# Patient Record
Sex: FEMALE | Race: BLACK OR AFRICAN AMERICAN | NOT HISPANIC OR LATINO | URBAN - METROPOLITAN AREA
[De-identification: names, ages, dates, MRNs, and addresses within clinical notes are randomized per-mention and may not be internally consistent; named-entity substitution may affect disease eponyms.]

---

## 2017-11-08 ENCOUNTER — OUTPATIENT (OUTPATIENT)
Dept: OUTPATIENT SERVICES | Facility: HOSPITAL | Age: 47
LOS: 1 days | Discharge: HOME | End: 2017-11-08

## 2017-11-08 DIAGNOSIS — Z98.89 OTHER SPECIFIED POSTPROCEDURAL STATES: Chronic | ICD-10-CM

## 2017-11-08 DIAGNOSIS — D3A.8 OTHER BENIGN NEUROENDOCRINE TUMORS: ICD-10-CM

## 2017-11-10 ENCOUNTER — APPOINTMENT (OUTPATIENT)
Dept: HEMATOLOGY ONCOLOGY | Facility: CLINIC | Age: 47
End: 2017-11-10
Payer: COMMERCIAL

## 2017-11-10 VITALS
HEART RATE: 82 BPM | TEMPERATURE: 98 F | RESPIRATION RATE: 16 BRPM | BODY MASS INDEX: 24.19 KG/M2 | WEIGHT: 132.28 LBS | SYSTOLIC BLOOD PRESSURE: 120 MMHG | DIASTOLIC BLOOD PRESSURE: 70 MMHG | OXYGEN SATURATION: 100 %

## 2017-11-10 PROCEDURE — 99214 OFFICE O/P EST MOD 30 MIN: CPT

## 2017-11-13 ENCOUNTER — INPATIENT (INPATIENT)
Facility: HOSPITAL | Age: 47
LOS: 5 days | Discharge: HOME | End: 2017-11-19
Attending: SPECIALIST | Admitting: SPECIALIST
Payer: COMMERCIAL

## 2017-11-13 ENCOUNTER — TRANSCRIPTION ENCOUNTER (OUTPATIENT)
Age: 47
End: 2017-11-13

## 2017-11-13 VITALS
HEART RATE: 85 BPM | SYSTOLIC BLOOD PRESSURE: 127 MMHG | OXYGEN SATURATION: 100 % | DIASTOLIC BLOOD PRESSURE: 79 MMHG | HEIGHT: 62 IN | WEIGHT: 130.07 LBS | RESPIRATION RATE: 18 BRPM | TEMPERATURE: 98 F

## 2017-11-13 DIAGNOSIS — Z98.89 OTHER SPECIFIED POSTPROCEDURAL STATES: Chronic | ICD-10-CM

## 2017-11-13 DIAGNOSIS — K56.690 OTHER PARTIAL INTESTINAL OBSTRUCTION: ICD-10-CM

## 2017-11-13 LAB
ALBUMIN SERPL ELPH-MCNC: 4.7 G/DL — SIGNIFICANT CHANGE UP (ref 3.3–5)
ALP SERPL-CCNC: 81 U/L — SIGNIFICANT CHANGE UP (ref 40–120)
ALT FLD-CCNC: 20 U/L — SIGNIFICANT CHANGE UP (ref 4–33)
APTT BLD: 32 SEC — SIGNIFICANT CHANGE UP (ref 27.5–37.4)
AST SERPL-CCNC: 26 U/L — SIGNIFICANT CHANGE UP (ref 4–32)
BASE EXCESS BLDV CALC-SCNC: -1.5 MMOL/L — SIGNIFICANT CHANGE UP
BASE EXCESS BLDV CALC-SCNC: 5.2 MMOL/L — SIGNIFICANT CHANGE UP
BASOPHILS # BLD AUTO: 0.03 K/UL — SIGNIFICANT CHANGE UP (ref 0–0.2)
BASOPHILS NFR BLD AUTO: 0.2 % — SIGNIFICANT CHANGE UP (ref 0–2)
BILIRUB SERPL-MCNC: 0.6 MG/DL — SIGNIFICANT CHANGE UP (ref 0.2–1.2)
BLD GP AB SCN SERPL QL: NEGATIVE — SIGNIFICANT CHANGE UP
BLD GP AB SCN SERPL QL: NEGATIVE — SIGNIFICANT CHANGE UP
BLOOD GAS VENOUS - CREATININE: 0.76 MG/DL — SIGNIFICANT CHANGE UP (ref 0.5–1.3)
BLOOD GAS VENOUS - CREATININE: SIGNIFICANT CHANGE UP MG/DL (ref 0.5–1.3)
BUN SERPL-MCNC: 12 MG/DL — SIGNIFICANT CHANGE UP (ref 7–23)
BUN SERPL-MCNC: 7 MG/DL — SIGNIFICANT CHANGE UP (ref 7–23)
CALCIUM SERPL-MCNC: 7.4 MG/DL — LOW (ref 8.4–10.5)
CALCIUM SERPL-MCNC: 9.5 MG/DL — SIGNIFICANT CHANGE UP (ref 8.4–10.5)
CHLORIDE BLDV-SCNC: 100 MMOL/L — SIGNIFICANT CHANGE UP (ref 96–108)
CHLORIDE BLDV-SCNC: 110 MMOL/L — HIGH (ref 96–108)
CHLORIDE SERPL-SCNC: 106 MMOL/L — SIGNIFICANT CHANGE UP (ref 98–107)
CHLORIDE SERPL-SCNC: 95 MMOL/L — LOW (ref 98–107)
CO2 SERPL-SCNC: 21 MMOL/L — LOW (ref 22–31)
CO2 SERPL-SCNC: 25 MMOL/L — SIGNIFICANT CHANGE UP (ref 22–31)
CREAT SERPL-MCNC: 0.65 MG/DL — SIGNIFICANT CHANGE UP (ref 0.5–1.3)
CREAT SERPL-MCNC: 0.92 MG/DL — SIGNIFICANT CHANGE UP (ref 0.5–1.3)
EOSINOPHIL # BLD AUTO: 0.02 K/UL — SIGNIFICANT CHANGE UP (ref 0–0.5)
EOSINOPHIL NFR BLD AUTO: 0.1 % — SIGNIFICANT CHANGE UP (ref 0–6)
GAS PNL BLDV: 133 MMOL/L — LOW (ref 136–146)
GAS PNL BLDV: 136 MMOL/L — SIGNIFICANT CHANGE UP (ref 136–146)
GLUCOSE BLDV-MCNC: 115 — HIGH (ref 70–99)
GLUCOSE BLDV-MCNC: 99 — SIGNIFICANT CHANGE UP (ref 70–99)
GLUCOSE SERPL-MCNC: 115 MG/DL — HIGH (ref 70–99)
GLUCOSE SERPL-MCNC: 85 MG/DL — SIGNIFICANT CHANGE UP (ref 70–99)
HCG UR-SCNC: NEGATIVE — SIGNIFICANT CHANGE UP
HCO3 BLDV-SCNC: 23 MMOL/L — SIGNIFICANT CHANGE UP (ref 20–27)
HCO3 BLDV-SCNC: 28 MMOL/L — HIGH (ref 20–27)
HCT VFR BLD CALC: 39.3 % — SIGNIFICANT CHANGE UP (ref 34.5–45)
HCT VFR BLD CALC: 42 % — SIGNIFICANT CHANGE UP (ref 34.5–45)
HCT VFR BLDV CALC: 42 % — SIGNIFICANT CHANGE UP (ref 34.5–45)
HCT VFR BLDV CALC: 45.2 % — HIGH (ref 34.5–45)
HGB BLD-MCNC: 13.3 G/DL — SIGNIFICANT CHANGE UP (ref 11.5–15.5)
HGB BLD-MCNC: 14.8 G/DL — SIGNIFICANT CHANGE UP (ref 11.5–15.5)
HGB BLDV-MCNC: 13.7 G/DL — SIGNIFICANT CHANGE UP (ref 11.5–15.5)
HGB BLDV-MCNC: 14.7 G/DL — SIGNIFICANT CHANGE UP (ref 11.5–15.5)
IMM GRANULOCYTES # BLD AUTO: 0.08 # — SIGNIFICANT CHANGE UP
IMM GRANULOCYTES NFR BLD AUTO: 0.5 % — SIGNIFICANT CHANGE UP (ref 0–1.5)
INR BLD: 1.03 — SIGNIFICANT CHANGE UP (ref 0.88–1.17)
LACTATE BLDV-MCNC: 2 MMOL/L — SIGNIFICANT CHANGE UP (ref 0.5–2)
LACTATE BLDV-MCNC: 3.6 MMOL/L — HIGH (ref 0.5–2)
LIDOCAIN IGE QN: 25.1 U/L — SIGNIFICANT CHANGE UP (ref 7–60)
LYMPHOCYTES # BLD AUTO: 1.77 K/UL — SIGNIFICANT CHANGE UP (ref 1–3.3)
LYMPHOCYTES # BLD AUTO: 10.2 % — LOW (ref 13–44)
MAGNESIUM SERPL-MCNC: 1.7 MG/DL — SIGNIFICANT CHANGE UP (ref 1.6–2.6)
MAGNESIUM SERPL-MCNC: 2.1 MG/DL — SIGNIFICANT CHANGE UP (ref 1.6–2.6)
MCHC RBC-ENTMCNC: 24.3 PG — LOW (ref 27–34)
MCHC RBC-ENTMCNC: 24.3 PG — LOW (ref 27–34)
MCHC RBC-ENTMCNC: 33.8 % — SIGNIFICANT CHANGE UP (ref 32–36)
MCHC RBC-ENTMCNC: 35.2 % — SIGNIFICANT CHANGE UP (ref 32–36)
MCV RBC AUTO: 69.1 FL — LOW (ref 80–100)
MCV RBC AUTO: 71.7 FL — LOW (ref 80–100)
MONOCYTES # BLD AUTO: 0.81 K/UL — SIGNIFICANT CHANGE UP (ref 0–0.9)
MONOCYTES NFR BLD AUTO: 4.7 % — SIGNIFICANT CHANGE UP (ref 2–14)
NEUTROPHILS # BLD AUTO: 14.63 K/UL — HIGH (ref 1.8–7.4)
NEUTROPHILS NFR BLD AUTO: 84.3 % — HIGH (ref 43–77)
NRBC # FLD: 0 — SIGNIFICANT CHANGE UP
NRBC # FLD: 0 — SIGNIFICANT CHANGE UP
PCO2 BLDV: 36 MMHG — LOW (ref 41–51)
PCO2 BLDV: 42 MMHG — SIGNIFICANT CHANGE UP (ref 41–51)
PH BLDV: 7.41 PH — SIGNIFICANT CHANGE UP (ref 7.32–7.43)
PH BLDV: 7.46 PH — HIGH (ref 7.32–7.43)
PHOSPHATE SERPL-MCNC: 3.1 MG/DL — SIGNIFICANT CHANGE UP (ref 2.5–4.5)
PLATELET # BLD AUTO: 339 K/UL — SIGNIFICANT CHANGE UP (ref 150–400)
PLATELET # BLD AUTO: 451 K/UL — HIGH (ref 150–400)
PMV BLD: 10.4 FL — SIGNIFICANT CHANGE UP (ref 7–13)
PMV BLD: 10.9 FL — SIGNIFICANT CHANGE UP (ref 7–13)
PO2 BLDV: 28 MMHG — LOW (ref 35–40)
PO2 BLDV: 30 MMHG — LOW (ref 35–40)
POTASSIUM BLDV-SCNC: 3.7 MMOL/L — SIGNIFICANT CHANGE UP (ref 3.4–4.5)
POTASSIUM BLDV-SCNC: 4 MMOL/L — SIGNIFICANT CHANGE UP (ref 3.4–4.5)
POTASSIUM SERPL-MCNC: 4.8 MMOL/L — SIGNIFICANT CHANGE UP (ref 3.5–5.3)
POTASSIUM SERPL-MCNC: 4.8 MMOL/L — SIGNIFICANT CHANGE UP (ref 3.5–5.3)
POTASSIUM SERPL-SCNC: 4.8 MMOL/L — SIGNIFICANT CHANGE UP (ref 3.5–5.3)
POTASSIUM SERPL-SCNC: 4.8 MMOL/L — SIGNIFICANT CHANGE UP (ref 3.5–5.3)
PROT SERPL-MCNC: 8.6 G/DL — HIGH (ref 6–8.3)
PROTHROM AB SERPL-ACNC: 11.5 SEC — SIGNIFICANT CHANGE UP (ref 9.8–13.1)
RBC # BLD: 5.48 M/UL — HIGH (ref 3.8–5.2)
RBC # BLD: 6.08 M/UL — HIGH (ref 3.8–5.2)
RBC # FLD: 19.3 % — HIGH (ref 10.3–14.5)
RBC # FLD: 19.8 % — HIGH (ref 10.3–14.5)
RH IG SCN BLD-IMP: POSITIVE — SIGNIFICANT CHANGE UP
RH IG SCN BLD-IMP: POSITIVE — SIGNIFICANT CHANGE UP
SAO2 % BLDV: 49.3 % — LOW (ref 60–85)
SAO2 % BLDV: 57.6 % — LOW (ref 60–85)
SODIUM SERPL-SCNC: 136 MMOL/L — SIGNIFICANT CHANGE UP (ref 135–145)
SODIUM SERPL-SCNC: 139 MMOL/L — SIGNIFICANT CHANGE UP (ref 135–145)
SP GR UR: 1.03 — SIGNIFICANT CHANGE UP (ref 1–1.03)
WBC # BLD: 16.35 K/UL — HIGH (ref 3.8–10.5)
WBC # BLD: 17.34 K/UL — HIGH (ref 3.8–10.5)
WBC # FLD AUTO: 16.35 K/UL — HIGH (ref 3.8–10.5)
WBC # FLD AUTO: 17.34 K/UL — HIGH (ref 3.8–10.5)

## 2017-11-13 PROCEDURE — 74177 CT ABD & PELVIS W/CONTRAST: CPT | Mod: 26

## 2017-11-13 PROCEDURE — 71010: CPT | Mod: 26

## 2017-11-13 RX ORDER — SODIUM CHLORIDE 9 MG/ML
1000 INJECTION INTRAMUSCULAR; INTRAVENOUS; SUBCUTANEOUS ONCE
Qty: 0 | Refills: 0 | Status: COMPLETED | OUTPATIENT
Start: 2017-11-13 | End: 2017-11-13

## 2017-11-13 RX ORDER — HYDROMORPHONE HYDROCHLORIDE 2 MG/ML
1 INJECTION INTRAMUSCULAR; INTRAVENOUS; SUBCUTANEOUS EVERY 6 HOURS
Qty: 0 | Refills: 0 | Status: DISCONTINUED | OUTPATIENT
Start: 2017-11-13 | End: 2017-11-13

## 2017-11-13 RX ORDER — INFLUENZA VIRUS VACCINE 15; 15; 15; 15 UG/.5ML; UG/.5ML; UG/.5ML; UG/.5ML
0.5 SUSPENSION INTRAMUSCULAR ONCE
Qty: 0 | Refills: 0 | Status: COMPLETED | OUTPATIENT
Start: 2017-11-13 | End: 2017-11-13

## 2017-11-13 RX ORDER — SODIUM CHLORIDE 9 MG/ML
1500 INJECTION INTRAMUSCULAR; INTRAVENOUS; SUBCUTANEOUS ONCE
Qty: 0 | Refills: 0 | Status: COMPLETED | OUTPATIENT
Start: 2017-11-13 | End: 2017-11-13

## 2017-11-13 RX ORDER — BENZOCAINE AND MENTHOL 5; 1 G/100ML; G/100ML
2 LIQUID ORAL
Qty: 0 | Refills: 0 | Status: DISCONTINUED | OUTPATIENT
Start: 2017-11-13 | End: 2017-11-19

## 2017-11-13 RX ORDER — HYDROMORPHONE HYDROCHLORIDE 2 MG/ML
2 INJECTION INTRAMUSCULAR; INTRAVENOUS; SUBCUTANEOUS EVERY 6 HOURS
Qty: 0 | Refills: 0 | Status: DISCONTINUED | OUTPATIENT
Start: 2017-11-13 | End: 2017-11-13

## 2017-11-13 RX ORDER — ACETAMINOPHEN 500 MG
1000 TABLET ORAL ONCE
Qty: 0 | Refills: 0 | Status: COMPLETED | OUTPATIENT
Start: 2017-11-13 | End: 2017-11-13

## 2017-11-13 RX ORDER — MORPHINE SULFATE 50 MG/1
4 CAPSULE, EXTENDED RELEASE ORAL ONCE
Qty: 0 | Refills: 0 | Status: DISCONTINUED | OUTPATIENT
Start: 2017-11-13 | End: 2017-11-13

## 2017-11-13 RX ORDER — ENOXAPARIN SODIUM 100 MG/ML
40 INJECTION SUBCUTANEOUS EVERY 24 HOURS
Qty: 0 | Refills: 0 | Status: DISCONTINUED | OUTPATIENT
Start: 2017-11-13 | End: 2017-11-15

## 2017-11-13 RX ORDER — SODIUM CHLORIDE 9 MG/ML
1000 INJECTION, SOLUTION INTRAVENOUS
Qty: 0 | Refills: 0 | Status: DISCONTINUED | OUTPATIENT
Start: 2017-11-13 | End: 2017-11-15

## 2017-11-13 RX ORDER — ONDANSETRON 8 MG/1
4 TABLET, FILM COATED ORAL ONCE
Qty: 0 | Refills: 0 | Status: COMPLETED | OUTPATIENT
Start: 2017-11-13 | End: 2017-11-13

## 2017-11-13 RX ORDER — MAGNESIUM SULFATE 500 MG/ML
2 VIAL (ML) INJECTION ONCE
Qty: 0 | Refills: 0 | Status: COMPLETED | OUTPATIENT
Start: 2017-11-13 | End: 2017-11-13

## 2017-11-13 RX ADMIN — SODIUM CHLORIDE 1500 MILLILITER(S): 9 INJECTION INTRAMUSCULAR; INTRAVENOUS; SUBCUTANEOUS at 05:28

## 2017-11-13 RX ADMIN — MORPHINE SULFATE 4 MILLIGRAM(S): 50 CAPSULE, EXTENDED RELEASE ORAL at 03:37

## 2017-11-13 RX ADMIN — BENZOCAINE AND MENTHOL 2 LOZENGE: 5; 1 LIQUID ORAL at 16:25

## 2017-11-13 RX ADMIN — MORPHINE SULFATE 4 MILLIGRAM(S): 50 CAPSULE, EXTENDED RELEASE ORAL at 04:05

## 2017-11-13 RX ADMIN — Medication 1000 MILLIGRAM(S): at 04:05

## 2017-11-13 RX ADMIN — HYDROMORPHONE HYDROCHLORIDE 1 MILLIGRAM(S): 2 INJECTION INTRAMUSCULAR; INTRAVENOUS; SUBCUTANEOUS at 16:39

## 2017-11-13 RX ADMIN — SODIUM CHLORIDE 100 MILLILITER(S): 9 INJECTION, SOLUTION INTRAVENOUS at 07:30

## 2017-11-13 RX ADMIN — SODIUM CHLORIDE 1000 MILLILITER(S): 9 INJECTION INTRAMUSCULAR; INTRAVENOUS; SUBCUTANEOUS at 03:37

## 2017-11-13 RX ADMIN — Medication 400 MILLIGRAM(S): at 03:37

## 2017-11-13 RX ADMIN — ONDANSETRON 4 MILLIGRAM(S): 8 TABLET, FILM COATED ORAL at 03:37

## 2017-11-13 RX ADMIN — HYDROMORPHONE HYDROCHLORIDE 1 MILLIGRAM(S): 2 INJECTION INTRAMUSCULAR; INTRAVENOUS; SUBCUTANEOUS at 11:32

## 2017-11-13 RX ADMIN — HYDROMORPHONE HYDROCHLORIDE 1 MILLIGRAM(S): 2 INJECTION INTRAMUSCULAR; INTRAVENOUS; SUBCUTANEOUS at 12:00

## 2017-11-13 RX ADMIN — ENOXAPARIN SODIUM 40 MILLIGRAM(S): 100 INJECTION SUBCUTANEOUS at 07:31

## 2017-11-13 RX ADMIN — Medication 50 GRAM(S): at 16:25

## 2017-11-13 NOTE — ED ADULT NURSE REASSESSMENT NOTE - NS ED NURSE REASSESS COMMENT FT1
Admission MD at bedside, pt unable to tolerate NGT placement. Pt currently a&ox4, respirations equal and unlabored, denies pain.

## 2017-11-13 NOTE — ED PROVIDER NOTE - OBJECTIVE STATEMENT
46 y/o F with PMH neoplasm of digestive system s/p resection , anemia p/w abdominal pain and vomiting. Pt. states she ate dinner then had abdominal pain epigastric and RUQ then vomiting, along with belching. She reports vomiting 2 x NBNB. She states she has not passed gas since the beginning of her symtpoms or had a BM. She states pain is RUQ 5/10 worse with palpation. denies fever, chills, cehst pain, SOB, fever, chills.

## 2017-11-13 NOTE — ED PROVIDER NOTE - MEDICAL DECISION MAKING DETAILS
46 y/o F with PMH neoplasm of digestive system s/p resection , anemia p/w abdominal pain and vomiting. r/o obstruction,  ct abdomen pelvis, pt states she had reaction to contrast in past but has since had ct scans with contrast without issue. Cbc, cmp, vbg, lipase, ptt/inr

## 2017-11-13 NOTE — H&P ADULT - ASSESSMENT
46 yo F with prior history of carcinoid presenting with small bowel resection    - NG tube placement  - IV fluids for resuscitation while NPO  - plan for CT scan with PO contrast to better characterize bowel obstruction  - will need further assessment of liver lesions    Discussed with Dr. Luu and Dr. Eaton, seen and examined with Dr. Pizarro (PGY-3)  --[facility], [id number]

## 2017-11-13 NOTE — ED PROVIDER NOTE - ATTENDING CONTRIBUTION TO CARE
48 yo with history of abd surgery with pain vomiting and no BM or flatus.  Abd is TTP but not peritoneal.  Will need to obtain CT to eval for SBO.  Will dispo based on the results of the CT scan

## 2017-11-13 NOTE — H&P ADULT - NSHPLABSRESULTS_GEN_ALL_CORE
CBC (11-13 @ 03:30)                              14.8                           17.34<H>  )----------------(  451<H>     84.3<H>% Neutrophils, 10.2<L>% Lymphocytes, ANC: 14.63<H>                              42.0      BMP (11-13 @ 04:00)             136     |  95<L>   |  12    		Ca++ --      Ca 9.5                ---------------------------------( 115<H>		Mg 2.1                4.8     |  25      |  0.92  			Ph --        LFTs (11-13 @ 04:00)      TPro 8.6<H> / Alb 4.7 / TBili 0.6 / DBili -- / AST 26 / ALT 20 / AlkPhos 81    Coags (11-13 @ 03:30)  aPTT 32.0 / INR 1.03 / PT 11.5    VBG (11-13 @ 03:30)     7.46<H> / 42 / 28<L> / 28<H> / 5.2 / 49.3<L>%     Lactate: 3.6<H>      ---------------------------------------------------------------------------------------------    < from: CT Abdomen and Pelvis w/ IV Cont (11.13.17 @ 04:14) >    IMPRESSION:     Small bowel obstruction with transition immediately distal to the right   hemiabdomen small bowel-small bowel anastomosis.    Recurrence of tumor in the right upper quadrant. A right lower quadrant   solid enhancing nodule is unchanged in size from prior imaging and likely   a pedunculated fibroid.    Hepatic metastases new since prior study from November 2016.    < end of copied text >

## 2017-11-13 NOTE — H&P ADULT - HISTORY OF PRESENT ILLNESS
HPI: Patient comes in with complaints of decreased PO intake, abdominal pain, and nausea/emesis since friday night.  Patient's last BM was Saturday, and has only passed small amounts of flatus since then.  Pain is intermittent, she has chills.  Denies night sweats, weight loss, SOB, chest pain, rashes.      PSH:   2014: resection of mesenteric mass (path = carcinoid).    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      Medications: Iron supplements HPI: Patient comes in with complaints of decreased PO intake, abdominal pain, and nausea/emesis since friday night.  Patient's last BM was Saturday, and has only passed small amounts of flatus since then.  Pain is intermittent, she has chills.  Denies night sweats, weight loss, SOB, chest pain, rashes.      PSH:   2014: resection of mesenteric mass (path = carcinoid).    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      Medications: Iron supplements  Allergies: contrast, shellfish    Physical exam significant for softly distended abdomen with tenderness in LUQ  Imaging demonstrated SBO with transition just distal to anastomosis, and concern for recurrence

## 2017-11-13 NOTE — H&P ADULT - NSHPPHYSICALEXAM_GEN_ALL_CORE
General: well developed, well nourished, NAD  Neuro: alert and oriented, no focal deficits, moves all extremities spontaneously  HEENT: NCAT, EOMI, anicteric, mucosa moist  Respiratory: airway patent, respirations unlabored  CVS: regular rate and rhythm  Abdomen: softly distended, with tenderness in L epigastric and LUQ, without rebound, or guarding, no appreciable masses  Extremities: no edema, sensation and movement grossly intact  Skin: warm, dry, appropriate color

## 2017-11-13 NOTE — ED ADULT TRIAGE NOTE - CHIEF COMPLAINT QUOTE
Abd pain    left sided abd pain, nausea, vomited x2 for 2 days. pain worse after eating. hx of neuroendocrine tumor where pt is c/o pain... states had surgery in 2014 but no chemo

## 2017-11-14 DIAGNOSIS — C17.9 MALIGNANT NEOPLASM OF SMALL INTESTINE, UNSPECIFIED: ICD-10-CM

## 2017-11-14 LAB
BUN SERPL-MCNC: 7 MG/DL — SIGNIFICANT CHANGE UP (ref 7–23)
CALCIUM SERPL-MCNC: 7.8 MG/DL — LOW (ref 8.4–10.5)
CHLORIDE SERPL-SCNC: 100 MMOL/L — SIGNIFICANT CHANGE UP (ref 98–107)
CO2 SERPL-SCNC: 24 MMOL/L — SIGNIFICANT CHANGE UP (ref 22–31)
CREAT SERPL-MCNC: 0.55 MG/DL — SIGNIFICANT CHANGE UP (ref 0.5–1.3)
GLUCOSE SERPL-MCNC: 134 MG/DL — HIGH (ref 70–99)
HCT VFR BLD CALC: 34.7 % — SIGNIFICANT CHANGE UP (ref 34.5–45)
HGB BLD-MCNC: 12.2 G/DL — SIGNIFICANT CHANGE UP (ref 11.5–15.5)
MAGNESIUM SERPL-MCNC: 1.8 MG/DL — SIGNIFICANT CHANGE UP (ref 1.6–2.6)
MCHC RBC-ENTMCNC: 24.7 PG — LOW (ref 27–34)
MCHC RBC-ENTMCNC: 35.2 % — SIGNIFICANT CHANGE UP (ref 32–36)
MCV RBC AUTO: 70.2 FL — LOW (ref 80–100)
NRBC # FLD: 0 — SIGNIFICANT CHANGE UP
PHOSPHATE SERPL-MCNC: 3.1 MG/DL — SIGNIFICANT CHANGE UP (ref 2.5–4.5)
PLATELET # BLD AUTO: 386 K/UL — SIGNIFICANT CHANGE UP (ref 150–400)
PMV BLD: 10.5 FL — SIGNIFICANT CHANGE UP (ref 7–13)
POTASSIUM SERPL-MCNC: 3.7 MMOL/L — SIGNIFICANT CHANGE UP (ref 3.5–5.3)
POTASSIUM SERPL-SCNC: 3.7 MMOL/L — SIGNIFICANT CHANGE UP (ref 3.5–5.3)
RBC # BLD: 4.94 M/UL — SIGNIFICANT CHANGE UP (ref 3.8–5.2)
RBC # FLD: 18.8 % — HIGH (ref 10.3–14.5)
SODIUM SERPL-SCNC: 137 MMOL/L — SIGNIFICANT CHANGE UP (ref 135–145)
WBC # BLD: 15.24 K/UL — HIGH (ref 3.8–10.5)
WBC # FLD AUTO: 15.24 K/UL — HIGH (ref 3.8–10.5)

## 2017-11-14 PROCEDURE — 99223 1ST HOSP IP/OBS HIGH 75: CPT | Mod: GC

## 2017-11-14 RX ORDER — HYDROMORPHONE HYDROCHLORIDE 2 MG/ML
1 INJECTION INTRAMUSCULAR; INTRAVENOUS; SUBCUTANEOUS
Qty: 0 | Refills: 0 | Status: DISCONTINUED | OUTPATIENT
Start: 2017-11-14 | End: 2017-11-14

## 2017-11-14 RX ORDER — NALOXONE HYDROCHLORIDE 4 MG/.1ML
0.1 SPRAY NASAL
Qty: 0 | Refills: 0 | Status: DISCONTINUED | OUTPATIENT
Start: 2017-11-14 | End: 2017-11-19

## 2017-11-14 RX ORDER — ONDANSETRON 8 MG/1
4 TABLET, FILM COATED ORAL EVERY 6 HOURS
Qty: 0 | Refills: 0 | Status: DISCONTINUED | OUTPATIENT
Start: 2017-11-14 | End: 2017-11-19

## 2017-11-14 RX ORDER — DEXAMETHASONE 0.5 MG/5ML
4 ELIXIR ORAL EVERY 6 HOURS
Qty: 0 | Refills: 0 | Status: DISCONTINUED | OUTPATIENT
Start: 2017-11-14 | End: 2017-11-14

## 2017-11-14 RX ORDER — HYDROMORPHONE HYDROCHLORIDE 2 MG/ML
0.5 INJECTION INTRAMUSCULAR; INTRAVENOUS; SUBCUTANEOUS
Qty: 0 | Refills: 0 | Status: DISCONTINUED | OUTPATIENT
Start: 2017-11-14 | End: 2017-11-14

## 2017-11-14 RX ORDER — POTASSIUM CHLORIDE 20 MEQ
10 PACKET (EA) ORAL
Qty: 0 | Refills: 0 | Status: COMPLETED | OUTPATIENT
Start: 2017-11-14 | End: 2017-11-14

## 2017-11-14 RX ORDER — HYDROMORPHONE HYDROCHLORIDE 2 MG/ML
30 INJECTION INTRAMUSCULAR; INTRAVENOUS; SUBCUTANEOUS
Qty: 0 | Refills: 0 | Status: DISCONTINUED | OUTPATIENT
Start: 2017-11-14 | End: 2017-11-16

## 2017-11-14 RX ORDER — HYDROMORPHONE HYDROCHLORIDE 2 MG/ML
0.5 INJECTION INTRAMUSCULAR; INTRAVENOUS; SUBCUTANEOUS
Qty: 0 | Refills: 0 | Status: DISCONTINUED | OUTPATIENT
Start: 2017-11-14 | End: 2017-11-16

## 2017-11-14 RX ORDER — ONDANSETRON 8 MG/1
4 TABLET, FILM COATED ORAL ONCE
Qty: 0 | Refills: 0 | Status: DISCONTINUED | OUTPATIENT
Start: 2017-11-14 | End: 2017-11-14

## 2017-11-14 RX ORDER — DIPHENHYDRAMINE HCL 50 MG
12.5 CAPSULE ORAL EVERY 4 HOURS
Qty: 0 | Refills: 0 | Status: DISCONTINUED | OUTPATIENT
Start: 2017-11-14 | End: 2017-11-16

## 2017-11-14 RX ORDER — MEPERIDINE HYDROCHLORIDE 50 MG/ML
12.5 INJECTION INTRAMUSCULAR; INTRAVENOUS; SUBCUTANEOUS
Qty: 0 | Refills: 0 | Status: DISCONTINUED | OUTPATIENT
Start: 2017-11-14 | End: 2017-11-14

## 2017-11-14 RX ADMIN — HYDROMORPHONE HYDROCHLORIDE 0.5 MILLIGRAM(S): 2 INJECTION INTRAMUSCULAR; INTRAVENOUS; SUBCUTANEOUS at 02:42

## 2017-11-14 RX ADMIN — HYDROMORPHONE HYDROCHLORIDE 0.5 MILLIGRAM(S): 2 INJECTION INTRAMUSCULAR; INTRAVENOUS; SUBCUTANEOUS at 02:50

## 2017-11-14 RX ADMIN — HYDROMORPHONE HYDROCHLORIDE 0.5 MILLIGRAM(S): 2 INJECTION INTRAMUSCULAR; INTRAVENOUS; SUBCUTANEOUS at 02:30

## 2017-11-14 RX ADMIN — HYDROMORPHONE HYDROCHLORIDE 0.5 MILLIGRAM(S): 2 INJECTION INTRAMUSCULAR; INTRAVENOUS; SUBCUTANEOUS at 02:15

## 2017-11-14 RX ADMIN — HYDROMORPHONE HYDROCHLORIDE 0.5 MILLIGRAM(S): 2 INJECTION INTRAMUSCULAR; INTRAVENOUS; SUBCUTANEOUS at 03:09

## 2017-11-14 RX ADMIN — Medication 100 MILLIEQUIVALENT(S): at 14:33

## 2017-11-14 RX ADMIN — HYDROMORPHONE HYDROCHLORIDE 0.5 MILLIGRAM(S): 2 INJECTION INTRAMUSCULAR; INTRAVENOUS; SUBCUTANEOUS at 02:41

## 2017-11-14 RX ADMIN — Medication 100 MILLIEQUIVALENT(S): at 17:19

## 2017-11-14 RX ADMIN — Medication 100 MILLIEQUIVALENT(S): at 15:46

## 2017-11-14 RX ADMIN — HYDROMORPHONE HYDROCHLORIDE 0.5 MILLIGRAM(S): 2 INJECTION INTRAMUSCULAR; INTRAVENOUS; SUBCUTANEOUS at 02:05

## 2017-11-14 RX ADMIN — SODIUM CHLORIDE 100 MILLILITER(S): 9 INJECTION, SOLUTION INTRAVENOUS at 15:47

## 2017-11-14 RX ADMIN — SODIUM CHLORIDE 100 MILLILITER(S): 9 INJECTION, SOLUTION INTRAVENOUS at 02:00

## 2017-11-14 RX ADMIN — HYDROMORPHONE HYDROCHLORIDE 30 MILLILITER(S): 2 INJECTION INTRAMUSCULAR; INTRAVENOUS; SUBCUTANEOUS at 04:15

## 2017-11-14 RX ADMIN — HYDROMORPHONE HYDROCHLORIDE 30 MILLILITER(S): 2 INJECTION INTRAMUSCULAR; INTRAVENOUS; SUBCUTANEOUS at 20:16

## 2017-11-14 RX ADMIN — ENOXAPARIN SODIUM 40 MILLIGRAM(S): 100 INJECTION SUBCUTANEOUS at 10:50

## 2017-11-14 RX ADMIN — HYDROMORPHONE HYDROCHLORIDE 0.5 MILLIGRAM(S): 2 INJECTION INTRAMUSCULAR; INTRAVENOUS; SUBCUTANEOUS at 03:00

## 2017-11-14 NOTE — CONSULT NOTE ADULT - SUBJECTIVE AND OBJECTIVE BOX
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  Patient's last BM was Saturday, and has only passed small amounts of flatus since then.  Pain is intermittent, she has chills.  Denies night sweats, weight loss, SOB, chest pain, rashes.  CT A/P showing SBO and reoccurrence of tumor as well as new liver metastases.     PSH:   2014: resection of mesenteric mass (path = carcinoid).    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      Medications: Iron supplements  Allergies: contrast, shellfish        Allergies    IV Contrast (Unknown)    Intolerances        MEDICATIONS  (STANDING):  enoxaparin Injectable 40 milliGRAM(s) SubCutaneous every 24 hours  HYDROmorphone PCA (1 mG/mL) 30 milliLiter(s) PCA Continuous PCA Continuous  influenza   Vaccine 0.5 milliLiter(s) IntraMuscular once  lactated ringers. 1000 milliLiter(s) (100 mL/Hr) IV Continuous <Continuous>  potassium chloride  10 mEq/100 mL IVPB 10 milliEquivalent(s) IV Intermittent every 1 hour    MEDICATIONS  (PRN):  benzocaine  15 mG/menthol 3.6 mG Oral Lozenge - Peds 2 Lozenge Oral every 3 hours PRN THRoat irritation  dexamethasone  Injectable 4 milliGRAM(s) IV Push every 6 hours PRN Nausea, IF ondansetron is ineffective after 30 - 60 minute  diphenhydrAMINE   Injectable 12.5 milliGRAM(s) IV Push every 4 hours PRN Pruritus  HYDROmorphone  Injectable 1 milliGRAM(s) IV Push every 10 minutes PRN Severe Pain (7 - 10)  HYDROmorphone PCA (1 mG/mL) Rescue Clinician Bolus 0.5 milliGRAM(s) IV Push every 15 minutes PRN for Pain Scale GREATER THAN 6  naloxone Injectable 0.1 milliGRAM(s) IV Push every 3 minutes PRN For ANY of the following changes in patient status:  A. RR LESS THAN 10 breaths per minute, B. Oxygen saturation LESS THAN 90%, C. Sedation score of 6  ondansetron Injectable 4 milliGRAM(s) IV Push every 6 hours PRN Nausea      PAST MEDICAL & SURGICAL HISTORY:  Anemia  Malignant neoplasm of small intestine  Neoplasm of digestive system  Vertigo  S/P laparoscopy: S/P Excsion left retroperitoneal mass 02/3/2014      FAMILY HISTORY:  No pertinent family history in first degree relatives      SOCIAL HISTORY: No EtOH, no tobacco    REVIEW OF SYSTEMS:    CONSTITUTIONAL: No weakness, fevers or chills  EYES/ENT: No visual changes;  No vertigo or throat pain   NECK: No pain or stiffness  RESPIRATORY: No cough, wheezing, hemoptysis; No shortness of breath  CARDIOVASCULAR: No chest pain or palpitations  GASTROINTESTINAL: No abdominal or epigastric pain. No nausea, vomiting, or hematemesis; No diarrhea or constipation. No melena or hematochezia.  GENITOURINARY: No dysuria, frequency or hematuria  NEUROLOGICAL: No numbness or weakness  SKIN: No itching, burning, rashes, or lesions   All other review of systems is negative unless indicated above.        T(F): 98.2 (11-14-17 @ 12:00), Max: 99 (11-13-17 @ 23:16)  HR: 85 (11-14-17 @ 12:00)  BP: 117/76 (11-14-17 @ 12:00)  RR: 16 (11-14-17 @ 12:00)  SpO2: 95% (11-14-17 @ 12:00)  Wt(kg): --    GENERAL: NAD, well-developed  HEAD:  Atraumatic, Normocephalic  EYES: EOMI, PERRLA, conjunctiva and sclera clear  NECK: Supple, No JVD  CHEST/LUNG: Clear to auscultation bilaterally; No wheeze  HEART: Regular rate and rhythm; No murmurs, rubs, or gallops  ABDOMEN: Soft, Nontender, Nondistended; Bowel sounds present  EXTREMITIES:  2+ Peripheral Pulses, No clubbing, cyanosis, or edema  NEUROLOGY: non-focal  SKIN: No rashes or lesions                          12.2   15.24 )-----------( 386      ( 14 Nov 2017 06:00 )             34.7       11-14    137  |  100  |  7   ----------------------------<  134<H>  3.7   |  24  |  0.55    Ca    7.8<L>      14 Nov 2017 06:00  Phos  3.1     11-14  Mg     1.8     11-14    TPro  8.6<H>  /  Alb  4.7  /  TBili  0.6  /  DBili  x   /  AST  26  /  ALT  20  /  AlkPhos  81  11-13      Magnesium, Serum: 1.8 mg/dL (11-14 @ 06:00)  Phosphorus Level, Serum: 3.1 mg/dL (11-14 @ 06:00)

## 2017-11-14 NOTE — CONSULT NOTE ADULT - PROBLEM SELECTOR RECOMMENDATION 9
CT A/P concerning for disease reoccurrence with metastatic disease to liver. Pt is now s/p laparotomy for small bowel resection.   - please arrange with IR for percutaneous biopsy of liver lesion to confirm with tissue diagnosis  - send serum chromogranin and urine 5-HIAA and serotonin

## 2017-11-14 NOTE — CONSULT NOTE ADULT - ASSESSMENT
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

## 2017-11-14 NOTE — CONSULT NOTE ADULT - ATTENDING COMMENTS
Low grade neuroendocrine tumor p/w recurrence causing SBO and new liver mets. S/p surgery for SBO. Paain controlled. Rec liver bx to prove mets when pt stable.

## 2017-11-14 NOTE — BRIEF OPERATIVE NOTE - PROCEDURE
<<-----Click on this checkbox to enter Procedure Exploratory laparotomy  11/14/2017  Ex-lap, NEELAM, repair of serosal tear  Active  CBEHR

## 2017-11-15 LAB
BUN SERPL-MCNC: 7 MG/DL — SIGNIFICANT CHANGE UP (ref 7–23)
CALCIUM SERPL-MCNC: 7.8 MG/DL — LOW (ref 8.4–10.5)
CHLORIDE SERPL-SCNC: 100 MMOL/L — SIGNIFICANT CHANGE UP (ref 98–107)
CO2 SERPL-SCNC: 25 MMOL/L — SIGNIFICANT CHANGE UP (ref 22–31)
CREAT SERPL-MCNC: 0.58 MG/DL — SIGNIFICANT CHANGE UP (ref 0.5–1.3)
GLUCOSE BLDC GLUCOMTR-MCNC: 101 MG/DL — HIGH (ref 70–99)
GLUCOSE BLDC GLUCOMTR-MCNC: 104 MG/DL — HIGH (ref 70–99)
GLUCOSE BLDC GLUCOMTR-MCNC: 140 MG/DL — HIGH (ref 70–99)
GLUCOSE SERPL-MCNC: 68 MG/DL — LOW (ref 70–99)
HCT VFR BLD CALC: 30.5 % — LOW (ref 34.5–45)
HGB BLD-MCNC: 10.6 G/DL — LOW (ref 11.5–15.5)
MAGNESIUM SERPL-MCNC: 1.8 MG/DL — SIGNIFICANT CHANGE UP (ref 1.6–2.6)
MCHC RBC-ENTMCNC: 24.5 PG — LOW (ref 27–34)
MCHC RBC-ENTMCNC: 34.8 % — SIGNIFICANT CHANGE UP (ref 32–36)
MCV RBC AUTO: 70.6 FL — LOW (ref 80–100)
NRBC # FLD: 0 — SIGNIFICANT CHANGE UP
PHOSPHATE SERPL-MCNC: 1.9 MG/DL — LOW (ref 2.5–4.5)
PLATELET # BLD AUTO: 340 K/UL — SIGNIFICANT CHANGE UP (ref 150–400)
PMV BLD: 10.7 FL — SIGNIFICANT CHANGE UP (ref 7–13)
POTASSIUM SERPL-MCNC: 3.4 MMOL/L — LOW (ref 3.5–5.3)
POTASSIUM SERPL-SCNC: 3.4 MMOL/L — LOW (ref 3.5–5.3)
RBC # BLD: 4.32 M/UL — SIGNIFICANT CHANGE UP (ref 3.8–5.2)
RBC # FLD: 18.2 % — HIGH (ref 10.3–14.5)
SODIUM SERPL-SCNC: 141 MMOL/L — SIGNIFICANT CHANGE UP (ref 135–145)
WBC # BLD: 11.25 K/UL — HIGH (ref 3.8–10.5)
WBC # FLD AUTO: 11.25 K/UL — HIGH (ref 3.8–10.5)

## 2017-11-15 RX ORDER — POTASSIUM PHOSPHATE, MONOBASIC POTASSIUM PHOSPHATE, DIBASIC 236; 224 MG/ML; MG/ML
15 INJECTION, SOLUTION INTRAVENOUS ONCE
Qty: 0 | Refills: 0 | Status: COMPLETED | OUTPATIENT
Start: 2017-11-15 | End: 2017-11-15

## 2017-11-15 RX ORDER — DEXTROSE MONOHYDRATE, SODIUM CHLORIDE, AND POTASSIUM CHLORIDE 50; .745; 4.5 G/1000ML; G/1000ML; G/1000ML
1000 INJECTION, SOLUTION INTRAVENOUS
Qty: 0 | Refills: 0 | Status: DISCONTINUED | OUTPATIENT
Start: 2017-11-15 | End: 2017-11-18

## 2017-11-15 RX ORDER — ACETAMINOPHEN 500 MG
1000 TABLET ORAL ONCE
Qty: 0 | Refills: 0 | Status: COMPLETED | OUTPATIENT
Start: 2017-11-15 | End: 2017-11-15

## 2017-11-15 RX ORDER — ENOXAPARIN SODIUM 100 MG/ML
40 INJECTION SUBCUTANEOUS EVERY 24 HOURS
Qty: 0 | Refills: 0 | Status: DISCONTINUED | OUTPATIENT
Start: 2017-11-17 | End: 2017-11-19

## 2017-11-15 RX ORDER — MAGNESIUM SULFATE 500 MG/ML
2 VIAL (ML) INJECTION ONCE
Qty: 0 | Refills: 0 | Status: COMPLETED | OUTPATIENT
Start: 2017-11-15 | End: 2017-11-15

## 2017-11-15 RX ORDER — POTASSIUM CHLORIDE 20 MEQ
10 PACKET (EA) ORAL
Qty: 0 | Refills: 0 | Status: COMPLETED | OUTPATIENT
Start: 2017-11-15 | End: 2017-11-15

## 2017-11-15 RX ADMIN — Medication 50 GRAM(S): at 10:52

## 2017-11-15 RX ADMIN — Medication 100 MILLIEQUIVALENT(S): at 11:43

## 2017-11-15 RX ADMIN — HYDROMORPHONE HYDROCHLORIDE 30 MILLILITER(S): 2 INJECTION INTRAMUSCULAR; INTRAVENOUS; SUBCUTANEOUS at 20:04

## 2017-11-15 RX ADMIN — HYDROMORPHONE HYDROCHLORIDE 30 MILLILITER(S): 2 INJECTION INTRAMUSCULAR; INTRAVENOUS; SUBCUTANEOUS at 15:12

## 2017-11-15 RX ADMIN — ENOXAPARIN SODIUM 40 MILLIGRAM(S): 100 INJECTION SUBCUTANEOUS at 09:57

## 2017-11-15 RX ADMIN — DEXTROSE MONOHYDRATE, SODIUM CHLORIDE, AND POTASSIUM CHLORIDE 100 MILLILITER(S): 50; .745; 4.5 INJECTION, SOLUTION INTRAVENOUS at 09:53

## 2017-11-15 RX ADMIN — Medication 100 MILLIEQUIVALENT(S): at 13:19

## 2017-11-15 RX ADMIN — HYDROMORPHONE HYDROCHLORIDE 30 MILLILITER(S): 2 INJECTION INTRAMUSCULAR; INTRAVENOUS; SUBCUTANEOUS at 08:31

## 2017-11-15 RX ADMIN — Medication 400 MILLIGRAM(S): at 18:31

## 2017-11-15 RX ADMIN — POTASSIUM PHOSPHATE, MONOBASIC POTASSIUM PHOSPHATE, DIBASIC 62.5 MILLIMOLE(S): 236; 224 INJECTION, SOLUTION INTRAVENOUS at 16:17

## 2017-11-15 RX ADMIN — Medication 100 MILLIEQUIVALENT(S): at 09:53

## 2017-11-16 ENCOUNTER — RESULT REVIEW (OUTPATIENT)
Age: 47
End: 2017-11-16

## 2017-11-16 LAB
APTT BLD: 26.4 SEC — LOW (ref 27.5–37.4)
BUN SERPL-MCNC: 4 MG/DL — LOW (ref 7–23)
BUN SERPL-MCNC: 7 MG/DL — SIGNIFICANT CHANGE UP (ref 7–23)
CALCIUM SERPL-MCNC: 7.8 MG/DL — LOW (ref 8.4–10.5)
CALCIUM SERPL-MCNC: 7.8 MG/DL — LOW (ref 8.4–10.5)
CHLORIDE SERPL-SCNC: 102 MMOL/L — SIGNIFICANT CHANGE UP (ref 98–107)
CHLORIDE SERPL-SCNC: 103 MMOL/L — SIGNIFICANT CHANGE UP (ref 98–107)
CO2 SERPL-SCNC: 23 MMOL/L — SIGNIFICANT CHANGE UP (ref 22–31)
CO2 SERPL-SCNC: 24 MMOL/L — SIGNIFICANT CHANGE UP (ref 22–31)
CREAT SERPL-MCNC: 0.53 MG/DL — SIGNIFICANT CHANGE UP (ref 0.5–1.3)
CREAT SERPL-MCNC: 0.56 MG/DL — SIGNIFICANT CHANGE UP (ref 0.5–1.3)
GLUCOSE BLDC GLUCOMTR-MCNC: 106 MG/DL — HIGH (ref 70–99)
GLUCOSE BLDC GLUCOMTR-MCNC: 118 MG/DL — HIGH (ref 70–99)
GLUCOSE SERPL-MCNC: 115 MG/DL — HIGH (ref 70–99)
GLUCOSE SERPL-MCNC: 132 MG/DL — HIGH (ref 70–99)
HCG SERPL-ACNC: < 5 MIU/ML — SIGNIFICANT CHANGE UP
HCG UR-SCNC: NEGATIVE — SIGNIFICANT CHANGE UP
HCT VFR BLD CALC: 31.4 % — LOW (ref 34.5–45)
HGB BLD-MCNC: 10.9 G/DL — LOW (ref 11.5–15.5)
INR BLD: 1.03 — SIGNIFICANT CHANGE UP (ref 0.88–1.17)
MAGNESIUM SERPL-MCNC: 1.9 MG/DL — SIGNIFICANT CHANGE UP (ref 1.6–2.6)
MAGNESIUM SERPL-MCNC: 2.1 MG/DL — SIGNIFICANT CHANGE UP (ref 1.6–2.6)
MCHC RBC-ENTMCNC: 24.3 PG — LOW (ref 27–34)
MCHC RBC-ENTMCNC: 34.7 % — SIGNIFICANT CHANGE UP (ref 32–36)
MCV RBC AUTO: 70.1 FL — LOW (ref 80–100)
NRBC # FLD: 0 — SIGNIFICANT CHANGE UP
PHOSPHATE SERPL-MCNC: 1.3 MG/DL — LOW (ref 2.5–4.5)
PHOSPHATE SERPL-MCNC: 3 MG/DL — SIGNIFICANT CHANGE UP (ref 2.5–4.5)
PLATELET # BLD AUTO: 166 K/UL — SIGNIFICANT CHANGE UP (ref 150–400)
PMV BLD: 10.6 FL — SIGNIFICANT CHANGE UP (ref 7–13)
POTASSIUM SERPL-MCNC: 3.5 MMOL/L — SIGNIFICANT CHANGE UP (ref 3.5–5.3)
POTASSIUM SERPL-MCNC: 3.6 MMOL/L — SIGNIFICANT CHANGE UP (ref 3.5–5.3)
POTASSIUM SERPL-SCNC: 3.5 MMOL/L — SIGNIFICANT CHANGE UP (ref 3.5–5.3)
POTASSIUM SERPL-SCNC: 3.6 MMOL/L — SIGNIFICANT CHANGE UP (ref 3.5–5.3)
PROTHROM AB SERPL-ACNC: 11.6 SEC — SIGNIFICANT CHANGE UP (ref 9.8–13.1)
RBC # BLD: 4.48 M/UL — SIGNIFICANT CHANGE UP (ref 3.8–5.2)
RBC # FLD: 18.3 % — HIGH (ref 10.3–14.5)
SODIUM SERPL-SCNC: 139 MMOL/L — SIGNIFICANT CHANGE UP (ref 135–145)
SODIUM SERPL-SCNC: 139 MMOL/L — SIGNIFICANT CHANGE UP (ref 135–145)
SP GR UR: 1.01 — SIGNIFICANT CHANGE UP (ref 1–1.03)
WBC # BLD: 12.81 K/UL — HIGH (ref 3.8–10.5)
WBC # FLD AUTO: 12.81 K/UL — HIGH (ref 3.8–10.5)

## 2017-11-16 PROCEDURE — 88360 TUMOR IMMUNOHISTOCHEM/MANUAL: CPT | Mod: 26

## 2017-11-16 PROCEDURE — 88342 IMHCHEM/IMCYTCHM 1ST ANTB: CPT | Mod: 26,59

## 2017-11-16 PROCEDURE — 88341 IMHCHEM/IMCYTCHM EA ADD ANTB: CPT | Mod: 26,59

## 2017-11-16 PROCEDURE — 47000 NEEDLE BIOPSY OF LIVER PERQ: CPT

## 2017-11-16 PROCEDURE — 76942 ECHO GUIDE FOR BIOPSY: CPT | Mod: 26

## 2017-11-16 PROCEDURE — 88307 TISSUE EXAM BY PATHOLOGIST: CPT | Mod: 26

## 2017-11-16 RX ORDER — POTASSIUM PHOSPHATE, MONOBASIC POTASSIUM PHOSPHATE, DIBASIC 236; 224 MG/ML; MG/ML
30 INJECTION, SOLUTION INTRAVENOUS ONCE
Qty: 0 | Refills: 0 | Status: DISCONTINUED | OUTPATIENT
Start: 2017-11-16 | End: 2017-11-16

## 2017-11-16 RX ORDER — HYDROMORPHONE HYDROCHLORIDE 2 MG/ML
0.5 INJECTION INTRAMUSCULAR; INTRAVENOUS; SUBCUTANEOUS
Qty: 0 | Refills: 0 | Status: DISCONTINUED | OUTPATIENT
Start: 2017-11-16 | End: 2017-11-18

## 2017-11-16 RX ORDER — POTASSIUM PHOSPHATE, MONOBASIC POTASSIUM PHOSPHATE, DIBASIC 236; 224 MG/ML; MG/ML
15 INJECTION, SOLUTION INTRAVENOUS ONCE
Qty: 0 | Refills: 0 | Status: COMPLETED | OUTPATIENT
Start: 2017-11-16 | End: 2017-11-16

## 2017-11-16 RX ADMIN — HYDROMORPHONE HYDROCHLORIDE 30 MILLILITER(S): 2 INJECTION INTRAMUSCULAR; INTRAVENOUS; SUBCUTANEOUS at 08:19

## 2017-11-16 RX ADMIN — POTASSIUM PHOSPHATE, MONOBASIC POTASSIUM PHOSPHATE, DIBASIC 62.5 MILLIMOLE(S): 236; 224 INJECTION, SOLUTION INTRAVENOUS at 10:35

## 2017-11-16 RX ADMIN — Medication 63.75 MILLIMOLE(S): at 14:05

## 2017-11-16 RX ADMIN — DEXTROSE MONOHYDRATE, SODIUM CHLORIDE, AND POTASSIUM CHLORIDE 100 MILLILITER(S): 50; .745; 4.5 INJECTION, SOLUTION INTRAVENOUS at 14:05

## 2017-11-16 RX ADMIN — DEXTROSE MONOHYDRATE, SODIUM CHLORIDE, AND POTASSIUM CHLORIDE 100 MILLILITER(S): 50; .745; 4.5 INJECTION, SOLUTION INTRAVENOUS at 10:35

## 2017-11-17 LAB
BUN SERPL-MCNC: 6 MG/DL — LOW (ref 7–23)
CALCIUM SERPL-MCNC: 8.2 MG/DL — LOW (ref 8.4–10.5)
CGA FLD-MCNC: 252 NG/ML — HIGH
CHLORIDE SERPL-SCNC: 103 MMOL/L — SIGNIFICANT CHANGE UP (ref 98–107)
CO2 SERPL-SCNC: 24 MMOL/L — SIGNIFICANT CHANGE UP (ref 22–31)
CREAT SERPL-MCNC: 0.63 MG/DL — SIGNIFICANT CHANGE UP (ref 0.5–1.3)
GLUCOSE SERPL-MCNC: 113 MG/DL — HIGH (ref 70–99)
HCT VFR BLD CALC: 32.9 % — LOW (ref 34.5–45)
HGB BLD-MCNC: 11.6 G/DL — SIGNIFICANT CHANGE UP (ref 11.5–15.5)
MAGNESIUM SERPL-MCNC: 1.9 MG/DL — SIGNIFICANT CHANGE UP (ref 1.6–2.6)
MCHC RBC-ENTMCNC: 25.1 PG — LOW (ref 27–34)
MCHC RBC-ENTMCNC: 35.3 % — SIGNIFICANT CHANGE UP (ref 32–36)
MCV RBC AUTO: 71.1 FL — LOW (ref 80–100)
NRBC # FLD: 0 — SIGNIFICANT CHANGE UP
PHOSPHATE SERPL-MCNC: 2.6 MG/DL — SIGNIFICANT CHANGE UP (ref 2.5–4.5)
PLATELET # BLD AUTO: 333 K/UL — SIGNIFICANT CHANGE UP (ref 150–400)
PMV BLD: 9.9 FL — SIGNIFICANT CHANGE UP (ref 7–13)
POTASSIUM SERPL-MCNC: 3.7 MMOL/L — SIGNIFICANT CHANGE UP (ref 3.5–5.3)
POTASSIUM SERPL-SCNC: 3.7 MMOL/L — SIGNIFICANT CHANGE UP (ref 3.5–5.3)
RBC # BLD: 4.63 M/UL — SIGNIFICANT CHANGE UP (ref 3.8–5.2)
RBC # FLD: 18.3 % — HIGH (ref 10.3–14.5)
SODIUM SERPL-SCNC: 138 MMOL/L — SIGNIFICANT CHANGE UP (ref 135–145)
WBC # BLD: 12.42 K/UL — HIGH (ref 3.8–10.5)
WBC # FLD AUTO: 12.42 K/UL — HIGH (ref 3.8–10.5)

## 2017-11-17 RX ORDER — BENZOCAINE AND MENTHOL 5; 1 G/100ML; G/100ML
1 LIQUID ORAL DAILY
Qty: 0 | Refills: 0 | Status: DISCONTINUED | OUTPATIENT
Start: 2017-11-17 | End: 2017-11-19

## 2017-11-17 RX ORDER — POTASSIUM PHOSPHATE, MONOBASIC POTASSIUM PHOSPHATE, DIBASIC 236; 224 MG/ML; MG/ML
15 INJECTION, SOLUTION INTRAVENOUS ONCE
Qty: 0 | Refills: 0 | Status: COMPLETED | OUTPATIENT
Start: 2017-11-17 | End: 2017-11-17

## 2017-11-17 RX ADMIN — POTASSIUM PHOSPHATE, MONOBASIC POTASSIUM PHOSPHATE, DIBASIC 62.5 MILLIMOLE(S): 236; 224 INJECTION, SOLUTION INTRAVENOUS at 16:02

## 2017-11-17 RX ADMIN — ENOXAPARIN SODIUM 40 MILLIGRAM(S): 100 INJECTION SUBCUTANEOUS at 16:01

## 2017-11-17 RX ADMIN — DEXTROSE MONOHYDRATE, SODIUM CHLORIDE, AND POTASSIUM CHLORIDE 50 MILLILITER(S): 50; .745; 4.5 INJECTION, SOLUTION INTRAVENOUS at 16:02

## 2017-11-18 ENCOUNTER — TRANSCRIPTION ENCOUNTER (OUTPATIENT)
Age: 47
End: 2017-11-18

## 2017-11-18 LAB
5OH-INDOLEACETATE UR-MCNC: 13 ZZ — HIGH
BUN SERPL-MCNC: 4 MG/DL — LOW (ref 7–23)
CALCIUM SERPL-MCNC: 8.6 MG/DL — SIGNIFICANT CHANGE UP (ref 8.4–10.5)
CHLORIDE SERPL-SCNC: 101 MMOL/L — SIGNIFICANT CHANGE UP (ref 98–107)
CO2 SERPL-SCNC: 25 MMOL/L — SIGNIFICANT CHANGE UP (ref 22–31)
CREAT SERPL-MCNC: 0.64 MG/DL — SIGNIFICANT CHANGE UP (ref 0.5–1.3)
GLUCOSE SERPL-MCNC: 90 MG/DL — SIGNIFICANT CHANGE UP (ref 70–99)
HCT VFR BLD CALC: 31.5 % — LOW (ref 34.5–45)
HGB BLD-MCNC: 11 G/DL — LOW (ref 11.5–15.5)
MAGNESIUM SERPL-MCNC: 1.7 MG/DL — SIGNIFICANT CHANGE UP (ref 1.6–2.6)
MCHC RBC-ENTMCNC: 25 PG — LOW (ref 27–34)
MCHC RBC-ENTMCNC: 34.9 % — SIGNIFICANT CHANGE UP (ref 32–36)
MCV RBC AUTO: 71.6 FL — LOW (ref 80–100)
NRBC # FLD: 0 — SIGNIFICANT CHANGE UP
PHOSPHATE SERPL-MCNC: 2.9 MG/DL — SIGNIFICANT CHANGE UP (ref 2.5–4.5)
PLATELET # BLD AUTO: 378 K/UL — SIGNIFICANT CHANGE UP (ref 150–400)
PMV BLD: 10.4 FL — SIGNIFICANT CHANGE UP (ref 7–13)
POTASSIUM SERPL-MCNC: 3.4 MMOL/L — LOW (ref 3.5–5.3)
POTASSIUM SERPL-SCNC: 3.4 MMOL/L — LOW (ref 3.5–5.3)
RBC # BLD: 4.4 M/UL — SIGNIFICANT CHANGE UP (ref 3.8–5.2)
RBC # FLD: 18.1 % — HIGH (ref 10.3–14.5)
SODIUM SERPL-SCNC: 139 MMOL/L — SIGNIFICANT CHANGE UP (ref 135–145)
WBC # BLD: 11.55 K/UL — HIGH (ref 3.8–10.5)
WBC # FLD AUTO: 11.55 K/UL — HIGH (ref 3.8–10.5)

## 2017-11-18 RX ORDER — ACETAMINOPHEN 500 MG
650 TABLET ORAL EVERY 6 HOURS
Qty: 0 | Refills: 0 | Status: DISCONTINUED | OUTPATIENT
Start: 2017-11-18 | End: 2017-11-19

## 2017-11-18 RX ORDER — POTASSIUM CHLORIDE 20 MEQ
40 PACKET (EA) ORAL ONCE
Qty: 0 | Refills: 0 | Status: COMPLETED | OUTPATIENT
Start: 2017-11-18 | End: 2017-11-18

## 2017-11-18 RX ORDER — SODIUM CHLORIDE 9 MG/ML
3 INJECTION INTRAMUSCULAR; INTRAVENOUS; SUBCUTANEOUS EVERY 8 HOURS
Qty: 0 | Refills: 0 | Status: DISCONTINUED | OUTPATIENT
Start: 2017-11-18 | End: 2017-11-19

## 2017-11-18 RX ADMIN — SODIUM CHLORIDE 3 MILLILITER(S): 9 INJECTION INTRAMUSCULAR; INTRAVENOUS; SUBCUTANEOUS at 13:30

## 2017-11-18 RX ADMIN — Medication 40 MILLIEQUIVALENT(S): at 17:39

## 2017-11-18 RX ADMIN — SODIUM CHLORIDE 3 MILLILITER(S): 9 INJECTION INTRAMUSCULAR; INTRAVENOUS; SUBCUTANEOUS at 22:09

## 2017-11-18 RX ADMIN — ENOXAPARIN SODIUM 40 MILLIGRAM(S): 100 INJECTION SUBCUTANEOUS at 17:39

## 2017-11-18 NOTE — DISCHARGE NOTE ADULT - PLAN OF CARE
Return to normal function. No activity restrictions. No dietary restrictions.     Follow up with Dr. Eaton in the office in 10-14 days. Call the office to make an appt.

## 2017-11-18 NOTE — DISCHARGE NOTE ADULT - CARE PROVIDER_API CALL
Johnson Eaton), Surgery  [address] [facility]  [address] [place] [id number]  Phone: [phone]  Fax: [phone]

## 2017-11-18 NOTE — DISCHARGE NOTE ADULT - HOSPITAL COURSE
47F admitted on 11/13/2017 with prior history of carcinoid presenting with small bowel resection now s/p laparotomy with lysis of adhesions. Patient underwent procedure on 11/14/2017. Patient was seen and evaluated by heme/onc post-operatively who recommended IR for percutaneous biopsy of liver lesion to confirm with tissue diagnosis. Patient subsequently underwent IR biopsy. Awaiting results. Patient had NGT placed post-op, was kept NPO with IVF, PCA for pain control and rincon in place for monitoring of output. NGT was removed on 11/16. Patient's diet has been advanced, currently tolerating regular diet. Incisions have remained clean, dry, intact. Stable for discharge with close outpatient follow up.

## 2017-11-18 NOTE — DISCHARGE NOTE ADULT - MEDICATION SUMMARY - MEDICATIONS TO TAKE
I will START or STAY ON the medications listed below when I get home from the hospital:  None I will START or STAY ON the medications listed below when I get home from the hospital:    Percocet 5/325 325 mg-5 mg oral tablet  -- 1 tab(s) by mouth every 6 hours, As Needed, PRN for abdominal pain MDD:4 tabs  -- Indication: For pain I will START or STAY ON the medications listed below when I get home from the hospital:    oxyCODONE 5 mg oral tablet  -- 1 tab(s) by mouth every 6 hours MDD:4 tabs  -- Caution federal law prohibits the transfer of this drug to any person other  than the person for whom it was prescribed.  It is very important that you take or use this exactly as directed.  Do not skip doses or discontinue unless directed by your doctor.  May cause drowsiness.  Alcohol may intensify this effect.  Use care when operating dangerous machinery.  This prescription cannot be refilled.  Using more of this medication than prescribed may cause serious breathing problems.    -- Indication: For Moderate to severe pain

## 2017-11-19 VITALS
TEMPERATURE: 98 F | DIASTOLIC BLOOD PRESSURE: 71 MMHG | OXYGEN SATURATION: 100 % | RESPIRATION RATE: 18 BRPM | SYSTOLIC BLOOD PRESSURE: 115 MMHG | HEART RATE: 67 BPM

## 2017-11-19 RX ORDER — OXYCODONE HYDROCHLORIDE 5 MG/1
1 TABLET ORAL
Qty: 20 | Refills: 0 | OUTPATIENT
Start: 2017-11-19 | End: 2017-11-24

## 2017-11-19 RX ADMIN — SODIUM CHLORIDE 3 MILLILITER(S): 9 INJECTION INTRAMUSCULAR; INTRAVENOUS; SUBCUTANEOUS at 05:45

## 2017-11-19 RX ADMIN — SODIUM CHLORIDE 3 MILLILITER(S): 9 INJECTION INTRAMUSCULAR; INTRAVENOUS; SUBCUTANEOUS at 14:46

## 2017-11-19 NOTE — PROGRESS NOTE ADULT - ASSESSMENT
48 yo F with prior history of carcinoid presenting with small bowel resection now s/p laparotomy with lysis of adhesions.  - NPO/NGT to LCS  - Pain control  - Medical oncology following, recommending IR biopsy of known liver lesion due to history of neuroendocrine tumor  - Monitor NGT outputs  - DVT prophylaxis
46 yo F with prior history of carcinoid presenting with small bowel resection now s/p laparotomy with lysis of adhesions.    Plan:  - NPO with NGT to LCS  - PCA for pain control  - Medical oncology following, recommending IR biopsy of known liver lesion due to history of neuroendocrine tumor  - Monitor outputs
48 yo F with prior history of carcinoid presenting with small bowel resection now s/p laparotomy with lysis of adhesions.    Plan:  - Regular diet   - Pain control  - DVT prophylaxis   - Discharge home today if tolerating diet
48 yo F with prior history of carcinoid presenting with small bowel resection now s/p laparotomy with lysis of adhesions.  - NPO  - Will D/C NGT  - Pain control  - To IR for liver biopsy due to history of neuroendocrine tumor  - DVT prophylaxis
48 yo F with prior history of carcinoid presenting with small bowel resection now s/p laparotomy with lysis of adhesions.  - Regular diet   - Pain control  - DVT prophylaxis   - Discharge home today if afebrile and richi diet
48 yo F with prior history of carcinoid presenting with small bowel resection now s/p laparotomy with lysis of adhesions.  - on sips  - Pain control  - D/C rincon DTV  - DVT prophylaxis

## 2017-11-19 NOTE — PROGRESS NOTE ADULT - SUBJECTIVE AND OBJECTIVE BOX
B Team Surgery Progress Note - pager [phone]    SUBJECTIVE: Patient seen and examined on B team morning rounds. No acute events overnight. Pain controlled.       OBJECTIVE:     ** Physical Exam **  Gen: Alert, NAD  Resp: non labored breathing  Abd: softly distended, moderately tender, dressing CDI    ** Vital signs / I&O's **    Vital Signs Last 24 Hrs  T(C): 36.7 (15 Nov 2017 08:00), Max: 37.9 (14 Nov 2017 18:07)  T(F): 98 (15 Nov 2017 08:00), Max: 100.2 (14 Nov 2017 18:07)  HR: 98 (15 Nov 2017 08:00) (80 - 113)  BP: 131/71 (15 Nov 2017 08:00) (117/76 - 132/85)  BP(mean): --  RR: 18 (15 Nov 2017 08:00) (16 - 18)  SpO2: 96% (15 Nov 2017 08:00) (94% - 99%)      14 Nov 2017 07:01  -  15 Nov 2017 07:00  --------------------------------------------------------  IN:    lactated ringers.: 1600 mL  Total IN: 1600 mL    OUT:    Indwelling Catheter - Urethral: 1355 mL    Nasoenteral Tube: 675 mL  Total OUT: 2030 mL    Total NET: -430 mL      15 Nov 2017 07:01  -  15 Nov 2017 09:05  --------------------------------------------------------  IN:    lactated ringers.: 400 mL  Total IN: 400 mL    OUT:    Indwelling Catheter - Urethral: 220 mL    Nasoenteral Tube: 175 mL  Total OUT: 395 mL    Total NET: 5 mL            ** Labs **                          10.6   11.25 )-----------( 340      ( 15 Nov 2017 06:00 )             30.5     15 Nov 2017 06:00    141    |  100    |  7      ----------------------------<  68     3.4     |  25     |  0.58     Ca    7.8        15 Nov 2017 06:00  Phos  1.9       15 Nov 2017 06:00  Mg     1.8       15 Nov 2017 06:00        CAPILLARY BLOOD GLUCOSE                  MEDICATIONS  (STANDING):  enoxaparin Injectable 40 milliGRAM(s) SubCutaneous every 24 hours  HYDROmorphone PCA (1 mG/mL) 30 milliLiter(s) PCA Continuous PCA Continuous  influenza   Vaccine 0.5 milliLiter(s) IntraMuscular once  lactated ringers. 1000 milliLiter(s) (100 mL/Hr) IV Continuous <Continuous>    MEDICATIONS  (PRN):  benzocaine  15 mG/menthol 3.6 mG Oral Lozenge - Peds 2 Lozenge Oral every 3 hours PRN THRoat irritation  diphenhydrAMINE   Injectable 12.5 milliGRAM(s) IV Push every 4 hours PRN Pruritus  HYDROmorphone PCA (1 mG/mL) Rescue Clinician Bolus 0.5 milliGRAM(s) IV Push every 15 minutes PRN for Pain Scale GREATER THAN 6  naloxone Injectable 0.1 milliGRAM(s) IV Push every 3 minutes PRN For ANY of the following changes in patient status:  A. RR LESS THAN 10 breaths per minute, B. Oxygen saturation LESS THAN 90%, C. Sedation score of 6  ondansetron Injectable 4 milliGRAM(s) IV Push every 6 hours PRN Nausea
ANESTHESIA POSTOP CHECK    47y Female POSTOP DAY 1     No COMPLAINTS    NO APPARENT ANESTHESIA COMPLICATIONS
Anesthesia Pain Management Service    SUBJECTIVE: Pt doing well with IV PCA without problems reported.    Therapy:	  [ X] IV PCA	   [ ] Epidural           [ ] s/p Spinal Opoid              [ ] Postpartum infusion	  [ ] Patient controlled regional anesthesia (PCRA)    [ ] prn Analgesics    Allergies    IV Contrast (Unknown)    Intolerances      MEDICATIONS  (STANDING):  enoxaparin Injectable 40 milliGRAM(s) SubCutaneous every 24 hours  HYDROmorphone PCA (1 mG/mL) 30 milliLiter(s) PCA Continuous PCA Continuous  influenza   Vaccine 0.5 milliLiter(s) IntraMuscular once  lactated ringers. 1000 milliLiter(s) (100 mL/Hr) IV Continuous <Continuous>  potassium chloride  10 mEq/100 mL IVPB 10 milliEquivalent(s) IV Intermittent every 1 hour    MEDICATIONS  (PRN):  benzocaine  15 mG/menthol 3.6 mG Oral Lozenge - Peds 2 Lozenge Oral every 3 hours PRN THRoat irritation  dexamethasone  Injectable 4 milliGRAM(s) IV Push every 6 hours PRN Nausea, IF ondansetron is ineffective after 30 - 60 minute  diphenhydrAMINE   Injectable 12.5 milliGRAM(s) IV Push every 4 hours PRN Pruritus  HYDROmorphone  Injectable 1 milliGRAM(s) IV Push every 10 minutes PRN Severe Pain (7 - 10)  HYDROmorphone PCA (1 mG/mL) Rescue Clinician Bolus 0.5 milliGRAM(s) IV Push every 15 minutes PRN for Pain Scale GREATER THAN 6  naloxone Injectable 0.1 milliGRAM(s) IV Push every 3 minutes PRN For ANY of the following changes in patient status:  A. RR LESS THAN 10 breaths per minute, B. Oxygen saturation LESS THAN 90%, C. Sedation score of 6  ondansetron Injectable 4 milliGRAM(s) IV Push every 6 hours PRN Nausea      OBJECTIVE:   [X] No new signs     [ ] Other:    Side Effects:  [X ] None			[ ] Other:    Assessment of Catheter Site:		[ ] Intact		[ ] Other:    ASSESSMENT/PLAN  [ X] Continue current therapy    [ ] Therapy changed to:    [ ] IV PCA       [ ] Epidural     [ ] prn Analgesics     Comments:
Anesthesia Pain Management Service    SUBJECTIVE: Pt now off IV PCA without problems reported.    Therapy:	  [ X] IV PCA	   [ ] Epidural           [ ] s/p Spinal Opoid              [ ] Postpartum infusion	  [ ] Patient controlled regional anesthesia (PCRA)    [ ] prn Analgesics    Allergies    IV Contrast (Unknown)    Intolerances      MEDICATIONS  (STANDING):  dextrose 5% + sodium chloride 0.45% with potassium chloride 20 mEq/L 1000 milliLiter(s) (100 mL/Hr) IV Continuous <Continuous>  influenza   Vaccine 0.5 milliLiter(s) IntraMuscular once    MEDICATIONS  (PRN):  benzocaine  15 mG/menthol 3.6 mG Oral Lozenge - Peds 2 Lozenge Oral every 3 hours PRN THRoat irritation  HYDROmorphone  Injectable 0.5 milliGRAM(s) IV Push every 3 hours PRN Moderate and Severe Pain  naloxone Injectable 0.1 milliGRAM(s) IV Push every 3 minutes PRN For ANY of the following changes in patient status:  A. RR LESS THAN 10 breaths per minute, B. Oxygen saturation LESS THAN 90%, C. Sedation score of 6  ondansetron Injectable 4 milliGRAM(s) IV Push every 6 hours PRN Nausea      OBJECTIVE:   [X] No new signs     [ ] Other:    Side Effects:  [X ] None			[ ] Other:    Assessment of Catheter Site:		[ ] Intact		[ ] Other:    ASSESSMENT/PLAN  [ ] Continue current therapy    [X ] Therapy changed to:    [ ] IV PCA       [ ] Epidural     [ X] prn Analgesics     Comments: Opioids or Adjuvent analgesics to be used at this point.    Progress Note written now but Patient was seen earlier.
B Team Surgery Progress Note - pager [phone]    Patient is a 47y old  Female who presents with a chief complaint of Abdominal pain, nausea (13 Nov 2017 05:07)    SUBJECTIVE: Patient seen and examined on B team morning rounds. No acute events overnight.       OBJECTIVE:     ** Physical Exam **  Gen: Alert, NAD  Resp: non labored breathing  Abd: softly distended, moderately tender, dressing CDI    ** Vital signs / I&O's **    Vital Signs Last 24 Hrs  T(C): 37.3 (16 Nov 2017 05:25), Max: 38.2 (15 Nov 2017 17:58)  T(F): 99.1 (16 Nov 2017 05:25), Max: 100.7 (15 Nov 2017 17:58)  HR: 89 (16 Nov 2017 05:25) (89 - 99)  BP: 130/76 (16 Nov 2017 05:25) (120/77 - 132/81)  BP(mean): --  RR: 18 (16 Nov 2017 05:25) (17 - 18)  SpO2: 99% (16 Nov 2017 05:25) (99% - 100%)      15 Nov 2017 07:01  -  16 Nov 2017 07:00  --------------------------------------------------------  IN:    dextrose 5% + sodium chloride 0.45% with potassium chloride 20 mEq/L: 1600 mL    IV PiggyBack: 600 mL    lactated ringers.: 400 mL  Total IN: 2600 mL    OUT:    Indwelling Catheter - Urethral: 1820 mL    Nasoenteral Tube: 175 mL  Total OUT: 1995 mL    Total NET: 605 mL      16 Nov 2017 07:01  -  16 Nov 2017 12:03  --------------------------------------------------------  IN:  Total IN: 0 mL    OUT:    Indwelling Catheter - Urethral: 700 mL  Total OUT: 700 mL    Total NET: -700 mL            ** Labs **                          10.9   12.81 )-----------( 166      ( 16 Nov 2017 06:00 )             31.4     16 Nov 2017 06:00    139    |  102    |  4      ----------------------------<  115    3.6     |  24     |  0.53     Ca    7.8        16 Nov 2017 06:00  Phos  1.3       16 Nov 2017 06:00  Mg     2.1       16 Nov 2017 06:00      PT/INR - ( 16 Nov 2017 06:00 )   PT: 11.6 SEC;   INR: 1.03          PTT - ( 16 Nov 2017 06:00 )  PTT:26.4 SEC  CAPILLARY BLOOD GLUCOSE      POCT Blood Glucose.: 118 mg/dL (16 Nov 2017 05:59)  POCT Blood Glucose.: 140 mg/dL (15 Nov 2017 23:49)  POCT Blood Glucose.: 104 mg/dL (15 Nov 2017 17:15)  POCT Blood Glucose.: 101 mg/dL (15 Nov 2017 12:15)              MEDICATIONS  (STANDING):  dextrose 5% + sodium chloride 0.45% with potassium chloride 20 mEq/L 1000 milliLiter(s) (100 mL/Hr) IV Continuous <Continuous>  influenza   Vaccine 0.5 milliLiter(s) IntraMuscular once  sodium phosphate IVPB 15 milliMole(s) IV Intermittent once    MEDICATIONS  (PRN):  benzocaine  15 mG/menthol 3.6 mG Oral Lozenge - Peds 2 Lozenge Oral every 3 hours PRN THRoat irritation  HYDROmorphone  Injectable 0.5 milliGRAM(s) IV Push every 3 hours PRN Moderate and Severe Pain  naloxone Injectable 0.1 milliGRAM(s) IV Push every 3 minutes PRN For ANY of the following changes in patient status:  A. RR LESS THAN 10 breaths per minute, B. Oxygen saturation LESS THAN 90%, C. Sedation score of 6  ondansetron Injectable 4 milliGRAM(s) IV Push every 6 hours PRN Nausea
B Team Surgery Progress Note - pager [phone]    Patient is a 47y old  Female who presents with a chief complaint of Abdominal pain, nausea (18 Nov 2017 11:13)      SUBJECTIVE: Patient seen and examined on B team morning rounds. No acute events overnight.       OBJECTIVE:     ** Physical Exam **  Gen: Alert, NAD  Resp: Non labored breathing  Abd: soft, NT/ND. No rebound/guarding  Ext: FROM x 4    ** Vital signs / I&O's **    Vital Signs Last 24 Hrs  T(C): 37.3 (19 Nov 2017 10:07), Max: 37.9 (19 Nov 2017 09:34)  T(F): 99.2 (19 Nov 2017 10:07), Max: 100.3 (19 Nov 2017 09:34)  HR: 76 (19 Nov 2017 09:34) (76 - 92)  BP: 116/71 (19 Nov 2017 09:34) (101/74 - 129/81)  BP(mean): --  RR: 18 (19 Nov 2017 09:34) (16 - 18)  SpO2: 95% (19 Nov 2017 09:34) (95% - 100%)      18 Nov 2017 07:01  -  19 Nov 2017 07:00  --------------------------------------------------------  IN:    dextrose 5% + sodium chloride 0.45% with potassium chloride 20 mEq/L: 100 mL  Total IN: 100 mL    OUT:    Voided: 1200 mL  Total OUT: 1200 mL    Total NET: -1100 mL            ** Labs **                          11.0   11.55 )-----------( 378      ( 18 Nov 2017 06:15 )             31.5     18 Nov 2017 06:15    139    |  101    |  4      ----------------------------<  90     3.4     |  25     |  0.64     Ca    8.6        18 Nov 2017 06:15  Phos  2.9       18 Nov 2017 06:15  Mg     1.7       18 Nov 2017 06:15        CAPILLARY BLOOD GLUCOSE                  MEDICATIONS  (STANDING):  enoxaparin Injectable 40 milliGRAM(s) SubCutaneous every 24 hours  influenza   Vaccine 0.5 milliLiter(s) IntraMuscular once  sodium chloride 0.9% lock flush 3 milliLiter(s) IV Push every 8 hours    MEDICATIONS  (PRN):  acetaminophen    Suspension. 650 milliGRAM(s) Oral every 6 hours PRN Mild Pain (1 - 3)/moderate pain  benzocaine  15 mG/menthol 3.6 mG Oral Lozenge - Peds 2 Lozenge Oral every 3 hours PRN THRoat irritation  benzocaine 15 mG/menthol 3.6 mG Lozenge 1 Lozenge Oral daily PRN Sore Throat  naloxone Injectable 0.1 milliGRAM(s) IV Push every 3 minutes PRN For ANY of the following changes in patient status:  A. RR LESS THAN 10 breaths per minute, B. Oxygen saturation LESS THAN 90%, C. Sedation score of 6  ondansetron Injectable 4 milliGRAM(s) IV Push every 6 hours PRN Nausea
B Team Surgery Progress Note - pager [phone]    Patient is a 47y old  Female who presents with a chief complaint of Abdominal pain, nausea (13 Nov 2017 05:07)      SUBJECTIVE: Patient seen and examined on B team morning rounds. Patient stable in PACU. Pain controlled with PCA.       OBJECTIVE:     ** Physical Exam **  Gen: Alert, NAD  Abd: softly distended, moderately tender, dressing c/d/i    ** Vital signs / I&O's **    Vital Signs Last 24 Hrs  T(C): 36.8 (14 Nov 2017 12:00), Max: 37.2 (13 Nov 2017 23:16)  T(F): 98.2 (14 Nov 2017 12:00), Max: 99 (13 Nov 2017 23:16)  HR: 85 (14 Nov 2017 12:00) (75 - 94)  BP: 117/76 (14 Nov 2017 12:00) (110/57 - 142/74)  BP(mean): 90 (14 Nov 2017 01:50) (90 - 90)  RR: 16 (14 Nov 2017 12:00) (13 - 23)  SpO2: 95% (14 Nov 2017 12:00) (95% - 100%)      13 Nov 2017 07:01  -  14 Nov 2017 07:00  --------------------------------------------------------  IN:    lactated ringers.: 600 mL  Total IN: 600 mL    OUT:    Indwelling Catheter - Urethral: 495 mL  Total OUT: 495 mL    Total NET: 105 mL      14 Nov 2017 07:01  -  14 Nov 2017 12:41  --------------------------------------------------------  IN:    lactated ringers.: 400 mL  Total IN: 400 mL    OUT:    Indwelling Catheter - Urethral: 315 mL    Nasoenteral Tube: 100 mL  Total OUT: 415 mL    Total NET: -15 mL            ** Labs **                          12.2   15.24 )-----------( 386      ( 14 Nov 2017 06:00 )             34.7     14 Nov 2017 06:00    137    |  100    |  7      ----------------------------<  134    3.7     |  24     |  0.55     Ca    7.8        14 Nov 2017 06:00  Phos  3.1       14 Nov 2017 06:00  Mg     1.8       14 Nov 2017 06:00    TPro  8.6    /  Alb  4.7    /  TBili  0.6    /  DBili  x      /  AST  26     /  ALT  20     /  AlkPhos  81     13 Nov 2017 04:00    PT/INR - ( 13 Nov 2017 03:30 )   PT: 11.5 SEC;   INR: 1.03          PTT - ( 13 Nov 2017 03:30 )  PTT:32.0 SEC  CAPILLARY BLOOD GLUCOSE            LIVER FUNCTIONS - ( 13 Nov 2017 04:00 )  Alb: 4.7 g/dL / Pro: 8.6 g/dL / ALK PHOS: 81 u/L / ALT: 20 u/L / AST: 26 u/L / GGT: x               MEDICATIONS  (STANDING):  enoxaparin Injectable 40 milliGRAM(s) SubCutaneous every 24 hours  HYDROmorphone PCA (1 mG/mL) 30 milliLiter(s) PCA Continuous PCA Continuous  influenza   Vaccine 0.5 milliLiter(s) IntraMuscular once  lactated ringers. 1000 milliLiter(s) (100 mL/Hr) IV Continuous <Continuous>  potassium chloride  10 mEq/100 mL IVPB 10 milliEquivalent(s) IV Intermittent every 1 hour    MEDICATIONS  (PRN):  benzocaine  15 mG/menthol 3.6 mG Oral Lozenge - Peds 2 Lozenge Oral every 3 hours PRN THRoat irritation  dexamethasone  Injectable 4 milliGRAM(s) IV Push every 6 hours PRN Nausea, IF ondansetron is ineffective after 30 - 60 minute  diphenhydrAMINE   Injectable 12.5 milliGRAM(s) IV Push every 4 hours PRN Pruritus  HYDROmorphone  Injectable 1 milliGRAM(s) IV Push every 10 minutes PRN Severe Pain (7 - 10)  HYDROmorphone PCA (1 mG/mL) Rescue Clinician Bolus 0.5 milliGRAM(s) IV Push every 15 minutes PRN for Pain Scale GREATER THAN 6  naloxone Injectable 0.1 milliGRAM(s) IV Push every 3 minutes PRN For ANY of the following changes in patient status:  A. RR LESS THAN 10 breaths per minute, B. Oxygen saturation LESS THAN 90%, C. Sedation score of 6  ondansetron Injectable 4 milliGRAM(s) IV Push every 6 hours PRN Nausea
B Team Surgery Progress Note - pager [phone]    Patient is a 47y old  Female who presents with a chief complaint of Abdominal pain, nausea (13 Nov 2017 05:07)      SUBJECTIVE: Patient seen and examined on B team morning rounds. No acute events overnight. Patient has been tolerating clears, amenable to having regular diet. Has been passing gas and had a BM. Pain is well controlled.       OBJECTIVE:     ** Physical Exam **  Gen: Alert, NAD  Resp: non labored breathing  Abd: softly distended, appropriately tender, dressing CDI      ** Vital signs / I&O's **    Vital Signs Last 24 Hrs  T(C): 36.7 (18 Nov 2017 05:09), Max: 37.2 (17 Nov 2017 17:55)  T(F): 98 (18 Nov 2017 05:09), Max: 99 (17 Nov 2017 17:55)  HR: 79 (18 Nov 2017 05:09) (78 - 84)  BP: 130/72 (18 Nov 2017 05:09) (111/66 - 130/72)  BP(mean): --  RR: 18 (18 Nov 2017 05:09) (18 - 19)  SpO2: 98% (18 Nov 2017 05:09) (98% - 100%)      17 Nov 2017 07:01  -  18 Nov 2017 07:00  --------------------------------------------------------  IN:    dextrose 5% + sodium chloride 0.45% with potassium chloride 20 mEq/L: 800 mL  Total IN: 800 mL    OUT:    Indwelling Catheter - Urethral: 325 mL    Voided: 1075 mL  Total OUT: 1400 mL    Total NET: -600 mL            ** Labs **                          11.0   11.55 )-----------( 378      ( 18 Nov 2017 06:15 )             31.5     18 Nov 2017 06:15    139    |  101    |  4      ----------------------------<  90     3.4     |  25     |  0.64     Ca    8.6        18 Nov 2017 06:15  Phos  2.9       18 Nov 2017 06:15  Mg     1.7       18 Nov 2017 06:15        CAPILLARY BLOOD GLUCOSE                  MEDICATIONS  (STANDING):  dextrose 5% + sodium chloride 0.45% with potassium chloride 20 mEq/L 1000 milliLiter(s) (50 mL/Hr) IV Continuous <Continuous>  enoxaparin Injectable 40 milliGRAM(s) SubCutaneous every 24 hours  influenza   Vaccine 0.5 milliLiter(s) IntraMuscular once    MEDICATIONS  (PRN):  benzocaine  15 mG/menthol 3.6 mG Oral Lozenge - Peds 2 Lozenge Oral every 3 hours PRN THRoat irritation  benzocaine 15 mG/menthol 3.6 mG Lozenge 1 Lozenge Oral daily PRN Sore Throat  HYDROmorphone  Injectable 0.5 milliGRAM(s) IV Push every 3 hours PRN Moderate and Severe Pain  naloxone Injectable 0.1 milliGRAM(s) IV Push every 3 minutes PRN For ANY of the following changes in patient status:  A. RR LESS THAN 10 breaths per minute, B. Oxygen saturation LESS THAN 90%, C. Sedation score of 6  ondansetron Injectable 4 milliGRAM(s) IV Push every 6 hours PRN Nausea
B Team Surgery Progress Note - pager [phone]    SUBJECTIVE: Patient seen and examined on B team morning rounds. No acute events overnight.       OBJECTIVE:     ** Physical Exam **  Gen: Alert, NAD  Resp: non labored breathing  Abd: softly distended, appropriately tender, dressing CDI    ** Vital signs / I&O's **    Vital Signs Last 24 Hrs  T(C): 37.6 (17 Nov 2017 02:00), Max: 37.9 (16 Nov 2017 14:00)  T(F): 99.6 (17 Nov 2017 02:00), Max: 100.3 (16 Nov 2017 14:00)  HR: 80 (17 Nov 2017 02:00) (80 - 92)  BP: 112/67 (17 Nov 2017 02:00) (112/67 - 129/70)  BP(mean): --  RR: 19 (17 Nov 2017 02:00) (18 - 19)  SpO2: 98% (17 Nov 2017 02:00) (97% - 100%)      16 Nov 2017 07:01  -  17 Nov 2017 07:00  --------------------------------------------------------  IN:    dextrose 5% + sodium chloride 0.45% with potassium chloride 20 mEq/L: 2200 mL    IV PiggyBack: 500 mL  Total IN: 2700 mL    OUT:    Indwelling Catheter - Urethral: 2450 mL  Total OUT: 2450 mL    Total NET: 250 mL            ** Labs **                          11.6   12.42 )-----------( 333      ( 17 Nov 2017 06:30 )             32.9     17 Nov 2017 06:30    138    |  103    |  6      ----------------------------<  113    3.7     |  24     |  0.63     Ca    8.2        17 Nov 2017 06:30  Phos  2.6       17 Nov 2017 06:30  Mg     1.9       17 Nov 2017 06:30      PT/INR - ( 16 Nov 2017 06:00 )   PT: 11.6 SEC;   INR: 1.03          PTT - ( 16 Nov 2017 06:00 )  PTT:26.4 SEC  CAPILLARY BLOOD GLUCOSE      POCT Blood Glucose.: 106 mg/dL (16 Nov 2017 13:04)              MEDICATIONS  (STANDING):  dextrose 5% + sodium chloride 0.45% with potassium chloride 20 mEq/L 1000 milliLiter(s) (100 mL/Hr) IV Continuous <Continuous>  enoxaparin Injectable 40 milliGRAM(s) SubCutaneous every 24 hours  influenza   Vaccine 0.5 milliLiter(s) IntraMuscular once    MEDICATIONS  (PRN):  benzocaine  15 mG/menthol 3.6 mG Oral Lozenge - Peds 2 Lozenge Oral every 3 hours PRN THRoat irritation  HYDROmorphone  Injectable 0.5 milliGRAM(s) IV Push every 3 hours PRN Moderate and Severe Pain  naloxone Injectable 0.1 milliGRAM(s) IV Push every 3 minutes PRN For ANY of the following changes in patient status:  A. RR LESS THAN 10 breaths per minute, B. Oxygen saturation LESS THAN 90%, C. Sedation score of 6  ondansetron Injectable 4 milliGRAM(s) IV Push every 6 hours PRN Nausea
Day _3_ of Anesthesia Pain Management Service    Allergies  IV Contrast (Unknown)    SUBJECTIVE: "I'm not having a lot of pain."    Pain Scale Score	At rest: _3/10_ 	With Activity: ___ 	[ ] Refer to charted pain scores    THERAPY:    [ ] IV PCA Morphine		[ ] 5 mg/mL	[ ] 1 mg/mL  [X] IV PCA Hydromorphone	[ ] 5 mg/mL	[X] 1 mg/mL  [ ] IV PCA Fentanyl		[ ] 50 micrograms/mL    Demand dose _0.2mg_ lockout _6_ (minutes) Continuous Rate _0_ Total: _1.6mg_  Daily      MEDICATIONS  (STANDING):  dextrose 5% + sodium chloride 0.45% with potassium chloride 20 mEq/L 1000 milliLiter(s) (100 mL/Hr) IV Continuous <Continuous>  enoxaparin Injectable 40 milliGRAM(s) SubCutaneous every 24 hours  HYDROmorphone PCA (1 mG/mL) 30 milliLiter(s) PCA Continuous PCA Continuous  influenza   Vaccine 0.5 milliLiter(s) IntraMuscular once  potassium chloride  10 mEq/100 mL IVPB 10 milliEquivalent(s) IV Intermittent every 1 hour  potassium phosphate IVPB 15 milliMole(s) IV Intermittent once    MEDICATIONS  (PRN):  benzocaine  15 mG/menthol 3.6 mG Oral Lozenge - Peds 2 Lozenge Oral every 3 hours PRN THRoat irritation  diphenhydrAMINE   Injectable 12.5 milliGRAM(s) IV Push every 4 hours PRN Pruritus  HYDROmorphone PCA (1 mG/mL) Rescue Clinician Bolus 0.5 milliGRAM(s) IV Push every 15 minutes PRN for Pain Scale GREATER THAN 6  naloxone Injectable 0.1 milliGRAM(s) IV Push every 3 minutes PRN For ANY of the following changes in patient status:  A. RR LESS THAN 10 breaths per minute, B. Oxygen saturation LESS THAN 90%, C. Sedation score of 6  ondansetron Injectable 4 milliGRAM(s) IV Push every 6 hours PRN Nausea      OBJECTIVE: A&Ox3, NAD, supine in bed    Sedation Score:	[X] Alert	[ ] Drowsy	[ ] Arousable	[ ] Asleep	[ ] Unresponsive    Side Effects:	[X] None	[ ] Nausea	[ ] Vomiting	[ ] Pruritus  		  [ ] Weakness		[ ] Numbness	[ ] Other:                              10.6   11.25 )-----------( 340      ( 15 Nov 2017 06:00 )             30.5       11-15    141  |  100  |  7   ----------------------------<  68<L>  3.4<L>   |  25  |  0.58    Ca    7.8<L>      15 Nov 2017 06:00  Phos  1.9     11-15  Mg     1.8     11-15        ASSESSMENT/ PLAN    Therapy to  be:	[X] Continue   [ ] Discontinued   [ ] Change to prn Analgesics    Documentation and Verification of current medications:  [X] Done	[ ] Not done, not eligible  [ ] Not done, reason not given    Comments:  Remains NPO with NG Tube  Continued per patient's request
Day _3__ of Anesthesia Pain Management Service    SUBJECTIVE:    Therapy:	  [x ] IV PCA	   [ ] Epidural           [ ] s/p Spinal Opoid              [ ] Postpartum infusion	  [ ] Patient controlled regional anesthesia (PCRA)    [ ] prn Analgesics    OBJECTIVE:   [x ] No new signs     [ ] Other:    Side Effects:  [ x] None			[ ] Other:    Assessment of Catheter Site:		[ ] Intact		[ ] Other:    ASSESSMENT/PLAN  [ x] Continue current therapy    [ ] Therapy changed to:    [ ] IV PCA       [ ] Epidural     [ ] prn Analgesics     Comments:
Day _4_ of Anesthesia Pain Management Service    Allergies  IV Contrast (Unknown)    SUBJECTIVE:     Pain Scale Score	At rest: ___ 	With Activity: ___ 	[x] Refer to charted pain scores    THERAPY:    [ ] IV PCA Morphine		[ ] 5 mg/mL	[ ] 1 mg/mL  [X] IV PCA Hydromorphone	[ ] 5 mg/mL	[X] 1 mg/mL  [ ] IV PCA Fentanyl		[ ] 50 micrograms/mL    Demand dose _0.2mg_ lockout _6_ (minutes) Continuous Rate _0_ Total: _0mg Daily      MEDICATIONS  (STANDING):  dextrose 5% + sodium chloride 0.45% with potassium chloride 20 mEq/L 1000 milliLiter(s) (100 mL/Hr) IV Continuous <Continuous>  influenza   Vaccine 0.5 milliLiter(s) IntraMuscular once  sodium phosphate IVPB 15 milliMole(s) IV Intermittent once    MEDICATIONS  (PRN):  benzocaine  15 mG/menthol 3.6 mG Oral Lozenge - Peds 2 Lozenge Oral every 3 hours PRN THRoat irritation  HYDROmorphone  Injectable 0.5 milliGRAM(s) IV Push every 3 hours PRN Moderate and Severe Pain  naloxone Injectable 0.1 milliGRAM(s) IV Push every 3 minutes PRN For ANY of the following changes in patient status:  A. RR LESS THAN 10 breaths per minute, B. Oxygen saturation LESS THAN 90%, C. Sedation score of 6  ondansetron Injectable 4 milliGRAM(s) IV Push every 6 hours PRN Nausea      OBJECTIVE: A&Ox3, NAD, supine on stretcher, just returned from IR.    Sedation Score:	[X] Alert	[ ] Drowsy	[ ] Arousable	[ ] Asleep	[ ] Unresponsive    Side Effects:	[X] None	[ ] Nausea	[ ] Vomiting	[ ] Pruritus  		  [ ] Weakness		[ ] Numbness	[ ] Other:    PT/INR - ( 16 Nov 2017 06:00 )   PT: 11.6 SEC;   INR: 1.03     PTT - ( 16 Nov 2017 06:00 )  PTT:26.4 SEC                          10.9   12.81 )-----------( 166      ( 16 Nov 2017 06:00 )             31.4       11-16    139  |  102  |  4<L>  ----------------------------<  115<H>  3.6   |  24  |  0.53    Ca    7.8<L>      16 Nov 2017 06:00  Phos  1.3     11-16  Mg     2.1     11-16        ASSESSMENT/ PLAN    Therapy to  be:	[] Continue   [X Discontinued   [x Change to prn Analgesics    Documentation and Verification of current medications:  [X] Done	[ ] Not done, not eligible  [ ] Not done, reason not given    Comments:  Changed to PRN analgesics as patient did not require use of IV PCA overnight.
Day __1_ of Anesthesia Pain Management Service    Allergies    IV Contrast (Unknown)    Intolerances        SUBJECTIVE: "I'm doing ok, the pump is helping out"    Pain Scale Score	At rest: _2_ 	With Activity: ___ 	[ ] Refer to charted pain scores    THERAPY:    [ ] IV PCA Morphine		[ ] 5 mg/mL	[ ] 1 mg/mL  [X] IV PCA Hydromorphone	[ ] 5 mg/mL	[X] 1 mg/mL  [ ] IV PCA Fentanyl		[ ] 50 micrograms/mL    Demand dose _0.2mg_ lockout _6_ (minutes) Continuous Rate _0_ Total: _2.5mg__  Daily      MEDICATIONS  (STANDING):  enoxaparin Injectable 40 milliGRAM(s) SubCutaneous every 24 hours  HYDROmorphone PCA (1 mG/mL) 30 milliLiter(s) PCA Continuous PCA Continuous  influenza   Vaccine 0.5 milliLiter(s) IntraMuscular once  lactated ringers. 1000 milliLiter(s) (100 mL/Hr) IV Continuous <Continuous>    MEDICATIONS  (PRN):  benzocaine  15 mG/menthol 3.6 mG Oral Lozenge - Peds 2 Lozenge Oral every 3 hours PRN THRoat irritation  dexamethasone  Injectable 4 milliGRAM(s) IV Push every 6 hours PRN Nausea, IF ondansetron is ineffective after 30 - 60 minute  diphenhydrAMINE   Injectable 12.5 milliGRAM(s) IV Push every 4 hours PRN Pruritus  HYDROmorphone  Injectable 1 milliGRAM(s) IV Push every 10 minutes PRN Severe Pain (7 - 10)  HYDROmorphone PCA (1 mG/mL) Rescue Clinician Bolus 0.5 milliGRAM(s) IV Push every 15 minutes PRN for Pain Scale GREATER THAN 6  meperidine     Injectable 12.5 milliGRAM(s) IV Push every 10 minutes PRN Shivering  naloxone Injectable 0.1 milliGRAM(s) IV Push every 3 minutes PRN For ANY of the following changes in patient status:  A. RR LESS THAN 10 breaths per minute, B. Oxygen saturation LESS THAN 90%, C. Sedation score of 6  ondansetron Injectable 4 milliGRAM(s) IV Push every 6 hours PRN Nausea  ondansetron Injectable 4 milliGRAM(s) IV Push once PRN Nausea and/or Vomiting      OBJECTIVE: A&Ox3, NAD, supine in bed    Sedation Score:	[X] Alert	[ ] Drowsy	[ ] Arousable	[ ] Asleep	[ ] Unresponsive    Side Effects:	[X] None	[ ] Nausea	[ ] Vomiting	[ ] Pruritus  		  [ ] Weakness		[ ] Numbness	[ ] Other:    PT/INR - ( 13 Nov 2017 03:30 )   PT: 11.5 SEC;   INR: 1.03          PTT - ( 13 Nov 2017 03:30 )  PTT:32.0 SEC                          12.2   15.24 )-----------( 386      ( 14 Nov 2017 06:00 )             34.7       11-14    137  |  100  |  7   ----------------------------<  134<H>  3.7   |  24  |  0.55    Ca    7.8<L>      14 Nov 2017 06:00  Phos  3.1     11-14  Mg     1.8     11-14    TPro  8.6<H>  /  Alb  4.7  /  TBili  0.6  /  DBili  x   /  AST  26  /  ALT  20  /  AlkPhos  81  11-13      ASSESSMENT/ PLAN    Therapy to  be:	[X] Continue   [ ] Discontinued   [ ] Change to prn Analgesics    Documentation and Verification of current medications:  [X] Done	[ ] Not done, not eligible  [ ] Not done, reason not given    [ ]  MITCHELL ZIMMERMAN Reviewed and Copied to Chart    Comments: pt. in PACU continue current therapy.
Pre-Interventional Radiology Procedure Note    47y    Female    Procedure: Liver lesion biopsy    Diagnosis/Indication: Patient is a 47y old  Female who presents with a chief complaint of Abdominal pain, nausea (13 Nov 2017 05:07)      Interventional Radiology Attending Physician:     Ordering Attending Physician: Dr. aEton    PAST MEDICAL & SURGICAL HISTORY:  Anemia  Malignant neoplasm of small intestine  Neoplasm of digestive system  Vertigo  S/P laparoscopy: S/P Excsion left retroperitoneal mass 02/3/2014    s/p Ex-lap, NEELAM, repair of serosal tear POD #2       CBC Full  -  ( 16 Nov 2017 06:00 )  WBC Count : 12.81 K/uL  Hemoglobin : 10.9 g/dL  Hematocrit : 31.4 %  Platelet Count - Automated : 166 K/uL  Mean Cell Volume : 70.1 fL  Mean Cell Hemoglobin : 24.3 pg  Mean Cell Hemoglobin Concentration : 34.7 %      11-16    139  |  102  |  4<L>  ----------------------------<  115<H>  3.6   |  24  |  0.53    Ca    7.8<L>      16 Nov 2017 06:00  Phos  1.3     11-16  Mg     2.1     11-16      PT/INR - ( 16 Nov 2017 06:00 )   PT: 11.6 SEC;   INR: 1.03          PTT - ( 16 Nov 2017 06:00 )  PTT:26.4 SEC

## 2017-11-22 ENCOUNTER — APPOINTMENT (OUTPATIENT)
Dept: SURGERY | Facility: CLINIC | Age: 47
End: 2017-11-22
Payer: COMMERCIAL

## 2017-11-22 VITALS
BODY MASS INDEX: 24.29 KG/M2 | WEIGHT: 132 LBS | HEIGHT: 62 IN | TEMPERATURE: 98.3 F | DIASTOLIC BLOOD PRESSURE: 70 MMHG | SYSTOLIC BLOOD PRESSURE: 107 MMHG | HEART RATE: 80 BPM

## 2017-11-22 LAB — NON-GYNECOLOGICAL CYTOLOGY STUDY: SIGNIFICANT CHANGE UP

## 2017-11-22 PROCEDURE — 99024 POSTOP FOLLOW-UP VISIT: CPT

## 2017-11-29 ENCOUNTER — APPOINTMENT (OUTPATIENT)
Dept: HEMATOLOGY ONCOLOGY | Facility: CLINIC | Age: 47
End: 2017-11-29

## 2017-11-29 ENCOUNTER — APPOINTMENT (OUTPATIENT)
Dept: SURGERY | Facility: CLINIC | Age: 47
End: 2017-11-29
Payer: COMMERCIAL

## 2017-11-29 VITALS
WEIGHT: 132 LBS | BODY MASS INDEX: 24.29 KG/M2 | TEMPERATURE: 98.3 F | DIASTOLIC BLOOD PRESSURE: 67 MMHG | SYSTOLIC BLOOD PRESSURE: 95 MMHG | HEIGHT: 62 IN | HEART RATE: 82 BPM

## 2017-11-29 PROCEDURE — 99024 POSTOP FOLLOW-UP VISIT: CPT

## 2017-12-06 ENCOUNTER — APPOINTMENT (OUTPATIENT)
Dept: HEMATOLOGY ONCOLOGY | Facility: CLINIC | Age: 47
End: 2017-12-06
Payer: COMMERCIAL

## 2017-12-06 VITALS
TEMPERATURE: 98.6 F | SYSTOLIC BLOOD PRESSURE: 104 MMHG | BODY MASS INDEX: 22.58 KG/M2 | RESPIRATION RATE: 16 BRPM | DIASTOLIC BLOOD PRESSURE: 73 MMHG | HEART RATE: 74 BPM | OXYGEN SATURATION: 100 % | WEIGHT: 123.46 LBS

## 2017-12-06 PROCEDURE — 99214 OFFICE O/P EST MOD 30 MIN: CPT

## 2017-12-12 ENCOUNTER — OUTPATIENT (OUTPATIENT)
Dept: OUTPATIENT SERVICES | Facility: HOSPITAL | Age: 47
LOS: 1 days | Discharge: HOME | End: 2017-12-12

## 2017-12-12 DIAGNOSIS — Z98.89 OTHER SPECIFIED POSTPROCEDURAL STATES: Chronic | ICD-10-CM

## 2017-12-12 DIAGNOSIS — D3A.8 OTHER BENIGN NEUROENDOCRINE TUMORS: ICD-10-CM

## 2017-12-14 ENCOUNTER — APPOINTMENT (OUTPATIENT)
Dept: INFUSION THERAPY | Facility: HOSPITAL | Age: 47
End: 2017-12-14

## 2017-12-15 ENCOUNTER — CHART COPY (OUTPATIENT)
Age: 47
End: 2017-12-15

## 2017-12-22 ENCOUNTER — APPOINTMENT (OUTPATIENT)
Dept: INFUSION THERAPY | Facility: HOSPITAL | Age: 47
End: 2017-12-22

## 2018-01-11 ENCOUNTER — OUTPATIENT (OUTPATIENT)
Dept: OUTPATIENT SERVICES | Facility: HOSPITAL | Age: 48
LOS: 1 days | Discharge: HOME | End: 2018-01-11

## 2018-01-11 DIAGNOSIS — D3A.8 OTHER BENIGN NEUROENDOCRINE TUMORS: ICD-10-CM

## 2018-01-11 DIAGNOSIS — Z98.89 OTHER SPECIFIED POSTPROCEDURAL STATES: Chronic | ICD-10-CM

## 2018-01-12 ENCOUNTER — APPOINTMENT (OUTPATIENT)
Dept: HEMATOLOGY ONCOLOGY | Facility: CLINIC | Age: 48
End: 2018-01-12
Payer: COMMERCIAL

## 2018-01-12 VITALS
HEART RATE: 71 BPM | RESPIRATION RATE: 16 BRPM | TEMPERATURE: 98 F | SYSTOLIC BLOOD PRESSURE: 110 MMHG | BODY MASS INDEX: 22.58 KG/M2 | OXYGEN SATURATION: 100 % | WEIGHT: 123.46 LBS | DIASTOLIC BLOOD PRESSURE: 70 MMHG

## 2018-01-12 PROCEDURE — 99214 OFFICE O/P EST MOD 30 MIN: CPT

## 2018-01-22 ENCOUNTER — APPOINTMENT (OUTPATIENT)
Dept: INFUSION THERAPY | Facility: HOSPITAL | Age: 48
End: 2018-01-22

## 2018-01-22 ENCOUNTER — OTHER (OUTPATIENT)
Age: 48
End: 2018-01-22

## 2018-02-14 ENCOUNTER — OUTPATIENT (OUTPATIENT)
Dept: OUTPATIENT SERVICES | Facility: HOSPITAL | Age: 48
LOS: 1 days | Discharge: HOME | End: 2018-02-14

## 2018-02-14 DIAGNOSIS — D3A.8 OTHER BENIGN NEUROENDOCRINE TUMORS: ICD-10-CM

## 2018-02-14 DIAGNOSIS — Z98.89 OTHER SPECIFIED POSTPROCEDURAL STATES: Chronic | ICD-10-CM

## 2018-02-20 ENCOUNTER — APPOINTMENT (OUTPATIENT)
Dept: INFUSION THERAPY | Facility: HOSPITAL | Age: 48
End: 2018-02-20

## 2018-03-12 ENCOUNTER — RESULT REVIEW (OUTPATIENT)
Age: 48
End: 2018-03-12

## 2018-03-12 ENCOUNTER — APPOINTMENT (OUTPATIENT)
Dept: HEMATOLOGY ONCOLOGY | Facility: CLINIC | Age: 48
End: 2018-03-12
Payer: COMMERCIAL

## 2018-03-12 VITALS
TEMPERATURE: 97.9 F | SYSTOLIC BLOOD PRESSURE: 123 MMHG | DIASTOLIC BLOOD PRESSURE: 81 MMHG | BODY MASS INDEX: 22.18 KG/M2 | HEART RATE: 65 BPM | RESPIRATION RATE: 16 BRPM | OXYGEN SATURATION: 100 % | WEIGHT: 121.25 LBS

## 2018-03-12 LAB
ALBUMIN SERPL ELPH-MCNC: 4.4 G/DL
ALP BLD-CCNC: 63 U/L
ALT SERPL-CCNC: 9 U/L
ANION GAP SERPL CALC-SCNC: 12 MMOL/L
AST SERPL-CCNC: 14 U/L
BASOPHILS # BLD AUTO: 0 K/UL — SIGNIFICANT CHANGE UP (ref 0–0.2)
BASOPHILS NFR BLD AUTO: 0.6 % — SIGNIFICANT CHANGE UP (ref 0–2)
BILIRUB SERPL-MCNC: 0.3 MG/DL
BUN SERPL-MCNC: 9 MG/DL
CALCIUM SERPL-MCNC: 9.8 MG/DL
CHLORIDE SERPL-SCNC: 99 MMOL/L
CO2 SERPL-SCNC: 28 MMOL/L
CREAT SERPL-MCNC: 0.97 MG/DL
EOSINOPHIL # BLD AUTO: 0.2 K/UL — SIGNIFICANT CHANGE UP (ref 0–0.5)
EOSINOPHIL NFR BLD AUTO: 2.4 % — SIGNIFICANT CHANGE UP (ref 0–6)
GLUCOSE SERPL-MCNC: 93 MG/DL
HCT VFR BLD CALC: 35.9 % — SIGNIFICANT CHANGE UP (ref 34.5–45)
HGB BLD-MCNC: 12.6 G/DL — SIGNIFICANT CHANGE UP (ref 11.5–15.5)
LDH SERPL-CCNC: 214 U/L
LYMPHOCYTES # BLD AUTO: 2.1 K/UL — SIGNIFICANT CHANGE UP (ref 1–3.3)
LYMPHOCYTES # BLD AUTO: 24.8 % — SIGNIFICANT CHANGE UP (ref 13–44)
MCHC RBC-ENTMCNC: 25.5 PG — LOW (ref 27–34)
MCHC RBC-ENTMCNC: 35 G/DL — SIGNIFICANT CHANGE UP (ref 32–36)
MCV RBC AUTO: 72.9 FL — LOW (ref 80–100)
MONOCYTES # BLD AUTO: 0.5 K/UL — SIGNIFICANT CHANGE UP (ref 0–0.9)
MONOCYTES NFR BLD AUTO: 5.8 % — SIGNIFICANT CHANGE UP (ref 2–14)
NEUTROPHILS # BLD AUTO: 5.6 K/UL — SIGNIFICANT CHANGE UP (ref 1.8–7.4)
NEUTROPHILS NFR BLD AUTO: 66.4 % — SIGNIFICANT CHANGE UP (ref 43–77)
PLATELET # BLD AUTO: 402 K/UL — HIGH (ref 150–400)
POTASSIUM SERPL-SCNC: 4.8 MMOL/L
PROT SERPL-MCNC: 7.5 G/DL
RBC # BLD: 4.92 M/UL — SIGNIFICANT CHANGE UP (ref 3.8–5.2)
RBC # FLD: 16.9 % — HIGH (ref 10.3–14.5)
SODIUM SERPL-SCNC: 139 MMOL/L
WBC # BLD: 8.5 K/UL — SIGNIFICANT CHANGE UP (ref 3.8–10.5)
WBC # FLD AUTO: 8.5 K/UL — SIGNIFICANT CHANGE UP (ref 3.8–10.5)

## 2018-03-12 PROCEDURE — 99214 OFFICE O/P EST MOD 30 MIN: CPT

## 2018-03-14 LAB — CGA SERPL-MCNC: 213 NG/ML

## 2018-04-04 LAB — SEROTONIN SERUM: NORMAL

## 2018-04-08 ENCOUNTER — FORM ENCOUNTER (OUTPATIENT)
Age: 48
End: 2018-04-08

## 2018-04-09 ENCOUNTER — APPOINTMENT (OUTPATIENT)
Dept: CT IMAGING | Facility: IMAGING CENTER | Age: 48
End: 2018-04-09
Payer: COMMERCIAL

## 2018-04-09 ENCOUNTER — OUTPATIENT (OUTPATIENT)
Dept: OUTPATIENT SERVICES | Facility: HOSPITAL | Age: 48
LOS: 1 days | End: 2018-04-09
Payer: COMMERCIAL

## 2018-04-09 DIAGNOSIS — Z98.89 OTHER SPECIFIED POSTPROCEDURAL STATES: Chronic | ICD-10-CM

## 2018-04-09 DIAGNOSIS — D3A.8 OTHER BENIGN NEUROENDOCRINE TUMORS: ICD-10-CM

## 2018-04-09 PROCEDURE — 74177 CT ABD & PELVIS W/CONTRAST: CPT | Mod: 26

## 2018-04-09 PROCEDURE — 71260 CT THORAX DX C+: CPT | Mod: 26

## 2018-04-09 PROCEDURE — 71260 CT THORAX DX C+: CPT

## 2018-04-09 PROCEDURE — 74177 CT ABD & PELVIS W/CONTRAST: CPT

## 2018-05-24 ENCOUNTER — OUTPATIENT (OUTPATIENT)
Dept: OUTPATIENT SERVICES | Facility: HOSPITAL | Age: 48
LOS: 1 days | Discharge: HOME | End: 2018-05-24

## 2018-05-24 DIAGNOSIS — Z98.89 OTHER SPECIFIED POSTPROCEDURAL STATES: Chronic | ICD-10-CM

## 2018-05-24 DIAGNOSIS — D3A.8 OTHER BENIGN NEUROENDOCRINE TUMORS: ICD-10-CM

## 2018-06-06 ENCOUNTER — APPOINTMENT (OUTPATIENT)
Dept: HEMATOLOGY ONCOLOGY | Facility: CLINIC | Age: 48
End: 2018-06-06
Payer: COMMERCIAL

## 2018-06-06 VITALS
WEIGHT: 126.75 LBS | BODY MASS INDEX: 23.18 KG/M2 | HEART RATE: 71 BPM | SYSTOLIC BLOOD PRESSURE: 123 MMHG | DIASTOLIC BLOOD PRESSURE: 83 MMHG | OXYGEN SATURATION: 100 % | TEMPERATURE: 97.7 F | RESPIRATION RATE: 16 BRPM

## 2018-06-06 PROCEDURE — 99215 OFFICE O/P EST HI 40 MIN: CPT

## 2018-06-11 ENCOUNTER — APPOINTMENT (OUTPATIENT)
Dept: INFUSION THERAPY | Facility: HOSPITAL | Age: 48
End: 2018-06-11

## 2018-07-02 ENCOUNTER — OUTPATIENT (OUTPATIENT)
Dept: OUTPATIENT SERVICES | Facility: HOSPITAL | Age: 48
LOS: 1 days | Discharge: HOME | End: 2018-07-02

## 2018-07-02 DIAGNOSIS — Z98.89 OTHER SPECIFIED POSTPROCEDURAL STATES: Chronic | ICD-10-CM

## 2018-07-02 DIAGNOSIS — D3A.8 OTHER BENIGN NEUROENDOCRINE TUMORS: ICD-10-CM

## 2018-07-03 ENCOUNTER — APPOINTMENT (OUTPATIENT)
Dept: INFUSION THERAPY | Facility: HOSPITAL | Age: 48
End: 2018-07-03

## 2018-08-02 ENCOUNTER — OUTPATIENT (OUTPATIENT)
Dept: OUTPATIENT SERVICES | Facility: HOSPITAL | Age: 48
LOS: 1 days | Discharge: HOME | End: 2018-08-02

## 2018-08-02 DIAGNOSIS — D3A.8 OTHER BENIGN NEUROENDOCRINE TUMORS: ICD-10-CM

## 2018-08-02 DIAGNOSIS — Z98.89 OTHER SPECIFIED POSTPROCEDURAL STATES: Chronic | ICD-10-CM

## 2018-08-06 ENCOUNTER — APPOINTMENT (OUTPATIENT)
Dept: INFUSION THERAPY | Facility: HOSPITAL | Age: 48
End: 2018-08-06

## 2018-08-27 ENCOUNTER — APPOINTMENT (OUTPATIENT)
Dept: HEMATOLOGY ONCOLOGY | Facility: CLINIC | Age: 48
End: 2018-08-27
Payer: COMMERCIAL

## 2018-08-27 VITALS
TEMPERATURE: 98.4 F | HEART RATE: 65 BPM | RESPIRATION RATE: 16 BRPM | SYSTOLIC BLOOD PRESSURE: 121 MMHG | OXYGEN SATURATION: 99 % | WEIGHT: 130.07 LBS | BODY MASS INDEX: 23.79 KG/M2 | DIASTOLIC BLOOD PRESSURE: 76 MMHG

## 2018-08-27 PROCEDURE — 99214 OFFICE O/P EST MOD 30 MIN: CPT

## 2018-08-29 ENCOUNTER — LABORATORY RESULT (OUTPATIENT)
Age: 48
End: 2018-08-29

## 2018-08-30 ENCOUNTER — OUTPATIENT (OUTPATIENT)
Dept: OUTPATIENT SERVICES | Facility: HOSPITAL | Age: 48
LOS: 1 days | Discharge: HOME | End: 2018-08-30

## 2018-08-30 DIAGNOSIS — D3A.8 OTHER BENIGN NEUROENDOCRINE TUMORS: ICD-10-CM

## 2018-08-30 DIAGNOSIS — Z98.89 OTHER SPECIFIED POSTPROCEDURAL STATES: Chronic | ICD-10-CM

## 2018-09-05 ENCOUNTER — APPOINTMENT (OUTPATIENT)
Dept: INFUSION THERAPY | Facility: HOSPITAL | Age: 48
End: 2018-09-05

## 2018-09-18 ENCOUNTER — LABORATORY RESULT (OUTPATIENT)
Age: 48
End: 2018-09-18

## 2018-09-21 ENCOUNTER — FORM ENCOUNTER (OUTPATIENT)
Age: 48
End: 2018-09-21

## 2018-09-22 ENCOUNTER — OUTPATIENT (OUTPATIENT)
Dept: OUTPATIENT SERVICES | Facility: HOSPITAL | Age: 48
LOS: 1 days | End: 2018-09-22
Payer: COMMERCIAL

## 2018-09-22 ENCOUNTER — APPOINTMENT (OUTPATIENT)
Dept: CT IMAGING | Facility: IMAGING CENTER | Age: 48
End: 2018-09-22
Payer: COMMERCIAL

## 2018-09-22 DIAGNOSIS — D3A.8 OTHER BENIGN NEUROENDOCRINE TUMORS: ICD-10-CM

## 2018-09-22 DIAGNOSIS — Z98.89 OTHER SPECIFIED POSTPROCEDURAL STATES: Chronic | ICD-10-CM

## 2018-09-22 PROCEDURE — 74177 CT ABD & PELVIS W/CONTRAST: CPT | Mod: 26

## 2018-09-22 PROCEDURE — 74177 CT ABD & PELVIS W/CONTRAST: CPT

## 2018-09-27 ENCOUNTER — OUTPATIENT (OUTPATIENT)
Dept: OUTPATIENT SERVICES | Facility: HOSPITAL | Age: 48
LOS: 1 days | Discharge: HOME | End: 2018-09-27

## 2018-09-27 DIAGNOSIS — D3A.8 OTHER BENIGN NEUROENDOCRINE TUMORS: ICD-10-CM

## 2018-09-27 DIAGNOSIS — Z98.89 OTHER SPECIFIED POSTPROCEDURAL STATES: Chronic | ICD-10-CM

## 2018-10-01 ENCOUNTER — APPOINTMENT (OUTPATIENT)
Dept: HEMATOLOGY ONCOLOGY | Facility: CLINIC | Age: 48
End: 2018-10-01
Payer: COMMERCIAL

## 2018-10-01 VITALS
WEIGHT: 127.87 LBS | HEART RATE: 74 BPM | RESPIRATION RATE: 17 BRPM | BODY MASS INDEX: 23.39 KG/M2 | OXYGEN SATURATION: 99 % | SYSTOLIC BLOOD PRESSURE: 105 MMHG | DIASTOLIC BLOOD PRESSURE: 65 MMHG | TEMPERATURE: 98.8 F

## 2018-10-01 PROCEDURE — 99214 OFFICE O/P EST MOD 30 MIN: CPT

## 2018-10-09 ENCOUNTER — APPOINTMENT (OUTPATIENT)
Dept: INFUSION THERAPY | Facility: HOSPITAL | Age: 48
End: 2018-10-09

## 2018-10-31 ENCOUNTER — OUTPATIENT (OUTPATIENT)
Dept: OUTPATIENT SERVICES | Facility: HOSPITAL | Age: 48
LOS: 1 days | Discharge: HOME | End: 2018-10-31

## 2018-10-31 DIAGNOSIS — Z98.89 OTHER SPECIFIED POSTPROCEDURAL STATES: Chronic | ICD-10-CM

## 2018-10-31 DIAGNOSIS — D3A.8 OTHER BENIGN NEUROENDOCRINE TUMORS: ICD-10-CM

## 2018-11-08 ENCOUNTER — APPOINTMENT (OUTPATIENT)
Dept: INFUSION THERAPY | Facility: HOSPITAL | Age: 48
End: 2018-11-08

## 2018-12-04 ENCOUNTER — OUTPATIENT (OUTPATIENT)
Dept: OUTPATIENT SERVICES | Facility: HOSPITAL | Age: 48
LOS: 1 days | Discharge: HOME | End: 2018-12-04

## 2018-12-04 DIAGNOSIS — D3A.8 OTHER BENIGN NEUROENDOCRINE TUMORS: ICD-10-CM

## 2018-12-04 DIAGNOSIS — Z98.89 OTHER SPECIFIED POSTPROCEDURAL STATES: Chronic | ICD-10-CM

## 2018-12-10 ENCOUNTER — APPOINTMENT (OUTPATIENT)
Dept: HEMATOLOGY ONCOLOGY | Facility: CLINIC | Age: 48
End: 2018-12-10

## 2018-12-10 ENCOUNTER — APPOINTMENT (OUTPATIENT)
Dept: INFUSION THERAPY | Facility: HOSPITAL | Age: 48
End: 2018-12-10

## 2018-12-31 ENCOUNTER — APPOINTMENT (OUTPATIENT)
Dept: HEMATOLOGY ONCOLOGY | Facility: CLINIC | Age: 48
End: 2018-12-31
Payer: COMMERCIAL

## 2018-12-31 VITALS
RESPIRATION RATE: 17 BRPM | WEIGHT: 126.74 LBS | TEMPERATURE: 98 F | SYSTOLIC BLOOD PRESSURE: 136 MMHG | DIASTOLIC BLOOD PRESSURE: 79 MMHG | BODY MASS INDEX: 23.18 KG/M2 | OXYGEN SATURATION: 100 % | HEART RATE: 62 BPM

## 2018-12-31 PROCEDURE — 99214 OFFICE O/P EST MOD 30 MIN: CPT

## 2018-12-31 NOTE — HISTORY OF PRESENT ILLNESS
[Disease: _____________________] : Disease: [unfilled] [T: ___] : T[unfilled] [N: ___] : N[unfilled] [M: ___] : M[unfilled] [AJCC Stage: ____] : AJCC Stage: [unfilled] [de-identified] : Ms. Franklin was a new set of health still December of 2013 when she noticed some vague abdominal pain at which time she presented to the emergency room was found to have a mesenteric mass measuring 4.2 cm in greatest dimension by the aortic bifurcation. She eventually underwent a laparoscopic biopsy by Dr. Dennis Singh is found to be consistent with a neuroendocrine tumor. Patient was eventually seen by a gastroenterologist Dr. Adi Patel who performed an upper endoscopy and colonoscopy that was unremarkable and was socially followed by a capsule study that revealed a mass in the jejunum patient was subsequently taken to the OR by Dr. 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  since there is no evidence of disease at that time and no evidence of metastatic disease after discussion with the patient and her presentation at tumor patient was placed on surveillance therapy off treatment.\par \par In November 2017 found to have metastatic disease and started on lanreotide [de-identified] : chromogranin and serotonin [FreeTextEntry1] : Lanreotide  [de-identified] : Amairani comes today for follow up while on Lanreotide and  denies diarrhea or abdominal pain \par She is unsure if she is getting flushing or hot flashes

## 2018-12-31 NOTE — PHYSICAL EXAM
[Fully active, able to carry on all pre-disease performance without restriction] : Status 0 - Fully active, able to carry on all pre-disease performance without restriction [Normal] : affect appropriate [de-identified] :   well healed incisions

## 2019-01-03 ENCOUNTER — OUTPATIENT (OUTPATIENT)
Dept: OUTPATIENT SERVICES | Facility: HOSPITAL | Age: 49
LOS: 1 days | Discharge: HOME | End: 2019-01-03

## 2019-01-03 DIAGNOSIS — D3A.8 OTHER BENIGN NEUROENDOCRINE TUMORS: ICD-10-CM

## 2019-01-03 DIAGNOSIS — Z98.89 OTHER SPECIFIED POSTPROCEDURAL STATES: Chronic | ICD-10-CM

## 2019-01-09 ENCOUNTER — APPOINTMENT (OUTPATIENT)
Dept: HEMATOLOGY ONCOLOGY | Facility: CLINIC | Age: 49
End: 2019-01-09

## 2019-01-09 ENCOUNTER — RESULT REVIEW (OUTPATIENT)
Age: 49
End: 2019-01-09

## 2019-01-09 ENCOUNTER — APPOINTMENT (OUTPATIENT)
Dept: INFUSION THERAPY | Facility: HOSPITAL | Age: 49
End: 2019-01-09

## 2019-01-09 LAB
BASOPHILS # BLD AUTO: 0.1 K/UL — SIGNIFICANT CHANGE UP (ref 0–0.2)
BASOPHILS NFR BLD AUTO: 0.6 % — SIGNIFICANT CHANGE UP (ref 0–2)
EOSINOPHIL # BLD AUTO: 0.2 K/UL — SIGNIFICANT CHANGE UP (ref 0–0.5)
EOSINOPHIL NFR BLD AUTO: 2 % — SIGNIFICANT CHANGE UP (ref 0–6)
HCT VFR BLD CALC: 38.2 % — SIGNIFICANT CHANGE UP (ref 34.5–45)
HGB BLD-MCNC: 13.5 G/DL — SIGNIFICANT CHANGE UP (ref 11.5–15.5)
LYMPHOCYTES # BLD AUTO: 2.4 K/UL — SIGNIFICANT CHANGE UP (ref 1–3.3)
LYMPHOCYTES # BLD AUTO: 25.4 % — SIGNIFICANT CHANGE UP (ref 13–44)
MCHC RBC-ENTMCNC: 26.5 PG — LOW (ref 27–34)
MCHC RBC-ENTMCNC: 35.3 G/DL — SIGNIFICANT CHANGE UP (ref 32–36)
MCV RBC AUTO: 75 FL — LOW (ref 80–100)
MONOCYTES # BLD AUTO: 0.6 K/UL — SIGNIFICANT CHANGE UP (ref 0–0.9)
MONOCYTES NFR BLD AUTO: 6.9 % — SIGNIFICANT CHANGE UP (ref 2–14)
NEUTROPHILS # BLD AUTO: 6.1 K/UL — SIGNIFICANT CHANGE UP (ref 1.8–7.4)
NEUTROPHILS NFR BLD AUTO: 65.1 % — SIGNIFICANT CHANGE UP (ref 43–77)
PLATELET # BLD AUTO: 445 K/UL — HIGH (ref 150–400)
RBC # BLD: 5.09 M/UL — SIGNIFICANT CHANGE UP (ref 3.8–5.2)
RBC # FLD: 14.7 % — HIGH (ref 10.3–14.5)
WBC # BLD: 9.4 K/UL — SIGNIFICANT CHANGE UP (ref 3.8–10.5)
WBC # FLD AUTO: 9.4 K/UL — SIGNIFICANT CHANGE UP (ref 3.8–10.5)

## 2019-01-11 LAB
ALBUMIN SERPL ELPH-MCNC: 4.8 G/DL
ALP BLD-CCNC: 70 U/L
ALT SERPL-CCNC: 9 U/L
ANION GAP SERPL CALC-SCNC: 13 MMOL/L
AST SERPL-CCNC: 20 U/L
BILIRUB SERPL-MCNC: 0.3 MG/DL
BUN SERPL-MCNC: 8 MG/DL
CALCIUM SERPL-MCNC: 9.8 MG/DL
CHLORIDE SERPL-SCNC: 102 MMOL/L
CO2 SERPL-SCNC: 28 MMOL/L
CREAT SERPL-MCNC: 0.78 MG/DL
GLUCOSE SERPL-MCNC: 90 MG/DL
POTASSIUM SERPL-SCNC: 4.3 MMOL/L
PROT SERPL-MCNC: 7.8 G/DL
SODIUM SERPL-SCNC: 143 MMOL/L

## 2019-01-14 LAB — CGA SERPL-MCNC: 253 NG/ML

## 2019-01-15 LAB — SEROTONIN SERUM: 1235 NG/ML

## 2019-01-26 ENCOUNTER — FORM ENCOUNTER (OUTPATIENT)
Age: 49
End: 2019-01-26

## 2019-01-26 ENCOUNTER — OUTPATIENT (OUTPATIENT)
Dept: OUTPATIENT SERVICES | Facility: HOSPITAL | Age: 49
LOS: 1 days | End: 2019-01-26
Payer: COMMERCIAL

## 2019-01-26 ENCOUNTER — APPOINTMENT (OUTPATIENT)
Dept: CT IMAGING | Facility: IMAGING CENTER | Age: 49
End: 2019-01-26
Payer: COMMERCIAL

## 2019-01-26 DIAGNOSIS — Z00.8 ENCOUNTER FOR OTHER GENERAL EXAMINATION: ICD-10-CM

## 2019-01-26 DIAGNOSIS — Z98.89 OTHER SPECIFIED POSTPROCEDURAL STATES: Chronic | ICD-10-CM

## 2019-01-26 PROCEDURE — 71260 CT THORAX DX C+: CPT | Mod: 26

## 2019-01-26 PROCEDURE — 74177 CT ABD & PELVIS W/CONTRAST: CPT

## 2019-01-26 PROCEDURE — 71260 CT THORAX DX C+: CPT

## 2019-01-27 PROCEDURE — 74177 CT ABD & PELVIS W/CONTRAST: CPT | Mod: 26

## 2019-01-31 ENCOUNTER — OUTPATIENT (OUTPATIENT)
Dept: OUTPATIENT SERVICES | Facility: HOSPITAL | Age: 49
LOS: 1 days | Discharge: HOME | End: 2019-01-31

## 2019-01-31 DIAGNOSIS — D3A.8 OTHER BENIGN NEUROENDOCRINE TUMORS: ICD-10-CM

## 2019-01-31 DIAGNOSIS — Z98.89 OTHER SPECIFIED POSTPROCEDURAL STATES: Chronic | ICD-10-CM

## 2019-02-08 ENCOUNTER — APPOINTMENT (OUTPATIENT)
Dept: INFUSION THERAPY | Facility: HOSPITAL | Age: 49
End: 2019-02-08

## 2019-02-08 ENCOUNTER — APPOINTMENT (OUTPATIENT)
Dept: HEMATOLOGY ONCOLOGY | Facility: CLINIC | Age: 49
End: 2019-02-08
Payer: COMMERCIAL

## 2019-02-08 VITALS
DIASTOLIC BLOOD PRESSURE: 70 MMHG | WEIGHT: 127.87 LBS | BODY MASS INDEX: 23.39 KG/M2 | OXYGEN SATURATION: 99 % | HEART RATE: 74 BPM | RESPIRATION RATE: 18 BRPM | TEMPERATURE: 98.3 F | SYSTOLIC BLOOD PRESSURE: 118 MMHG

## 2019-02-08 PROCEDURE — 99214 OFFICE O/P EST MOD 30 MIN: CPT

## 2019-02-08 NOTE — PHYSICAL EXAM
[Fully active, able to carry on all pre-disease performance without restriction] : Status 0 - Fully active, able to carry on all pre-disease performance without restriction [Normal] : affect appropriate [de-identified] :   well healed incisions

## 2019-02-08 NOTE — HISTORY OF PRESENT ILLNESS
[Disease: _____________________] : Disease: [unfilled] [T: ___] : T[unfilled] [N: ___] : N[unfilled] [M: ___] : M[unfilled] [AJCC Stage: ____] : AJCC Stage: [unfilled] [de-identified] : Ms. Franklin was a new set of health still December of 2013 when she noticed some vague abdominal pain at which time she presented to the emergency room was found to have a mesenteric mass measuring 4.2 cm in greatest dimension by the aortic bifurcation. She eventually underwent a laparoscopic biopsy by Dr. Dennis Singh is found to be consistent with a neuroendocrine tumor. Patient was eventually seen by a gastroenterologist Dr. Adi Patel who performed an upper endoscopy and colonoscopy that was unremarkable and was socially followed by a capsule study that revealed a mass in the jejunum patient was subsequently taken to the OR by Dr. 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  since there is no evidence of disease at that time and no evidence of metastatic disease after discussion with the patient and her presentation at tumor patient was placed on surveillance therapy off treatment.\par \par In November 2017 found to have metastatic disease and started on lanreotide [de-identified] : chromogranin and serotonin [FreeTextEntry1] : Lanreotide  [de-identified] : Amairani comes today for follow up while on Lanreotide and  denies diarrhea or abdominal pain \par She is here to review imaging that she had done recently

## 2019-02-08 NOTE — RESULTS/DATA
[FreeTextEntry1] : EXAM: CT CHEST IC \par \par EXAM: CT ABDOMEN AND PELVIS OC IC \par \par \par PROCEDURE DATE: 01/27/2019 \par \par \par \par INTERPRETATION: CLINICAL INFORMATION: Metastatic neuroendocrine carcinoma \par on therapy for follow-up. \par \par COMPARISON: Prior CTs, the most recent of which are abdominal CT dated \par 9/22/2018 and chest, abdomen, and pelvis CT dated 4/9/2018. \par \par PROCEDURE: \par CT of the Chest, Abdomen and Pelvis was performed with intravenous contrast. \par Intravenous contrast: 90 ml Omnipaque 350. 10 ml discarded. \par Oral contrast: positive contrast was administered. \par Sagittal and coronal reformats were performed. \par \par FINDINGS: \par \par CHEST: \par \par LUNGS AND LARGE AIRWAYS: Patent central airways. A 0.4 cm groundglass nodule \par in the left lower lobe (2:29) in retrospect is unchanged since prior CTs \par dating back to 5/31/2014. \par PLEURA: No pleural effusion. \par VESSELS: Within normal limits. \par HEART: Heart size is normal. No pericardial effusion. \par MEDIASTINUM AND EDITH: No lymphadenopathy. \par CHEST WALL AND LOWER NECK: Small nonspecific axillary lymph nodes, unchanged. \par \par ABDOMEN AND PELVIS: \par \par LIVER: Liver metastases with reference lesions as follows: \par \par Hepatic dome (2:47) measures 1.7 x 1.7 cm, previously 2 x 2 cm. \par Segment 7 lesion (2:54) measures 1.8 x 1.6 cm, previously 1.9 x 1.8 cm. \par Segment 5 lesion (2:69) measures 2.3 x 2.2 cm, previously 2.3 x 2.3 cm. \par Segment 6 lesion (2:74) measures 1 x 1 cm, previously 1.2 x 0.9 cm. \par \par BILE DUCTS: Normal caliber. \par GALLBLADDER: Within normal limits. \par SPLEEN: Within normal limits. \par PANCREAS: Mild prominence of the pancreatic duct, unchanged. \par ADRENALS: Within normal limits. \par KIDNEYS/URETERS: Within normal limits. \par \par BLADDER: Within normal limits. \par REPRODUCTIVE ORGANS: Enlarged myomatous uterus. \par \par BOWEL: No bowel obstruction. Small bowel anastomosis is again noted. \par PERITONEUM: No ascites. \par VESSELS: Within normal limits. \par RETROPERITONEUM: No lymphadenopathy. \par ABDOMINAL WALL: Small umbilical hernia containing fat and a portion of \par nonobstructed bowel. Subcutaneous nodules are again noted in the gluteal \par soft tissues with new nodules noted bilaterally. \par BONES: Within normal limits. \par \par IMPRESSION: Liver metastases, stable or minimally decreased in size since \par 9/22/2018. \par \par Subcutaneous nodules, with new nodules noted bilaterally. Correlation with \par any history of subcutaneous injections is recommended. \par \par Stable appearance of the chest. \par

## 2019-02-27 ENCOUNTER — OUTPATIENT (OUTPATIENT)
Dept: OUTPATIENT SERVICES | Facility: HOSPITAL | Age: 49
LOS: 1 days | Discharge: HOME | End: 2019-02-27

## 2019-02-27 DIAGNOSIS — D3A.8 OTHER BENIGN NEUROENDOCRINE TUMORS: ICD-10-CM

## 2019-02-27 DIAGNOSIS — Z98.89 OTHER SPECIFIED POSTPROCEDURAL STATES: Chronic | ICD-10-CM

## 2019-03-18 ENCOUNTER — APPOINTMENT (OUTPATIENT)
Dept: INFUSION THERAPY | Facility: HOSPITAL | Age: 49
End: 2019-03-18

## 2019-04-15 ENCOUNTER — OUTPATIENT (OUTPATIENT)
Dept: OUTPATIENT SERVICES | Facility: HOSPITAL | Age: 49
LOS: 1 days | Discharge: HOME | End: 2019-04-15

## 2019-04-15 DIAGNOSIS — D3A.8 OTHER BENIGN NEUROENDOCRINE TUMORS: ICD-10-CM

## 2019-04-15 DIAGNOSIS — Z98.89 OTHER SPECIFIED POSTPROCEDURAL STATES: Chronic | ICD-10-CM

## 2019-04-17 ENCOUNTER — APPOINTMENT (OUTPATIENT)
Dept: INFUSION THERAPY | Facility: HOSPITAL | Age: 49
End: 2019-04-17

## 2019-05-16 ENCOUNTER — OUTPATIENT (OUTPATIENT)
Dept: OUTPATIENT SERVICES | Facility: HOSPITAL | Age: 49
LOS: 1 days | Discharge: HOME | End: 2019-05-16

## 2019-05-16 DIAGNOSIS — D3A.8 OTHER BENIGN NEUROENDOCRINE TUMORS: ICD-10-CM

## 2019-05-16 DIAGNOSIS — Z98.89 OTHER SPECIFIED POSTPROCEDURAL STATES: Chronic | ICD-10-CM

## 2019-05-20 ENCOUNTER — APPOINTMENT (OUTPATIENT)
Dept: INFUSION THERAPY | Facility: HOSPITAL | Age: 49
End: 2019-05-20

## 2019-06-12 ENCOUNTER — APPOINTMENT (OUTPATIENT)
Dept: HEMATOLOGY ONCOLOGY | Facility: CLINIC | Age: 49
End: 2019-06-12
Payer: COMMERCIAL

## 2019-06-12 VITALS
BODY MASS INDEX: 24.19 KG/M2 | RESPIRATION RATE: 16 BRPM | HEART RATE: 66 BPM | TEMPERATURE: 98.7 F | SYSTOLIC BLOOD PRESSURE: 120 MMHG | OXYGEN SATURATION: 99 % | WEIGHT: 132.28 LBS | DIASTOLIC BLOOD PRESSURE: 70 MMHG

## 2019-06-12 PROCEDURE — 99214 OFFICE O/P EST MOD 30 MIN: CPT

## 2019-06-12 NOTE — PHYSICAL EXAM
[Fully active, able to carry on all pre-disease performance without restriction] : Status 0 - Fully active, able to carry on all pre-disease performance without restriction [Normal] : affect appropriate [de-identified] :   well healed incisions

## 2019-06-12 NOTE — HISTORY OF PRESENT ILLNESS
[Disease: _____________________] : Disease: [unfilled] [T: ___] : T[unfilled] [N: ___] : N[unfilled] [M: ___] : M[unfilled] [AJCC Stage: ____] : AJCC Stage: [unfilled] [de-identified] : chromogranin and serotonin [de-identified] : Ms. Franklin was a new set of health still December of 2013 when she noticed some vague abdominal pain at which time she presented to the emergency room was found to have a mesenteric mass measuring 4.2 cm in greatest dimension by the aortic bifurcation. She eventually underwent a laparoscopic biopsy by Dr. Dennis Singh is found to be consistent with a neuroendocrine tumor. Patient was eventually seen by a gastroenterologist Dr. Adi Patel who performed an upper endoscopy and colonoscopy that was unremarkable and was socially followed by a capsule study that revealed a mass in the jejunum patient was subsequently taken to the OR by Dr. 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  since there is no evidence of disease at that time and no evidence of metastatic disease after discussion with the patient and her presentation at tumor patient was placed on surveillance therapy off treatment.\par \par In November 2017 found to have metastatic disease and started on lanreotide [de-identified] : Amairani comes in for follow up while on Lanreotide and feels well overall.  She denies any diarrhea, SOB or pain.  She has not yet had her Echocardiogram.  She continues to work without issues. Patient still has not had consultation with IR to discuss liver directed therapy (she requested). \par \par \par     [FreeTextEntry1] : Lanreotide

## 2019-06-12 NOTE — REVIEW OF SYSTEMS
[Negative] : Allergic/Immunologic [Recent Change In Weight] : ~T recent weight change [Lower Ext Edema] : no lower extremity edema [Chest Pain] : no chest pain [Fatigue] : no fatigue [Shortness Of Breath] : no shortness of breath [Cough] : no cough [Abdominal Pain] : no abdominal pain [SOB on Exertion] : no shortness of breath during exertion [Diarrhea] : no diarrhea [Constipation] : no constipation

## 2019-06-14 ENCOUNTER — OUTPATIENT (OUTPATIENT)
Dept: OUTPATIENT SERVICES | Facility: HOSPITAL | Age: 49
LOS: 1 days | Discharge: HOME | End: 2019-06-14

## 2019-06-14 DIAGNOSIS — D3A.8 OTHER BENIGN NEUROENDOCRINE TUMORS: ICD-10-CM

## 2019-06-14 DIAGNOSIS — Z98.89 OTHER SPECIFIED POSTPROCEDURAL STATES: Chronic | ICD-10-CM

## 2019-06-20 ENCOUNTER — RESULT REVIEW (OUTPATIENT)
Age: 49
End: 2019-06-20

## 2019-06-20 ENCOUNTER — APPOINTMENT (OUTPATIENT)
Dept: INFUSION THERAPY | Facility: HOSPITAL | Age: 49
End: 2019-06-20

## 2019-06-20 LAB
BASOPHILS # BLD AUTO: 0 K/UL — SIGNIFICANT CHANGE UP (ref 0–0.2)
BASOPHILS NFR BLD AUTO: 0.4 % — SIGNIFICANT CHANGE UP (ref 0–2)
EOSINOPHIL # BLD AUTO: 0.2 K/UL — SIGNIFICANT CHANGE UP (ref 0–0.5)
EOSINOPHIL NFR BLD AUTO: 2.5 % — SIGNIFICANT CHANGE UP (ref 0–6)
HCT VFR BLD CALC: 36.9 % — SIGNIFICANT CHANGE UP (ref 34.5–45)
HGB BLD-MCNC: 12.9 G/DL — SIGNIFICANT CHANGE UP (ref 11.5–15.5)
LYMPHOCYTES # BLD AUTO: 2.2 K/UL — SIGNIFICANT CHANGE UP (ref 1–3.3)
LYMPHOCYTES # BLD AUTO: 25.1 % — SIGNIFICANT CHANGE UP (ref 13–44)
MCHC RBC-ENTMCNC: 25.8 PG — LOW (ref 27–34)
MCHC RBC-ENTMCNC: 35 G/DL — SIGNIFICANT CHANGE UP (ref 32–36)
MCV RBC AUTO: 73.6 FL — LOW (ref 80–100)
MONOCYTES # BLD AUTO: 0.5 K/UL — SIGNIFICANT CHANGE UP (ref 0–0.9)
MONOCYTES NFR BLD AUTO: 6.1 % — SIGNIFICANT CHANGE UP (ref 2–14)
NEUTROPHILS # BLD AUTO: 5.8 K/UL — SIGNIFICANT CHANGE UP (ref 1.8–7.4)
NEUTROPHILS NFR BLD AUTO: 65.9 % — SIGNIFICANT CHANGE UP (ref 43–77)
PLATELET # BLD AUTO: 449 K/UL — HIGH (ref 150–400)
RBC # BLD: 5.01 M/UL — SIGNIFICANT CHANGE UP (ref 3.8–5.2)
RBC # FLD: 15.7 % — HIGH (ref 10.3–14.5)
WBC # BLD: 8.8 K/UL — SIGNIFICANT CHANGE UP (ref 3.8–10.5)
WBC # FLD AUTO: 8.8 K/UL — SIGNIFICANT CHANGE UP (ref 3.8–10.5)

## 2019-06-21 LAB
ALBUMIN SERPL ELPH-MCNC: 4.5 G/DL
ALP BLD-CCNC: 71 U/L
ALT SERPL-CCNC: 14 U/L
ANION GAP SERPL CALC-SCNC: 11 MMOL/L
AST SERPL-CCNC: 19 U/L
BILIRUB SERPL-MCNC: 0.4 MG/DL
BUN SERPL-MCNC: 11 MG/DL
CALCIUM SERPL-MCNC: 9.8 MG/DL
CHLORIDE SERPL-SCNC: 100 MMOL/L
CO2 SERPL-SCNC: 28 MMOL/L
CREAT SERPL-MCNC: 0.76 MG/DL
GLUCOSE SERPL-MCNC: 80 MG/DL
POTASSIUM SERPL-SCNC: 4.4 MMOL/L
PROT SERPL-MCNC: 7.3 G/DL
SODIUM SERPL-SCNC: 139 MMOL/L

## 2019-06-24 LAB
CGA SERPL-MCNC: 263 NG/ML
SEROTONIN SERUM: 817 NG/ML

## 2019-07-12 ENCOUNTER — FORM ENCOUNTER (OUTPATIENT)
Age: 49
End: 2019-07-12

## 2019-07-13 ENCOUNTER — OUTPATIENT (OUTPATIENT)
Dept: OUTPATIENT SERVICES | Facility: HOSPITAL | Age: 49
LOS: 1 days | End: 2019-07-13
Payer: COMMERCIAL

## 2019-07-13 ENCOUNTER — APPOINTMENT (OUTPATIENT)
Dept: CT IMAGING | Facility: IMAGING CENTER | Age: 49
End: 2019-07-13
Payer: COMMERCIAL

## 2019-07-13 DIAGNOSIS — Z98.89 OTHER SPECIFIED POSTPROCEDURAL STATES: Chronic | ICD-10-CM

## 2019-07-13 DIAGNOSIS — D3A.8 OTHER BENIGN NEUROENDOCRINE TUMORS: ICD-10-CM

## 2019-07-13 PROCEDURE — 71260 CT THORAX DX C+: CPT

## 2019-07-13 PROCEDURE — 74177 CT ABD & PELVIS W/CONTRAST: CPT

## 2019-07-13 PROCEDURE — 74177 CT ABD & PELVIS W/CONTRAST: CPT | Mod: 26

## 2019-07-13 PROCEDURE — 71260 CT THORAX DX C+: CPT | Mod: 26

## 2019-07-15 ENCOUNTER — OUTPATIENT (OUTPATIENT)
Dept: OUTPATIENT SERVICES | Facility: HOSPITAL | Age: 49
LOS: 1 days | Discharge: HOME | End: 2019-07-15

## 2019-07-15 DIAGNOSIS — D3A.8 OTHER BENIGN NEUROENDOCRINE TUMORS: ICD-10-CM

## 2019-07-15 DIAGNOSIS — Z98.89 OTHER SPECIFIED POSTPROCEDURAL STATES: Chronic | ICD-10-CM

## 2019-07-18 ENCOUNTER — APPOINTMENT (OUTPATIENT)
Dept: INFUSION THERAPY | Facility: HOSPITAL | Age: 49
End: 2019-07-18

## 2019-08-15 ENCOUNTER — OUTPATIENT (OUTPATIENT)
Dept: OUTPATIENT SERVICES | Facility: HOSPITAL | Age: 49
LOS: 1 days | Discharge: HOME | End: 2019-08-15

## 2019-08-15 DIAGNOSIS — Z98.89 OTHER SPECIFIED POSTPROCEDURAL STATES: Chronic | ICD-10-CM

## 2019-08-15 DIAGNOSIS — D3A.8 OTHER BENIGN NEUROENDOCRINE TUMORS: ICD-10-CM

## 2019-08-19 ENCOUNTER — APPOINTMENT (OUTPATIENT)
Dept: INFUSION THERAPY | Facility: HOSPITAL | Age: 49
End: 2019-08-19

## 2019-09-21 NOTE — PROGRESS NOTE ADULT - PROVIDER SPECIALTY LIST ADULT
Anesthesia
Anesthesia
Pain Medicine
Pt evaluated and discharged by provider prior to nursing assessment       Sean Tran RN  09/21/19 [id number]
Surgery

## 2019-10-22 ENCOUNTER — OUTPATIENT (OUTPATIENT)
Dept: OUTPATIENT SERVICES | Facility: HOSPITAL | Age: 49
LOS: 1 days | Discharge: HOME | End: 2019-10-22

## 2019-10-22 DIAGNOSIS — D3A.8 OTHER BENIGN NEUROENDOCRINE TUMORS: ICD-10-CM

## 2019-10-22 DIAGNOSIS — Z98.89 OTHER SPECIFIED POSTPROCEDURAL STATES: Chronic | ICD-10-CM

## 2019-11-18 ENCOUNTER — APPOINTMENT (OUTPATIENT)
Dept: HEMATOLOGY ONCOLOGY | Facility: CLINIC | Age: 49
End: 2019-11-18
Payer: COMMERCIAL

## 2019-11-18 VITALS
OXYGEN SATURATION: 100 % | DIASTOLIC BLOOD PRESSURE: 57 MMHG | RESPIRATION RATE: 18 BRPM | BODY MASS INDEX: 22.58 KG/M2 | TEMPERATURE: 98.1 F | WEIGHT: 123.46 LBS | SYSTOLIC BLOOD PRESSURE: 109 MMHG | HEART RATE: 67 BPM

## 2019-11-18 DIAGNOSIS — D50.9 IRON DEFICIENCY ANEMIA, UNSPECIFIED: ICD-10-CM

## 2019-11-18 PROCEDURE — 99215 OFFICE O/P EST HI 40 MIN: CPT

## 2019-11-18 NOTE — DISCUSSION/SUMMARY
[FreeTextEntry1] : called from treatment room that patient showed up for her injection, prior to to this multiple calls were made to patient by my  that she needs to schedule follow up to go over her scans but patient was not able to provide a time or date when she could come, she was informed that without appropriate follow up we can just have her come for treatment. I was called by Nurse Cortez that we can't treat today because of the above and I spoke to the patient over the phone as well informing her that  she needs to be seen in the office  to go over her scans and decide if continue treatment is appropriate.  She stated she will have to think about it.

## 2019-11-18 NOTE — ASSESSMENT
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 CT scans in July showed stable disease. I have reviewed all the scans with the patient. She has metastatic disease, was discussed with the importance of compliance with her treatment. \par \par Plan\par contact Dr. Armenta for potential IR liver directed therapy \par restart lanreotide monthly asap\par get the scans from [place]\par check labs today\par RTC two months

## 2019-11-18 NOTE — REVIEW OF SYSTEMS
[Recent Change In Weight] : ~T recent weight change [Negative] : Allergic/Immunologic [Fatigue] : no fatigue [Chest Pain] : no chest pain [Lower Ext Edema] : no lower extremity edema [Cough] : no cough [Shortness Of Breath] : no shortness of breath [Abdominal Pain] : no abdominal pain [SOB on Exertion] : no shortness of breath during exertion [Diarrhea] : no diarrhea [Constipation] : no constipation

## 2019-11-18 NOTE — PHYSICAL EXAM
[Fully active, able to carry on all pre-disease performance without restriction] : Status 0 - Fully active, able to carry on all pre-disease performance without restriction [Normal] : affect appropriate [de-identified] :   well healed incisions

## 2019-11-19 ENCOUNTER — RESULT REVIEW (OUTPATIENT)
Age: 49
End: 2019-11-19

## 2019-11-19 ENCOUNTER — APPOINTMENT (OUTPATIENT)
Dept: INFUSION THERAPY | Facility: HOSPITAL | Age: 49
End: 2019-11-19

## 2019-11-19 LAB
BASOPHILS # BLD AUTO: 0.1 K/UL — SIGNIFICANT CHANGE UP (ref 0–0.2)
EOSINOPHIL # BLD AUTO: 0.6 K/UL — HIGH (ref 0–0.5)
EOSINOPHIL NFR BLD AUTO: 6 % — SIGNIFICANT CHANGE UP (ref 0–6)
HCT VFR BLD CALC: 36.2 % — SIGNIFICANT CHANGE UP (ref 34.5–45)
HGB BLD-MCNC: 12.6 G/DL — SIGNIFICANT CHANGE UP (ref 11.5–15.5)
LYMPHOCYTES # BLD AUTO: 2.3 K/UL — SIGNIFICANT CHANGE UP (ref 1–3.3)
LYMPHOCYTES # BLD AUTO: 31 % — SIGNIFICANT CHANGE UP (ref 13–44)
MCHC RBC-ENTMCNC: 26.7 PG — LOW (ref 27–34)
MCHC RBC-ENTMCNC: 34.7 G/DL — SIGNIFICANT CHANGE UP (ref 32–36)
MCV RBC AUTO: 77 FL — LOW (ref 80–100)
MONOCYTES # BLD AUTO: 0.6 K/UL — SIGNIFICANT CHANGE UP (ref 0–0.9)
MONOCYTES NFR BLD AUTO: 5 % — SIGNIFICANT CHANGE UP (ref 2–14)
NEUTROPHILS # BLD AUTO: 5.5 K/UL — SIGNIFICANT CHANGE UP (ref 1.8–7.4)
NEUTROPHILS NFR BLD AUTO: 58 % — SIGNIFICANT CHANGE UP (ref 43–77)
PLAT MORPH BLD: NORMAL — SIGNIFICANT CHANGE UP
PLATELET # BLD AUTO: 370 K/UL — SIGNIFICANT CHANGE UP (ref 150–400)
RBC # BLD: 4.71 M/UL — SIGNIFICANT CHANGE UP (ref 3.8–5.2)
RBC # FLD: 14.8 % — HIGH (ref 10.3–14.5)
RBC BLD AUTO: SIGNIFICANT CHANGE UP
WBC # BLD: 9.6 K/UL — SIGNIFICANT CHANGE UP (ref 3.8–10.5)
WBC # FLD AUTO: 9.6 K/UL — SIGNIFICANT CHANGE UP (ref 3.8–10.5)

## 2019-11-27 ENCOUNTER — APPOINTMENT (OUTPATIENT)
Dept: INTERVENTIONAL RADIOLOGY/VASCULAR | Facility: CLINIC | Age: 49
End: 2019-11-27
Payer: COMMERCIAL

## 2019-11-27 VITALS
SYSTOLIC BLOOD PRESSURE: 116 MMHG | HEIGHT: 62 IN | RESPIRATION RATE: 16 BRPM | TEMPERATURE: 97.6 F | BODY MASS INDEX: 22.63 KG/M2 | OXYGEN SATURATION: 100 % | WEIGHT: 123 LBS | HEART RATE: 67 BPM | DIASTOLIC BLOOD PRESSURE: 75 MMHG

## 2019-11-27 PROCEDURE — 99244 OFF/OP CNSLTJ NEW/EST MOD 40: CPT

## 2019-11-27 RX ORDER — LANREOTIDE ACETATE 120 MG/.5ML
INJECTION SUBCUTANEOUS
Refills: 0 | Status: ACTIVE | COMMUNITY

## 2019-12-08 ENCOUNTER — OUTPATIENT (OUTPATIENT)
Dept: OUTPATIENT SERVICES | Facility: HOSPITAL | Age: 49
LOS: 1 days | Discharge: HOME | End: 2019-12-08

## 2019-12-08 DIAGNOSIS — Z98.89 OTHER SPECIFIED POSTPROCEDURAL STATES: Chronic | ICD-10-CM

## 2019-12-08 DIAGNOSIS — D3A.8 OTHER BENIGN NEUROENDOCRINE TUMORS: ICD-10-CM

## 2019-12-10 LAB
CGA SERPL-MCNC: 297 NG/ML
SEROTONIN BLD-MCNC: 1870 NG/ML

## 2019-12-16 ENCOUNTER — APPOINTMENT (OUTPATIENT)
Dept: INFUSION THERAPY | Facility: HOSPITAL | Age: 49
End: 2019-12-16

## 2020-01-13 ENCOUNTER — OUTPATIENT (OUTPATIENT)
Dept: OUTPATIENT SERVICES | Facility: HOSPITAL | Age: 50
LOS: 1 days | Discharge: HOME | End: 2020-01-13

## 2020-01-13 DIAGNOSIS — Z98.89 OTHER SPECIFIED POSTPROCEDURAL STATES: Chronic | ICD-10-CM

## 2020-01-13 DIAGNOSIS — D3A.8 OTHER BENIGN NEUROENDOCRINE TUMORS: ICD-10-CM

## 2020-01-17 ENCOUNTER — APPOINTMENT (OUTPATIENT)
Dept: INFUSION THERAPY | Facility: HOSPITAL | Age: 50
End: 2020-01-17

## 2020-01-17 ENCOUNTER — RESULT REVIEW (OUTPATIENT)
Age: 50
End: 2020-01-17

## 2020-01-17 ENCOUNTER — APPOINTMENT (OUTPATIENT)
Dept: HEMATOLOGY ONCOLOGY | Facility: CLINIC | Age: 50
End: 2020-01-17
Payer: COMMERCIAL

## 2020-01-17 VITALS
BODY MASS INDEX: 22.62 KG/M2 | SYSTOLIC BLOOD PRESSURE: 115 MMHG | OXYGEN SATURATION: 100 % | RESPIRATION RATE: 16 BRPM | HEART RATE: 71 BPM | WEIGHT: 123.68 LBS | DIASTOLIC BLOOD PRESSURE: 75 MMHG | TEMPERATURE: 97.6 F

## 2020-01-17 DIAGNOSIS — Z85.05 PERSONAL HISTORY OF MALIGNANT NEOPLASM OF LIVER: ICD-10-CM

## 2020-01-17 LAB
BASOPHILS # BLD AUTO: 0.1 K/UL — SIGNIFICANT CHANGE UP (ref 0–0.2)
BASOPHILS NFR BLD AUTO: 1.3 % — SIGNIFICANT CHANGE UP (ref 0–2)
EOSINOPHIL # BLD AUTO: 0.7 K/UL — HIGH (ref 0–0.5)
EOSINOPHIL NFR BLD AUTO: 6.9 % — HIGH (ref 0–6)
HCT VFR BLD CALC: 37.5 % — SIGNIFICANT CHANGE UP (ref 34.5–45)
HGB BLD-MCNC: 13 G/DL — SIGNIFICANT CHANGE UP (ref 11.5–15.5)
LYMPHOCYTES # BLD AUTO: 1.9 K/UL — SIGNIFICANT CHANGE UP (ref 1–3.3)
LYMPHOCYTES # BLD AUTO: 20.1 % — SIGNIFICANT CHANGE UP (ref 13–44)
MCHC RBC-ENTMCNC: 26.6 PG — LOW (ref 27–34)
MCHC RBC-ENTMCNC: 34.7 G/DL — SIGNIFICANT CHANGE UP (ref 32–36)
MCV RBC AUTO: 76.5 FL — LOW (ref 80–100)
MONOCYTES # BLD AUTO: 0.6 K/UL — SIGNIFICANT CHANGE UP (ref 0–0.9)
MONOCYTES NFR BLD AUTO: 6.6 % — SIGNIFICANT CHANGE UP (ref 2–14)
NEUTROPHILS # BLD AUTO: 6.3 K/UL — SIGNIFICANT CHANGE UP (ref 1.8–7.4)
NEUTROPHILS NFR BLD AUTO: 65.2 % — SIGNIFICANT CHANGE UP (ref 43–77)
PLATELET # BLD AUTO: 393 K/UL — SIGNIFICANT CHANGE UP (ref 150–400)
RBC # BLD: 4.9 M/UL — SIGNIFICANT CHANGE UP (ref 3.8–5.2)
RBC # FLD: 14.4 % — SIGNIFICANT CHANGE UP (ref 10.3–14.5)
WBC # BLD: 9.7 K/UL — SIGNIFICANT CHANGE UP (ref 3.8–10.5)
WBC # FLD AUTO: 9.7 K/UL — SIGNIFICANT CHANGE UP (ref 3.8–10.5)

## 2020-01-17 PROCEDURE — 99213 OFFICE O/P EST LOW 20 MIN: CPT

## 2020-01-17 NOTE — HISTORY OF PRESENT ILLNESS
[Disease: _____________________] : Disease: [unfilled] [T: ___] : T[unfilled] [M: ___] : M[unfilled] [N: ___] : N[unfilled] [AJCC Stage: ____] : AJCC Stage: [unfilled] [de-identified] : chromogranin and serotonin [FreeTextEntry1] : Lanreotide  [de-identified] : Ms. Franklin was a new set of health still December of 2013 when she noticed some vague abdominal pain at which time she presented to the emergency room was found to have a mesenteric mass measuring 4.2 cm in greatest dimension by the aortic bifurcation. She eventually underwent a laparoscopic biopsy by Dr. Dennis Singh is found to be consistent with a neuroendocrine tumor. Patient was eventually seen by a gastroenterologist Dr. Adi Patel who performed an upper endoscopy and colonoscopy that was unremarkable and was socially followed by a capsule study that revealed a mass in the jejunum patient was subsequently taken to the OR by Dr. 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  since there is no evidence of disease at that time and no evidence of metastatic disease after discussion with the patient and her presentation at tumor patient was placed on surveillance therapy off treatment.\par \par In November 2017 found to have metastatic disease and started on lanreotide [de-identified] : July 2019 CT c/a/p showed liver metastases, unchanged to slightly increased in size compared with the prior study. 4 mm left lower lobe groundglass pulmonary nodule, unchanged dating back to the 2014 exam, most consistent with a benign etiology. \par \par In the meantime she was evaluated by Dr. Lilly at [place] for liver transplantation. The patient has been noncompliant with her lanreotide treatment. The last dose was in July, 2019, missed June dose. Amairani comes in for follow up while on Lanreotide and feels well overall.  She denies any abdominal pain, diarrhea. She continues to work without issues. Patient still has not had consultation with IR to discuss liver directed therapy (she requested). \par \par 1/17/2020 she feels fine, works without any issues. She denies any abdominal pain, diarrhea.  She was seen by Dr. Armenta for liver-directed therapy, has not decided when to receive the treatment. \par \par \par

## 2020-01-17 NOTE — PHYSICAL EXAM
[Fully active, able to carry on all pre-disease performance without restriction] : Status 0 - Fully active, able to carry on all pre-disease performance without restriction [Normal] : affect appropriate [de-identified] :   well healed incisions

## 2020-01-17 NOTE — REVIEW OF SYSTEMS
[Recent Change In Weight] : ~T recent weight change [Negative] : Heme/Lymph [Fatigue] : no fatigue [Chest Pain] : no chest pain [Lower Ext Edema] : no lower extremity edema [SOB on Exertion] : no shortness of breath during exertion [Shortness Of Breath] : no shortness of breath [Cough] : no cough [Abdominal Pain] : no abdominal pain [Diarrhea] : no diarrhea [Constipation] : no constipation

## 2020-01-17 NOTE — ASSESSMENT
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 CT scans in July showed stable disease. I have reviewed all the scans with the patient. She has metastatic disease, was discussed with the importance of compliance with her treatment. She feels fine, denies abdominal cramps, diarrhea. \par \par Plan\par contact Dr. Armenta for potential IR liver directed therapy \par continue lanreotide monthly asap\par get the scans from [place]\par check labs today\par RTC two months

## 2020-01-18 LAB
ALBUMIN SERPL ELPH-MCNC: 4.6 G/DL
ALP BLD-CCNC: 64 U/L
ALT SERPL-CCNC: 11 U/L
ANION GAP SERPL CALC-SCNC: 11 MMOL/L
AST SERPL-CCNC: 17 U/L
BILIRUB SERPL-MCNC: 0.3 MG/DL
BUN SERPL-MCNC: 11 MG/DL
CALCIUM SERPL-MCNC: 9.9 MG/DL
CEA SERPL-MCNC: 7.1 NG/ML
CHLORIDE SERPL-SCNC: 99 MMOL/L
CO2 SERPL-SCNC: 28 MMOL/L
CREAT SERPL-MCNC: 0.8 MG/DL
POTASSIUM SERPL-SCNC: 4.7 MMOL/L
PROT SERPL-MCNC: 7.2 G/DL
SODIUM SERPL-SCNC: 138 MMOL/L

## 2020-01-19 LAB
ALBUMIN SERPL ELPH-MCNC: 4.6 G/DL
ALP BLD-CCNC: 64 U/L
ALT SERPL-CCNC: 11 U/L
ANION GAP SERPL CALC-SCNC: 11 MMOL/L
AST SERPL-CCNC: 17 U/L
BILIRUB SERPL-MCNC: 0.3 MG/DL
BUN SERPL-MCNC: 11 MG/DL
CALCIUM SERPL-MCNC: 9.9 MG/DL
CHLORIDE SERPL-SCNC: 99 MMOL/L
CO2 SERPL-SCNC: 28 MMOL/L
CREAT SERPL-MCNC: 0.8 MG/DL
GLUCOSE SERPL-MCNC: 93 MG/DL
POTASSIUM SERPL-SCNC: 4.7 MMOL/L
PROT SERPL-MCNC: 7.2 G/DL
SODIUM SERPL-SCNC: 138 MMOL/L

## 2020-01-28 LAB
CGA SERPL-MCNC: 334 NG/ML
SEROTONIN BLD-MCNC: 1880 NG/ML

## 2020-02-12 ENCOUNTER — OUTPATIENT (OUTPATIENT)
Dept: OUTPATIENT SERVICES | Facility: HOSPITAL | Age: 50
LOS: 1 days | Discharge: HOME | End: 2020-02-12

## 2020-02-12 DIAGNOSIS — Z98.89 OTHER SPECIFIED POSTPROCEDURAL STATES: Chronic | ICD-10-CM

## 2020-02-12 DIAGNOSIS — D3A.8 OTHER BENIGN NEUROENDOCRINE TUMORS: ICD-10-CM

## 2020-02-18 ENCOUNTER — APPOINTMENT (OUTPATIENT)
Dept: INFUSION THERAPY | Facility: HOSPITAL | Age: 50
End: 2020-02-18

## 2020-03-14 ENCOUNTER — OUTPATIENT (OUTPATIENT)
Dept: OUTPATIENT SERVICES | Facility: HOSPITAL | Age: 50
LOS: 1 days | Discharge: HOME | End: 2020-03-14

## 2020-03-14 DIAGNOSIS — Z98.89 OTHER SPECIFIED POSTPROCEDURAL STATES: Chronic | ICD-10-CM

## 2020-03-14 DIAGNOSIS — D3A.8 OTHER BENIGN NEUROENDOCRINE TUMORS: ICD-10-CM

## 2020-03-19 ENCOUNTER — APPOINTMENT (OUTPATIENT)
Dept: INFUSION THERAPY | Facility: HOSPITAL | Age: 50
End: 2020-03-19

## 2020-04-01 ENCOUNTER — APPOINTMENT (OUTPATIENT)
Dept: HEMATOLOGY ONCOLOGY | Facility: CLINIC | Age: 50
End: 2020-04-01

## 2020-04-20 ENCOUNTER — OUTPATIENT (OUTPATIENT)
Dept: OUTPATIENT SERVICES | Facility: HOSPITAL | Age: 50
LOS: 1 days | Discharge: HOME | End: 2020-04-20

## 2020-04-20 DIAGNOSIS — D3A.8 OTHER BENIGN NEUROENDOCRINE TUMORS: ICD-10-CM

## 2020-04-20 DIAGNOSIS — Z98.89 OTHER SPECIFIED POSTPROCEDURAL STATES: Chronic | ICD-10-CM

## 2020-04-21 ENCOUNTER — RESULT REVIEW (OUTPATIENT)
Age: 50
End: 2020-04-21

## 2020-04-21 ENCOUNTER — APPOINTMENT (OUTPATIENT)
Dept: INFUSION THERAPY | Facility: HOSPITAL | Age: 50
End: 2020-04-21

## 2020-04-21 LAB
BASOPHILS # BLD AUTO: 0.06 K/UL — SIGNIFICANT CHANGE UP (ref 0–0.2)
BASOPHILS NFR BLD AUTO: 0.7 % — SIGNIFICANT CHANGE UP (ref 0–2)
EOSINOPHIL # BLD AUTO: 0.44 K/UL — SIGNIFICANT CHANGE UP (ref 0–0.5)
EOSINOPHIL NFR BLD AUTO: 5.1 % — SIGNIFICANT CHANGE UP (ref 0–6)
HCT VFR BLD CALC: 36.6 % — SIGNIFICANT CHANGE UP (ref 34.5–45)
HGB BLD-MCNC: 12.1 G/DL — SIGNIFICANT CHANGE UP (ref 11.5–15.5)
IMM GRANULOCYTES NFR BLD AUTO: 0.3 % — SIGNIFICANT CHANGE UP (ref 0–1.5)
LYMPHOCYTES # BLD AUTO: 2.42 K/UL — SIGNIFICANT CHANGE UP (ref 1–3.3)
LYMPHOCYTES # BLD AUTO: 28.1 % — SIGNIFICANT CHANGE UP (ref 13–44)
MCHC RBC-ENTMCNC: 24.4 PG — LOW (ref 27–34)
MCHC RBC-ENTMCNC: 33.1 GM/DL — SIGNIFICANT CHANGE UP (ref 32–36)
MCV RBC AUTO: 73.9 FL — LOW (ref 80–100)
MONOCYTES # BLD AUTO: 0.52 K/UL — SIGNIFICANT CHANGE UP (ref 0–0.9)
MONOCYTES NFR BLD AUTO: 6 % — SIGNIFICANT CHANGE UP (ref 2–14)
NEUTROPHILS # BLD AUTO: 5.15 K/UL — SIGNIFICANT CHANGE UP (ref 1.8–7.4)
NEUTROPHILS NFR BLD AUTO: 59.8 % — SIGNIFICANT CHANGE UP (ref 43–77)
NRBC # BLD: 0 /100 WBCS — SIGNIFICANT CHANGE UP (ref 0–0)
PLATELET # BLD AUTO: 597 K/UL — HIGH (ref 150–400)
RBC # BLD: 4.95 M/UL — SIGNIFICANT CHANGE UP (ref 3.8–5.2)
RBC # FLD: 15.9 % — HIGH (ref 10.3–14.5)
WBC # BLD: 8.62 K/UL — SIGNIFICANT CHANGE UP (ref 3.8–10.5)
WBC # FLD AUTO: 8.62 K/UL — SIGNIFICANT CHANGE UP (ref 3.8–10.5)

## 2020-04-22 LAB
ALBUMIN SERPL ELPH-MCNC: 4.5 G/DL
ALP BLD-CCNC: 74 U/L
ALT SERPL-CCNC: 11 U/L
ANION GAP SERPL CALC-SCNC: 11 MMOL/L
AST SERPL-CCNC: 16 U/L
BILIRUB SERPL-MCNC: 0.2 MG/DL
BUN SERPL-MCNC: 8 MG/DL
CALCIUM SERPL-MCNC: 9.6 MG/DL
CHLORIDE SERPL-SCNC: 101 MMOL/L
CO2 SERPL-SCNC: 29 MMOL/L
CREAT SERPL-MCNC: 0.62 MG/DL
GLUCOSE SERPL-MCNC: 85 MG/DL
LDH SERPL-CCNC: 210 U/L
POTASSIUM SERPL-SCNC: 4.6 MMOL/L
PROT SERPL-MCNC: 7.3 G/DL
SODIUM SERPL-SCNC: 140 MMOL/L

## 2020-04-24 ENCOUNTER — APPOINTMENT (OUTPATIENT)
Dept: HEMATOLOGY ONCOLOGY | Facility: CLINIC | Age: 50
End: 2020-04-24
Payer: COMMERCIAL

## 2020-04-24 PROCEDURE — 99214 OFFICE O/P EST MOD 30 MIN: CPT | Mod: 95

## 2020-04-24 NOTE — HISTORY OF PRESENT ILLNESS
[Home] : at home, [unfilled] , at the time of the visit. [Medical Office: ([facility])___] : at the medical office located in  [Disease: _____________________] : Disease: [unfilled] [Patient] : the patient [Self] : self [N: ___] : N[unfilled] [T: ___] : T[unfilled] [AJCC Stage: ____] : AJCC Stage: [unfilled] [M: ___] : M[unfilled] [de-identified] : Ms. Franklin was a new set of health still December of 2013 when she noticed some vague abdominal pain at which time she presented to the emergency room was found to have a mesenteric mass measuring 4.2 cm in greatest dimension by the aortic bifurcation. She eventually underwent a laparoscopic biopsy by Dr. Dennis Singh is found to be consistent with a neuroendocrine tumor. Patient was eventually seen by a gastroenterologist Dr. Adi Patel who performed an upper endoscopy and colonoscopy that was unremarkable and was socially followed by a capsule study that revealed a mass in the jejunum patient was subsequently taken to the OR by Dr. 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  since there is no evidence of disease at that time and no evidence of metastatic disease after discussion with the patient and her presentation at tumor patient was placed on surveillance therapy off treatment.\par \par In November 2017 found to have metastatic disease and started on lanreotide [de-identified] : chromogranin and serotonin [FreeTextEntry1] : Lanreotide  [de-identified] : July 2019 CT c/a/p showed liver metastases, unchanged to slightly increased in size compared with the prior study. 4 mm left lower lobe groundglass pulmonary nodule, unchanged dating back to the 2014 exam, most consistent with a benign etiology. \par \par In the meantime she was evaluated by Dr. Lilly at [place] for liver transplantation. The patient has been noncompliant with her lanreotide treatment. The last dose was in July, 2019, missed June dose. Amairani comes in for follow up while on Lanreotide and feels well overall.  She denies any abdominal pain, diarrhea. She continues to work without issues. Patient still has not had consultation with IR to discuss liver directed therapy (she requested). \par \par 1/17/2020 she feels fine, works without any issues. She denies any abdominal pain, diarrhea.  She was seen by Dr. Armenta for liver-directed therapy, has not decided when to receive the treatment. \par \par 4/24/2020 she feels fine, works without any issues. She denies any abdominal pain, diarrhea.  She was seen by Dr. Armenta for liver-directed therapy, has not decided when to receive the treatment. \par \par \par \par

## 2020-04-24 NOTE — REVIEW OF SYSTEMS
[Recent Change In Weight] : ~T recent weight change [Negative] : Allergic/Immunologic [Fatigue] : no fatigue [Chest Pain] : no chest pain [Lower Ext Edema] : no lower extremity edema [Shortness Of Breath] : no shortness of breath [Cough] : no cough [SOB on Exertion] : no shortness of breath during exertion [Abdominal Pain] : no abdominal pain [Constipation] : no constipation [Diarrhea] : no diarrhea

## 2020-04-24 NOTE — ASSESSMENT
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 CT scans in July showed stable disease. I have reviewed all the scans with the patient. She has metastatic disease, was discussed with the importance of compliance with her treatment. She feels fine, denies abdominal cramps, diarrhea. \par \par Plan\par contact Dr. Armenta for potential IR liver directed therapy \par continue lanreotide monthly asap\par get the scans in two months\par check labs today\par RTC two months

## 2020-04-24 NOTE — PHYSICAL EXAM
[Fully active, able to carry on all pre-disease performance without restriction] : Status 0 - Fully active, able to carry on all pre-disease performance without restriction [Normal] : affect appropriate [de-identified] :   well healed incisions

## 2020-05-18 ENCOUNTER — OUTPATIENT (OUTPATIENT)
Dept: OUTPATIENT SERVICES | Facility: HOSPITAL | Age: 50
LOS: 1 days | Discharge: HOME | End: 2020-05-18

## 2020-05-18 DIAGNOSIS — D3A.8 OTHER BENIGN NEUROENDOCRINE TUMORS: ICD-10-CM

## 2020-05-18 DIAGNOSIS — Z98.89 OTHER SPECIFIED POSTPROCEDURAL STATES: Chronic | ICD-10-CM

## 2020-05-21 ENCOUNTER — APPOINTMENT (OUTPATIENT)
Dept: INFUSION THERAPY | Facility: HOSPITAL | Age: 50
End: 2020-05-21

## 2020-06-22 ENCOUNTER — OUTPATIENT (OUTPATIENT)
Dept: OUTPATIENT SERVICES | Facility: HOSPITAL | Age: 50
LOS: 1 days | Discharge: HOME | End: 2020-06-22

## 2020-06-22 ENCOUNTER — APPOINTMENT (OUTPATIENT)
Dept: INFUSION THERAPY | Facility: HOSPITAL | Age: 50
End: 2020-06-22

## 2020-06-22 DIAGNOSIS — Z98.89 OTHER SPECIFIED POSTPROCEDURAL STATES: Chronic | ICD-10-CM

## 2020-06-22 DIAGNOSIS — D3A.8 OTHER BENIGN NEUROENDOCRINE TUMORS: ICD-10-CM

## 2020-06-26 ENCOUNTER — APPOINTMENT (OUTPATIENT)
Dept: HEMATOLOGY ONCOLOGY | Facility: CLINIC | Age: 50
End: 2020-06-26
Payer: COMMERCIAL

## 2020-06-26 DIAGNOSIS — Z79.899 OTHER LONG TERM (CURRENT) DRUG THERAPY: ICD-10-CM

## 2020-06-26 PROCEDURE — 99212 OFFICE O/P EST SF 10 MIN: CPT | Mod: 95

## 2020-06-27 NOTE — ASSESSMENT
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 CT scans in July showed stable disease. I have reviewed all the scans with the patient. She has metastatic disease, was discussed with the importance of compliance with her treatment. She feels fine, denies abdominal cramps, diarrhea. \par \par Plan\par contact Dr. Armetna for potential IR liver directed therapy \par continue lanreotide monthly \par obtain CT asap to assess disease burden\par check labs this week\par RTC 3 weeks to discuss plan\par \par Treasure Green, ANP-BC

## 2020-06-27 NOTE — HISTORY OF PRESENT ILLNESS
[Disease: _____________________] : Disease: [unfilled] [T: ___] : T[unfilled] [N: ___] : N[unfilled] [M: ___] : M[unfilled] [AJCC Stage: ____] : AJCC Stage: [unfilled] [Home] : at home, [unfilled] , at the time of the visit. [Patient] : the patient [Self] : self [Medical Office: ([facility])___] : at the medical office located in  [de-identified] : Ms. Franklin was a new set of health still December of 2013 when she noticed some vague abdominal pain at which time she presented to the emergency room was found to have a mesenteric mass measuring 4.2 cm in greatest dimension by the aortic bifurcation. She eventually underwent a laparoscopic biopsy by Dr. Dennis Moya Singh is found to be consistent with a neuroendocrine tumor. Patient was eventually seen by a gastroenterologist Dr. Adi Patel who performed an upper endoscopy and colonoscopy that was unremarkable and was socially followed by a capsule study that revealed a mass in the jejunum patient was subsequently taken to the OR by Dr. 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  since there is no evidence of disease at that time and no evidence of metastatic disease after discussion with the patient and her presentation at tumor patient was placed on surveillance therapy off treatment.\par \par In November 2017 found to have metastatic disease and started on lanreotide\par \par July 2019 CT c/a/p showed liver metastases, unchanged to slightly increased in size compared with the prior study. 4 mm left lower lobe groundglass pulmonary nodule, unchanged dating back to the 2014 exam, most consistent with a benign etiology. \par \par In the meantime she was evaluated by Dr. Lilly at [place] for liver transplantation. The patient has been noncompliant with her lanreotide treatment. The last dose was in July, 2019, missed Meera dose. Amairani comes in for follow up while on Lanreotide and feels well overall.  She denies any abdominal pain, diarrhea. She continues to work without issues. Patient still has not had consultation with IR to discuss liver directed therapy (she requested). \par \par 1/17/2020 she feels fine, works without any issues. She denies any abdominal pain, diarrhea.  She was seen by Dr. Armenta for liver-directed therapy, has not decided when to receive the treatment. \par \par 4/24/2020 she feels fine, works without any issues. She denies any abdominal pain, diarrhea.  She was seen by Dr. Armenta for liver-directed therapy, has not decided when to receive the treatment.  [de-identified] : chromogranin and serotonin [de-identified] : 6/26/2020 : Ms Franklin  joined telehealth visit today. She received somatuline on 6/22/2020. No labs were obtained that day. She describes her "is moving slowly and her food seems to me slow slowly". She becker snot have NV and does not endorse pain. \par She feels well otherwise, working. \par \par \par \par \par     [FreeTextEntry1] : Lanreotide

## 2020-06-27 NOTE — REVIEW OF SYSTEMS
[Recent Change In Weight] : ~T recent weight change [Negative] : Allergic/Immunologic [Chest Pain] : no chest pain [Lower Ext Edema] : no lower extremity edema [Fatigue] : no fatigue [SOB on Exertion] : no shortness of breath during exertion [Cough] : no cough [Shortness Of Breath] : no shortness of breath [Abdominal Pain] : no abdominal pain [Diarrhea] : no diarrhea [Constipation] : no constipation [FreeTextEntry7] : "food moving slowly"

## 2020-06-29 ENCOUNTER — APPOINTMENT (OUTPATIENT)
Dept: HEMATOLOGY ONCOLOGY | Facility: CLINIC | Age: 50
End: 2020-06-29

## 2020-07-11 ENCOUNTER — RESULT REVIEW (OUTPATIENT)
Age: 50
End: 2020-07-11

## 2020-07-11 ENCOUNTER — OUTPATIENT (OUTPATIENT)
Dept: OUTPATIENT SERVICES | Facility: HOSPITAL | Age: 50
LOS: 1 days | End: 2020-07-11
Payer: COMMERCIAL

## 2020-07-11 ENCOUNTER — APPOINTMENT (OUTPATIENT)
Dept: CT IMAGING | Facility: IMAGING CENTER | Age: 50
End: 2020-07-11

## 2020-07-11 DIAGNOSIS — D3A.8 OTHER BENIGN NEUROENDOCRINE TUMORS: ICD-10-CM

## 2020-07-11 DIAGNOSIS — K63.89 OTHER SPECIFIED DISEASES OF INTESTINE: ICD-10-CM

## 2020-07-11 DIAGNOSIS — Z98.89 OTHER SPECIFIED POSTPROCEDURAL STATES: Chronic | ICD-10-CM

## 2020-07-11 PROCEDURE — 74177 CT ABD & PELVIS W/CONTRAST: CPT

## 2020-07-11 PROCEDURE — 74177 CT ABD & PELVIS W/CONTRAST: CPT | Mod: 26

## 2020-07-11 PROCEDURE — 71260 CT THORAX DX C+: CPT | Mod: 26

## 2020-07-11 PROCEDURE — 71260 CT THORAX DX C+: CPT

## 2020-07-15 ENCOUNTER — APPOINTMENT (OUTPATIENT)
Dept: HEMATOLOGY ONCOLOGY | Facility: CLINIC | Age: 50
End: 2020-07-15

## 2020-07-24 ENCOUNTER — OUTPATIENT (OUTPATIENT)
Dept: OUTPATIENT SERVICES | Facility: HOSPITAL | Age: 50
LOS: 1 days | Discharge: HOME | End: 2020-07-24

## 2020-07-24 DIAGNOSIS — D3A.8 OTHER BENIGN NEUROENDOCRINE TUMORS: ICD-10-CM

## 2020-07-24 DIAGNOSIS — Z98.89 OTHER SPECIFIED POSTPROCEDURAL STATES: Chronic | ICD-10-CM

## 2020-07-28 ENCOUNTER — APPOINTMENT (OUTPATIENT)
Dept: INFUSION THERAPY | Facility: HOSPITAL | Age: 50
End: 2020-07-28

## 2020-08-25 ENCOUNTER — OUTPATIENT (OUTPATIENT)
Dept: OUTPATIENT SERVICES | Facility: HOSPITAL | Age: 50
LOS: 1 days | Discharge: HOME | End: 2020-08-25

## 2020-08-25 DIAGNOSIS — D3A.8 OTHER BENIGN NEUROENDOCRINE TUMORS: ICD-10-CM

## 2020-08-25 DIAGNOSIS — Z98.89 OTHER SPECIFIED POSTPROCEDURAL STATES: Chronic | ICD-10-CM

## 2020-08-27 ENCOUNTER — LABORATORY RESULT (OUTPATIENT)
Age: 50
End: 2020-08-27

## 2020-08-27 ENCOUNTER — RESULT REVIEW (OUTPATIENT)
Age: 50
End: 2020-08-27

## 2020-08-27 ENCOUNTER — APPOINTMENT (OUTPATIENT)
Dept: INFUSION THERAPY | Facility: HOSPITAL | Age: 50
End: 2020-08-27

## 2020-08-27 LAB
BASOPHILS # BLD AUTO: 0.06 K/UL — SIGNIFICANT CHANGE UP (ref 0–0.2)
BASOPHILS NFR BLD AUTO: 0.6 % — SIGNIFICANT CHANGE UP (ref 0–2)
EOSINOPHIL # BLD AUTO: 0.49 K/UL — SIGNIFICANT CHANGE UP (ref 0–0.5)
EOSINOPHIL NFR BLD AUTO: 5.2 % — SIGNIFICANT CHANGE UP (ref 0–6)
HCT VFR BLD CALC: 35.9 % — SIGNIFICANT CHANGE UP (ref 34.5–45)
HGB BLD-MCNC: 12.3 G/DL — SIGNIFICANT CHANGE UP (ref 11.5–15.5)
IMM GRANULOCYTES NFR BLD AUTO: 0.3 % — SIGNIFICANT CHANGE UP (ref 0–1.5)
LYMPHOCYTES # BLD AUTO: 2.49 K/UL — SIGNIFICANT CHANGE UP (ref 1–3.3)
LYMPHOCYTES # BLD AUTO: 26.4 % — SIGNIFICANT CHANGE UP (ref 13–44)
MCHC RBC-ENTMCNC: 25.3 PG — LOW (ref 27–34)
MCHC RBC-ENTMCNC: 34.3 GM/DL — SIGNIFICANT CHANGE UP (ref 32–36)
MCV RBC AUTO: 73.9 FL — LOW (ref 80–100)
MONOCYTES # BLD AUTO: 0.6 K/UL — SIGNIFICANT CHANGE UP (ref 0–0.9)
MONOCYTES NFR BLD AUTO: 6.4 % — SIGNIFICANT CHANGE UP (ref 2–14)
NEUTROPHILS # BLD AUTO: 5.77 K/UL — SIGNIFICANT CHANGE UP (ref 1.8–7.4)
NEUTROPHILS NFR BLD AUTO: 61.1 % — SIGNIFICANT CHANGE UP (ref 43–77)
NRBC # BLD: 0 /100 WBCS — SIGNIFICANT CHANGE UP (ref 0–0)
PLATELET # BLD AUTO: 374 K/UL — SIGNIFICANT CHANGE UP (ref 150–400)
RBC # BLD: 4.86 M/UL — SIGNIFICANT CHANGE UP (ref 3.8–5.2)
RBC # FLD: 15.9 % — HIGH (ref 10.3–14.5)
WBC # BLD: 9.44 K/UL — SIGNIFICANT CHANGE UP (ref 3.8–10.5)
WBC # FLD AUTO: 9.44 K/UL — SIGNIFICANT CHANGE UP (ref 3.8–10.5)

## 2020-09-04 ENCOUNTER — APPOINTMENT (OUTPATIENT)
Dept: HEMATOLOGY ONCOLOGY | Facility: CLINIC | Age: 50
End: 2020-09-04
Payer: COMMERCIAL

## 2020-09-04 VITALS
SYSTOLIC BLOOD PRESSURE: 102 MMHG | TEMPERATURE: 98.3 F | BODY MASS INDEX: 22.78 KG/M2 | RESPIRATION RATE: 14 BRPM | DIASTOLIC BLOOD PRESSURE: 67 MMHG | HEART RATE: 73 BPM | WEIGHT: 124.56 LBS | OXYGEN SATURATION: 99 %

## 2020-09-04 PROCEDURE — 99213 OFFICE O/P EST LOW 20 MIN: CPT

## 2020-09-04 NOTE — ASSESSMENT
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 CT scans in July showed stable disease. I have reviewed all the scans with the patient. She has metastatic disease, was discussed with the importance of compliance with her treatment. She feels fine, denies abdominal cramps, diarrhea. CT scans showed slightly increasing in size for her liver lesions. \par \par Plan\par strongly recommend following  Dr. Armenta for potential IR liver directed therapy \par continue lanreotide monthly \par check labs this week\par RTC 2 months\par

## 2020-09-04 NOTE — HISTORY OF PRESENT ILLNESS
[Disease: _____________________] : Disease: [unfilled] [T: ___] : T[unfilled] [N: ___] : N[unfilled] [M: ___] : M[unfilled] [AJCC Stage: ____] : AJCC Stage: [unfilled] [de-identified] : Ms. Franklin was a new set of health still December of 2013 when she noticed some vague abdominal pain at which time she presented to the emergency room was found to have a mesenteric mass measuring 4.2 cm in greatest dimension by the aortic bifurcation. She eventually underwent a laparoscopic biopsy by Dr. Dennis Moya Singh is found to be consistent with a neuroendocrine tumor. Patient was eventually seen by a gastroenterologist Dr. Adi Patel who performed an upper endoscopy and colonoscopy that was unremarkable and was socially followed by a capsule study that revealed a mass in the jejunum patient was subsequently taken to the OR by Dr. 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  since there is no evidence of disease at that time and no evidence of metastatic disease after discussion with the patient and her presentation at tumor patient was placed on surveillance therapy off treatment.\par \par In November 2017 found to have metastatic disease and started on lanreotide\par \par July 2019 CT c/a/p showed liver metastases, unchanged to slightly increased in size compared with the prior study. 4 mm left lower lobe groundglass pulmonary nodule, unchanged dating back to the 2014 exam, most consistent with a benign etiology. \par \par In the meantime she was evaluated by Dr. Lilly at [place] for liver transplantation. The patient has been noncompliant with her lanreotide treatment. The last dose was in July, 2019, missed Meera dose. Amairani comes in for follow up while on Lanreotide and feels well overall.  She denies any abdominal pain, diarrhea. She continues to work without issues. Patient still has not had consultation with IR to discuss liver directed therapy (she requested). \par \par 1/17/2020 she feels fine, works without any issues. She denies any abdominal pain, diarrhea.  She was seen by Dr. Armenta for liver-directed therapy, has not decided when to receive the treatment. \par \par 4/24/2020 she feels fine, works without any issues. She denies any abdominal pain, diarrhea.  She was seen by Dr. Armenta for liver-directed therapy, has not decided when to receive the treatment.  [de-identified] : chromogranin and serotonin [FreeTextEntry1] : Lanreotide  [de-identified] : 6/26/2020 : Ms Franklin  joined telehealth visit today. She received somatuline on 6/22/2020. No labs were obtained that day. She describes her "is moving slowly and her food seems to me slow slowly". She becker snot have NV and does not endorse pain. \par She feels well otherwise, working.\par \par 7/11/2020 CT c/a/p showed Liver metastases, overall increased in size and number since 7/13/2019. A 0.4 cm left lower lobe groundglass nodule is unchanged.. \par \par 9/4/2020 he reports feeling overall fine, eats well, sometimes epigastric discomfort, no pain, no weight loss. \par \par \par \par \par

## 2020-09-04 NOTE — REVIEW OF SYSTEMS
[Negative] : Allergic/Immunologic [Recent Change In Weight] : ~T no recent weight change [Chest Pain] : no chest pain [Fatigue] : no fatigue [Lower Ext Edema] : no lower extremity edema [Shortness Of Breath] : no shortness of breath [Cough] : no cough [SOB on Exertion] : no shortness of breath during exertion [Abdominal Pain] : no abdominal pain [Diarrhea] : no diarrhea [Constipation] : no constipation [FreeTextEntry7] : epigastric discomfort

## 2020-09-24 ENCOUNTER — OUTPATIENT (OUTPATIENT)
Dept: OUTPATIENT SERVICES | Facility: HOSPITAL | Age: 50
LOS: 1 days | Discharge: HOME | End: 2020-09-24

## 2020-09-24 DIAGNOSIS — Z98.89 OTHER SPECIFIED POSTPROCEDURAL STATES: Chronic | ICD-10-CM

## 2020-09-24 DIAGNOSIS — D3A.8 OTHER BENIGN NEUROENDOCRINE TUMORS: ICD-10-CM

## 2020-09-28 ENCOUNTER — RESULT REVIEW (OUTPATIENT)
Age: 50
End: 2020-09-28

## 2020-09-28 ENCOUNTER — OUTPATIENT (OUTPATIENT)
Dept: OUTPATIENT SERVICES | Facility: HOSPITAL | Age: 50
LOS: 1 days | End: 2020-09-28

## 2020-09-28 ENCOUNTER — APPOINTMENT (OUTPATIENT)
Dept: INFUSION THERAPY | Facility: HOSPITAL | Age: 50
End: 2020-09-28

## 2020-09-28 DIAGNOSIS — C17.9 MALIGNANT NEOPLASM OF SMALL INTESTINE, UNSPECIFIED: ICD-10-CM

## 2020-09-28 DIAGNOSIS — Z98.89 OTHER SPECIFIED POSTPROCEDURAL STATES: Chronic | ICD-10-CM

## 2020-09-28 LAB
BASOPHILS # BLD AUTO: 0.07 K/UL — SIGNIFICANT CHANGE UP (ref 0–0.2)
BASOPHILS NFR BLD AUTO: 0.7 % — SIGNIFICANT CHANGE UP (ref 0–2)
EOSINOPHIL # BLD AUTO: 0.51 K/UL — HIGH (ref 0–0.5)
EOSINOPHIL NFR BLD AUTO: 4.9 % — SIGNIFICANT CHANGE UP (ref 0–6)
HCT VFR BLD CALC: 34.9 % — SIGNIFICANT CHANGE UP (ref 34.5–45)
HGB BLD-MCNC: 12 G/DL — SIGNIFICANT CHANGE UP (ref 11.5–15.5)
IMM GRANULOCYTES NFR BLD AUTO: 0.3 % — SIGNIFICANT CHANGE UP (ref 0–1.5)
LYMPHOCYTES # BLD AUTO: 2.67 K/UL — SIGNIFICANT CHANGE UP (ref 1–3.3)
LYMPHOCYTES # BLD AUTO: 25.7 % — SIGNIFICANT CHANGE UP (ref 13–44)
MCHC RBC-ENTMCNC: 25.7 PG — LOW (ref 27–34)
MCHC RBC-ENTMCNC: 34.4 G/DL — SIGNIFICANT CHANGE UP (ref 32–36)
MCV RBC AUTO: 74.7 FL — LOW (ref 80–100)
MONOCYTES # BLD AUTO: 0.91 K/UL — HIGH (ref 0–0.9)
MONOCYTES NFR BLD AUTO: 8.8 % — SIGNIFICANT CHANGE UP (ref 2–14)
NEUTROPHILS # BLD AUTO: 6.2 K/UL — SIGNIFICANT CHANGE UP (ref 1.8–7.4)
NEUTROPHILS NFR BLD AUTO: 59.6 % — SIGNIFICANT CHANGE UP (ref 43–77)
NRBC # BLD: 0 /100 WBCS — SIGNIFICANT CHANGE UP (ref 0–0)
PLATELET # BLD AUTO: 379 K/UL — SIGNIFICANT CHANGE UP (ref 150–400)
RBC # BLD: 4.67 M/UL — SIGNIFICANT CHANGE UP (ref 3.8–5.2)
RBC # FLD: 15.9 % — HIGH (ref 10.3–14.5)
WBC # BLD: 10.39 K/UL — SIGNIFICANT CHANGE UP (ref 3.8–10.5)
WBC # FLD AUTO: 10.39 K/UL — SIGNIFICANT CHANGE UP (ref 3.8–10.5)

## 2020-09-29 LAB
ALBUMIN SERPL ELPH-MCNC: 4.6 G/DL
ALP BLD-CCNC: 77 U/L
ALT SERPL-CCNC: 14 U/L
ANION GAP SERPL CALC-SCNC: 12 MMOL/L
AST SERPL-CCNC: 17 U/L
BILIRUB SERPL-MCNC: 0.2 MG/DL
BUN SERPL-MCNC: 13 MG/DL
CALCIUM SERPL-MCNC: 9.4 MG/DL
CHLORIDE SERPL-SCNC: 99 MMOL/L
CO2 SERPL-SCNC: 29 MMOL/L
CREAT SERPL-MCNC: 1.1 MG/DL
GLUCOSE SERPL-MCNC: 62 MG/DL
POTASSIUM SERPL-SCNC: 4.9 MMOL/L
PROT SERPL-MCNC: 7.3 G/DL
SODIUM SERPL-SCNC: 140 MMOL/L

## 2020-10-05 LAB — CGA SERPL-MCNC: 518 NG/ML

## 2020-10-24 ENCOUNTER — OUTPATIENT (OUTPATIENT)
Dept: OUTPATIENT SERVICES | Facility: HOSPITAL | Age: 50
LOS: 1 days | Discharge: HOME | End: 2020-10-24

## 2020-10-24 DIAGNOSIS — Z98.89 OTHER SPECIFIED POSTPROCEDURAL STATES: Chronic | ICD-10-CM

## 2020-10-24 DIAGNOSIS — D3A.8 OTHER BENIGN NEUROENDOCRINE TUMORS: ICD-10-CM

## 2020-10-27 ENCOUNTER — APPOINTMENT (OUTPATIENT)
Dept: INFUSION THERAPY | Facility: HOSPITAL | Age: 50
End: 2020-10-27

## 2020-11-19 ENCOUNTER — OUTPATIENT (OUTPATIENT)
Dept: OUTPATIENT SERVICES | Facility: HOSPITAL | Age: 50
LOS: 1 days | Discharge: HOME | End: 2020-11-19

## 2020-11-19 DIAGNOSIS — Z98.89 OTHER SPECIFIED POSTPROCEDURAL STATES: Chronic | ICD-10-CM

## 2020-11-19 DIAGNOSIS — D3A.8 OTHER BENIGN NEUROENDOCRINE TUMORS: ICD-10-CM

## 2020-11-24 ENCOUNTER — APPOINTMENT (OUTPATIENT)
Dept: INFUSION THERAPY | Facility: HOSPITAL | Age: 50
End: 2020-11-24

## 2020-11-25 ENCOUNTER — NON-APPOINTMENT (OUTPATIENT)
Age: 50
End: 2020-11-25

## 2020-12-24 ENCOUNTER — OUTPATIENT (OUTPATIENT)
Dept: OUTPATIENT SERVICES | Facility: HOSPITAL | Age: 50
LOS: 1 days | Discharge: HOME | End: 2020-12-24

## 2020-12-24 DIAGNOSIS — Z98.89 OTHER SPECIFIED POSTPROCEDURAL STATES: Chronic | ICD-10-CM

## 2020-12-24 DIAGNOSIS — D3A.8 OTHER BENIGN NEUROENDOCRINE TUMORS: ICD-10-CM

## 2020-12-28 ENCOUNTER — APPOINTMENT (OUTPATIENT)
Dept: INFUSION THERAPY | Facility: HOSPITAL | Age: 50
End: 2020-12-28

## 2020-12-29 ENCOUNTER — APPOINTMENT (OUTPATIENT)
Dept: INFUSION THERAPY | Facility: HOSPITAL | Age: 50
End: 2020-12-29

## 2020-12-30 ENCOUNTER — APPOINTMENT (OUTPATIENT)
Dept: HEMATOLOGY ONCOLOGY | Facility: CLINIC | Age: 50
End: 2020-12-30
Payer: COMMERCIAL

## 2020-12-30 PROCEDURE — 99213 OFFICE O/P EST LOW 20 MIN: CPT | Mod: 95

## 2020-12-30 NOTE — HISTORY OF PRESENT ILLNESS
[Disease: _____________________] : Disease: [unfilled] [T: ___] : T[unfilled] [N: ___] : N[unfilled] [M: ___] : M[unfilled] [AJCC Stage: ____] : AJCC Stage: [unfilled] [Home] : at home, [unfilled] , at the time of the visit. [Medical Office: ([facility])___] : at the medical office located in  [de-identified] : Ms. Franklin was a new set of health still December of 2013 when she noticed some vague abdominal pain at which time she presented to the emergency room was found to have a mesenteric mass measuring 4.2 cm in greatest dimension by the aortic bifurcation. She eventually underwent a laparoscopic biopsy by Dr. Dennis Moya Singh is found to be consistent with a neuroendocrine tumor. Patient was eventually seen by a gastroenterologist Dr. Adi Patel who performed an upper endoscopy and colonoscopy that was unremarkable and was socially followed by a capsule study that revealed a mass in the jejunum patient was subsequently taken to the OR by Dr. 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  since there is no evidence of disease at that time and no evidence of metastatic disease after discussion with the patient and her presentation at tumor patient was placed on surveillance therapy off treatment.\par \par In November 2017 found to have metastatic disease and started on lanreotide\par \par July 2019 CT c/a/p showed liver metastases, unchanged to slightly increased in size compared with the prior study. 4 mm left lower lobe groundglass pulmonary nodule, unchanged dating back to the 2014 exam, most consistent with a benign etiology. \par \par In the meantime she was evaluated by Dr. Lilly at [place] for liver transplantation. The patient has been noncompliant with her lanreotide treatment. The last dose was in July, 2019, missed Meera dose. Amairani comes in for follow up while on Lanreotide and feels well overall.  She denies any abdominal pain, diarrhea. She continues to work without issues. Patient still has not had consultation with IR to discuss liver directed therapy (she requested). \par \par 1/17/2020 she feels fine, works without any issues. She denies any abdominal pain, diarrhea.  She was seen by Dr. Armenta for liver-directed therapy, has not decided when to receive the treatment. \par \par 4/24/2020 she feels fine, works without any issues. She denies any abdominal pain, diarrhea.  She was seen by Dr. Armenta for liver-directed therapy, has not decided when to receive the treatment.  [de-identified] : chromogranin and serotonin [FreeTextEntry1] : Lanreotide  [de-identified] : 6/26/2020 : Ms Franklin  joined telehealth visit today. She received somatuline on 6/22/2020. No labs were obtained that day. She describes her "is moving slowly and her food seems to me slow slowly". She becker snot have NV and does not endorse pain. \par She feels well otherwise, working.\par \par 7/11/2020 CT c/a/p showed Liver metastases, overall increased in size and number since 7/13/2019. A 0.4 cm left lower lobe groundglass nodule is unchanged.. \par \par 9/4/2020 she reports feeling overall fine, eats well, sometimes epigastric discomfort, no pain, no weight loss. \par \par 12/30/2020 she had sore throat 10 days ago, had positive COVID test on 12/26 because of her coworker positive, otherwise feels overall fine, has good appetite, denies fever, chest pain, cough, and shortness of breath. \par \par \par \par \par

## 2020-12-30 NOTE — REVIEW OF SYSTEMS
[Negative] : Allergic/Immunologic [Fatigue] : no fatigue [Recent Change In Weight] : ~T no recent weight change [Chest Pain] : no chest pain [Lower Ext Edema] : no lower extremity edema [Shortness Of Breath] : no shortness of breath [Cough] : no cough [SOB on Exertion] : no shortness of breath during exertion [Abdominal Pain] : no abdominal pain [Constipation] : no constipation [Diarrhea] : no diarrhea

## 2020-12-30 NOTE — ASSESSMENT
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 CT scans in July showed stable disease. I have reviewed all the scans with the patient. She has metastatic disease, was discussed with the importance of compliance with her treatment. She feels fine, denies abdominal cramps, diarrhea. CT scans showed slightly increasing in size for her liver lesions. \par \par Plan\par \par strongly recommend following  Dr. Armenta for potential IR liver directed therapy after COVID resolved \par missed lanreotide monthly yesterday because of positive COVID test\par due for his interval scans too which will be postponed because of COVID+\par check labs this week\par RTC in 6 weeks\par

## 2021-01-21 ENCOUNTER — APPOINTMENT (OUTPATIENT)
Dept: INFUSION THERAPY | Facility: HOSPITAL | Age: 51
End: 2021-01-21

## 2021-02-03 ENCOUNTER — OUTPATIENT (OUTPATIENT)
Dept: OUTPATIENT SERVICES | Facility: HOSPITAL | Age: 51
LOS: 1 days | Discharge: HOME | End: 2021-02-03

## 2021-02-03 DIAGNOSIS — D3A.8 OTHER BENIGN NEUROENDOCRINE TUMORS: ICD-10-CM

## 2021-02-03 DIAGNOSIS — Z98.89 OTHER SPECIFIED POSTPROCEDURAL STATES: Chronic | ICD-10-CM

## 2021-02-08 ENCOUNTER — APPOINTMENT (OUTPATIENT)
Dept: HEMATOLOGY ONCOLOGY | Facility: CLINIC | Age: 51
End: 2021-02-08

## 2021-02-24 ENCOUNTER — APPOINTMENT (OUTPATIENT)
Dept: HEMATOLOGY ONCOLOGY | Facility: CLINIC | Age: 51
End: 2021-02-24
Payer: COMMERCIAL

## 2021-02-24 PROCEDURE — 99212 OFFICE O/P EST SF 10 MIN: CPT | Mod: 95

## 2021-02-24 NOTE — ASSESSMENT
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 CT scans in July showed stable disease. I have reviewed all the scans with the patient. She has metastatic disease, was discussed with the importance of compliance with her treatment. She feels fine, denies abdominal cramps, diarrhea. CT scans showed slightly increasing in size for her liver lesions in July 2020. \par \par Plan\par \par strongly recommend following  Dr. Armenta for potential IR liver directed therapy after COVID resolved \par missed lanreotide monthly yesterday because of positive COVID test\par due for his interval scans too which will be postponed because of COVID+\par check labs this week\par RTC in 4 weeks TEB\par

## 2021-02-24 NOTE — HISTORY OF PRESENT ILLNESS
[de-identified] : Ms. Franklin was a new set of health still December of 2013 when she noticed some vague abdominal pain at which time she presented to the emergency room was found to have a mesenteric mass measuring 4.2 cm in greatest dimension by the aortic bifurcation. She eventually underwent a laparoscopic biopsy by Dr. Dennis Moya Singh is found to be consistent with a neuroendocrine tumor. Patient was eventually seen by a gastroenterologist Dr. Adi Patel who performed an upper endoscopy and colonoscopy that was unremarkable and was socially followed by a capsule study that revealed a mass in the jejunum patient was subsequently taken to the OR by Dr. 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  since there is no evidence of disease at that time and no evidence of metastatic disease after discussion with the patient and her presentation at tumor patient was placed on surveillance therapy off treatment.\par \par In November 2017 found to have metastatic disease and started on lanreotide\par \par July 2019 CT c/a/p showed liver metastases, unchanged to slightly increased in size compared with the prior study. 4 mm left lower lobe groundglass pulmonary nodule, unchanged dating back to the 2014 exam, most consistent with a benign etiology. \par \par In the meantime she was evaluated by Dr. Lilly at [place] for liver transplantation. The patient has been noncompliant with her lanreotide treatment. The last dose was in July, 2019, missed Meera dose. Amairani comes in for follow up while on Lanreotide and feels well overall.  She denies any abdominal pain, diarrhea. She continues to work without issues. Patient still has not had consultation with IR to discuss liver directed therapy (she requested). \par \par 1/17/2020 she feels fine, works without any issues. She denies any abdominal pain, diarrhea.  She was seen by Dr. Armenta for liver-directed therapy, has not decided when to receive the treatment. \par \par 4/24/2020 she feels fine, works without any issues. She denies any abdominal pain, diarrhea.  She was seen by Dr. Armenta for liver-directed therapy, has not decided when to receive the treatment.  [de-identified] : chromogranin and serotonin [FreeTextEntry1] : Lanreotide  [de-identified] : 6/26/2020 : Ms Franklin  joined telehealth visit today. She received somatuline on 6/22/2020. No labs were obtained that day. She describes her "is moving slowly and her food seems to me slow slowly". She becker snot have NV and does not endorse pain. \par She feels well otherwise, working.\par \par 7/11/2020 CT c/a/p showed Liver metastases, overall increased in size and number since 7/13/2019. A 0.4 cm left lower lobe groundglass nodule is unchanged.. \par \par 9/4/2020 she reports feeling overall fine, eats well, sometimes epigastric discomfort, no pain, no weight loss. \par \par 12/30/2020 she had sore throat 10 days ago, had positive COVID test on 12/26 because of her coworker positive, otherwise feels overall fine, has good appetite, denies fever, chest pain, cough, and shortness of breath. \par \par 2/24/2021 she feels overall fine. eats well, denies nausea, vomiting, abdominal pain and diarrhea. \par \par \par \par \par

## 2021-02-25 ENCOUNTER — APPOINTMENT (OUTPATIENT)
Dept: INFUSION THERAPY | Facility: HOSPITAL | Age: 51
End: 2021-02-25

## 2021-03-14 ENCOUNTER — APPOINTMENT (OUTPATIENT)
Dept: CT IMAGING | Facility: IMAGING CENTER | Age: 51
End: 2021-03-14
Payer: COMMERCIAL

## 2021-03-14 ENCOUNTER — OUTPATIENT (OUTPATIENT)
Dept: OUTPATIENT SERVICES | Facility: HOSPITAL | Age: 51
LOS: 1 days | End: 2021-03-14
Payer: COMMERCIAL

## 2021-03-14 ENCOUNTER — RESULT REVIEW (OUTPATIENT)
Age: 51
End: 2021-03-14

## 2021-03-14 DIAGNOSIS — D3A.8 OTHER BENIGN NEUROENDOCRINE TUMORS: ICD-10-CM

## 2021-03-14 DIAGNOSIS — Z98.89 OTHER SPECIFIED POSTPROCEDURAL STATES: Chronic | ICD-10-CM

## 2021-03-14 PROCEDURE — 71260 CT THORAX DX C+: CPT

## 2021-03-14 PROCEDURE — 74177 CT ABD & PELVIS W/CONTRAST: CPT | Mod: 26

## 2021-03-14 PROCEDURE — 71260 CT THORAX DX C+: CPT | Mod: 26

## 2021-03-14 PROCEDURE — 74177 CT ABD & PELVIS W/CONTRAST: CPT

## 2021-03-19 ENCOUNTER — OUTPATIENT (OUTPATIENT)
Dept: OUTPATIENT SERVICES | Facility: HOSPITAL | Age: 51
LOS: 1 days | Discharge: HOME | End: 2021-03-19

## 2021-03-19 DIAGNOSIS — D3A.8 OTHER BENIGN NEUROENDOCRINE TUMORS: ICD-10-CM

## 2021-03-19 DIAGNOSIS — Z98.89 OTHER SPECIFIED POSTPROCEDURAL STATES: Chronic | ICD-10-CM

## 2021-03-24 ENCOUNTER — APPOINTMENT (OUTPATIENT)
Dept: HEMATOLOGY ONCOLOGY | Facility: CLINIC | Age: 51
End: 2021-03-24

## 2021-03-26 ENCOUNTER — RESULT REVIEW (OUTPATIENT)
Age: 51
End: 2021-03-26

## 2021-03-26 ENCOUNTER — APPOINTMENT (OUTPATIENT)
Dept: INFUSION THERAPY | Facility: HOSPITAL | Age: 51
End: 2021-03-26

## 2021-03-26 LAB
BASOPHILS # BLD AUTO: 0.07 K/UL — SIGNIFICANT CHANGE UP (ref 0–0.2)
BASOPHILS NFR BLD AUTO: 0.6 % — SIGNIFICANT CHANGE UP (ref 0–2)
EOSINOPHIL # BLD AUTO: 0.63 K/UL — HIGH (ref 0–0.5)
EOSINOPHIL NFR BLD AUTO: 5.6 % — SIGNIFICANT CHANGE UP (ref 0–6)
HCT VFR BLD CALC: 35.1 % — SIGNIFICANT CHANGE UP (ref 34.5–45)
HGB BLD-MCNC: 12 G/DL — SIGNIFICANT CHANGE UP (ref 11.5–15.5)
IMM GRANULOCYTES NFR BLD AUTO: 0.4 % — SIGNIFICANT CHANGE UP (ref 0–1.5)
LYMPHOCYTES # BLD AUTO: 2.63 K/UL — SIGNIFICANT CHANGE UP (ref 1–3.3)
LYMPHOCYTES # BLD AUTO: 23.3 % — SIGNIFICANT CHANGE UP (ref 13–44)
MCHC RBC-ENTMCNC: 24 PG — LOW (ref 27–34)
MCHC RBC-ENTMCNC: 34.2 G/DL — SIGNIFICANT CHANGE UP (ref 32–36)
MCV RBC AUTO: 70.2 FL — LOW (ref 80–100)
MONOCYTES # BLD AUTO: 0.76 K/UL — SIGNIFICANT CHANGE UP (ref 0–0.9)
MONOCYTES NFR BLD AUTO: 6.7 % — SIGNIFICANT CHANGE UP (ref 2–14)
NEUTROPHILS # BLD AUTO: 7.16 K/UL — SIGNIFICANT CHANGE UP (ref 1.8–7.4)
NEUTROPHILS NFR BLD AUTO: 63.4 % — SIGNIFICANT CHANGE UP (ref 43–77)
NRBC # BLD: 0 /100 WBCS — SIGNIFICANT CHANGE UP (ref 0–0)
PLATELET # BLD AUTO: 439 K/UL — HIGH (ref 150–400)
RBC # BLD: 5 M/UL — SIGNIFICANT CHANGE UP (ref 3.8–5.2)
RBC # FLD: 16.7 % — HIGH (ref 10.3–14.5)
WBC # BLD: 11.29 K/UL — HIGH (ref 3.8–10.5)
WBC # FLD AUTO: 11.29 K/UL — HIGH (ref 3.8–10.5)

## 2021-03-28 LAB
ALBUMIN SERPL ELPH-MCNC: 4.4 G/DL
ALP BLD-CCNC: 96 U/L
ALT SERPL-CCNC: 16 U/L
ANION GAP SERPL CALC-SCNC: 11 MMOL/L
AST SERPL-CCNC: 23 U/L
BILIRUB SERPL-MCNC: 0.2 MG/DL
BUN SERPL-MCNC: 11 MG/DL
CALCIUM SERPL-MCNC: 9.3 MG/DL
CHLORIDE SERPL-SCNC: 102 MMOL/L
CO2 SERPL-SCNC: 27 MMOL/L
CREAT SERPL-MCNC: 0.69 MG/DL
GLUCOSE SERPL-MCNC: 111 MG/DL
POTASSIUM SERPL-SCNC: 4 MMOL/L
PROT SERPL-MCNC: 7.1 G/DL
SODIUM SERPL-SCNC: 140 MMOL/L

## 2021-04-03 LAB — CGA SERPL-MCNC: 1400 NG/ML

## 2021-04-22 ENCOUNTER — OUTPATIENT (OUTPATIENT)
Dept: OUTPATIENT SERVICES | Facility: HOSPITAL | Age: 51
LOS: 1 days | Discharge: HOME | End: 2021-04-22

## 2021-04-22 DIAGNOSIS — D3A.8 OTHER BENIGN NEUROENDOCRINE TUMORS: ICD-10-CM

## 2021-04-22 DIAGNOSIS — Z98.89 OTHER SPECIFIED POSTPROCEDURAL STATES: Chronic | ICD-10-CM

## 2021-04-23 ENCOUNTER — APPOINTMENT (OUTPATIENT)
Dept: HEMATOLOGY ONCOLOGY | Facility: CLINIC | Age: 51
End: 2021-04-23
Payer: COMMERCIAL

## 2021-04-23 PROCEDURE — 99213 OFFICE O/P EST LOW 20 MIN: CPT | Mod: 95

## 2021-04-23 NOTE — REVIEW OF SYSTEMS
[Negative] : Heme/Lymph [Fatigue] : no fatigue [Recent Change In Weight] : ~T no recent weight change [Chest Pain] : no chest pain [Lower Ext Edema] : no lower extremity edema [Shortness Of Breath] : no shortness of breath [Cough] : no cough [SOB on Exertion] : no shortness of breath during exertion [Abdominal Pain] : no abdominal pain [Constipation] : no constipation [Diarrhea] : no diarrhea

## 2021-04-23 NOTE — HISTORY OF PRESENT ILLNESS
[Disease: _____________________] : Disease: [unfilled] [T: ___] : T[unfilled] [N: ___] : N[unfilled] [M: ___] : M[unfilled] [AJCC Stage: ____] : AJCC Stage: [unfilled] [Home] : at home, [unfilled] , at the time of the visit. [Medical Office: ([facility])___] : at the medical office located in  [de-identified] : Ms. Franklin was a new set of health still December of 2013 when she noticed some vague abdominal pain at which time she presented to the emergency room was found to have a mesenteric mass measuring 4.2 cm in greatest dimension by the aortic bifurcation. She eventually underwent a laparoscopic biopsy by Dr. Dennis Moya Singh is found to be consistent with a neuroendocrine tumor. Patient was eventually seen by a gastroenterologist Dr. Adi Patel who performed an upper endoscopy and colonoscopy that was unremarkable and was socially followed by a capsule study that revealed a mass in the jejunum patient was subsequently taken to the OR by Dr. 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  since there is no evidence of disease at that time and no evidence of metastatic disease after discussion with the patient and her presentation at tumor patient was placed on surveillance therapy off treatment.\par \par In November 2017 found to have metastatic disease and started on lanreotide\par \par July 2019 CT c/a/p showed liver metastases, unchanged to slightly increased in size compared with the prior study. 4 mm left lower lobe groundglass pulmonary nodule, unchanged dating back to the 2014 exam, most consistent with a benign etiology. \par \par In the meantime she was evaluated by Dr. Lilly at [place] for liver transplantation. The patient has been noncompliant with her lanreotide treatment. The last dose was in July, 2019, missed Meera dose. Amairani comes in for follow up while on Lanreotide and feels well overall.  She denies any abdominal pain, diarrhea. She continues to work without issues. Patient still has not had consultation with IR to discuss liver directed therapy (she requested). \par \par 1/17/2020 she feels fine, works without any issues. She denies any abdominal pain, diarrhea.  She was seen by Dr. Armenta for liver-directed therapy, has not decided when to receive the treatment. \par \par 4/24/2020 she feels fine, works without any issues. She denies any abdominal pain, diarrhea.  She was seen by Dr. Armenta for liver-directed therapy, has not decided when to receive the treatment.  [de-identified] : chromogranin and serotonin [FreeTextEntry1] : Lanreotide  [de-identified] : 6/26/2020 : Ms Franklin  joined telehealth visit today. She received somatuline on 6/22/2020. No labs were obtained that day. She describes her "is moving slowly and her food seems to me slow slowly". She becker snot have NV and does not endorse pain. \par She feels well otherwise, working.\par \par 7/11/2020 CT c/a/p showed Liver metastases, overall increased in size and number since 7/13/2019. A 0.4 cm left lower lobe groundglass nodule is unchanged.. \par \par 9/4/2020 she reports feeling overall fine, eats well, sometimes epigastric discomfort, no pain, no weight loss. \par \par 12/30/2020 she had sore throat 10 days ago, had positive COVID test on 12/26 because of her coworker positive, otherwise feels overall fine, has good appetite, denies fever, chest pain, cough, and shortness of breath. \par \par 2/24/2021 she feels overall fine. eats well, denies nausea, vomiting, abdominal pain and diarrhea. \par \par 3/14/21 CT c/a/p showed Bilobar hepatic metastatic disease with interval progression compared with prior imaging. Lesions demonstrate enhancement on arterial phase imaging\par Hepatic dome 2.8 x 2.8 cm, previously 2.2 x 1.8 cm. Segment 7 lesion 4.2 x 4.1 cm, previously 1.8 x 1.8 cm. Segment 5 lesion 3.2 x 3.2 cm, previously 2.8 x 2.5 cm. Segment 6 lesion 2.4 x 2.4 cm, previously 1.9 x 1.8 cm. He started having RUQ mild pain referring to her right shoulder, has good appetite, denies abdominal pain and diarrhea.  \par \par \par \par \par

## 2021-04-23 NOTE — ASSESSMENT
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 CT scans in July showed stable disease. I have reviewed all the scans with the patient. She has metastatic disease, was discussed with the importance of compliance with her treatment. She feels fine, denies abdominal cramps, diarrhea. CT scans showed slightly increasing in size for her liver lesions in July 2020. She has progression of disease for her liver metastasis in March, 2021 scans. \par \par Plan\par \par will see  Dr. Armenta for potential IR liver directed therapy after COVID resolved \par continue lanreotide monthly injection \par check labs \par RTC in 4 weeks TEB\par

## 2021-04-28 ENCOUNTER — APPOINTMENT (OUTPATIENT)
Dept: INFUSION THERAPY | Facility: HOSPITAL | Age: 51
End: 2021-04-28

## 2021-05-26 ENCOUNTER — OUTPATIENT (OUTPATIENT)
Dept: OUTPATIENT SERVICES | Facility: HOSPITAL | Age: 51
LOS: 1 days | Discharge: HOME | End: 2021-05-26

## 2021-05-26 DIAGNOSIS — D3A.8 OTHER BENIGN NEUROENDOCRINE TUMORS: ICD-10-CM

## 2021-05-26 DIAGNOSIS — Z98.89 OTHER SPECIFIED POSTPROCEDURAL STATES: Chronic | ICD-10-CM

## 2021-05-27 ENCOUNTER — APPOINTMENT (OUTPATIENT)
Dept: INFUSION THERAPY | Facility: HOSPITAL | Age: 51
End: 2021-05-27

## 2021-06-02 ENCOUNTER — APPOINTMENT (OUTPATIENT)
Dept: INTERVENTIONAL RADIOLOGY/VASCULAR | Facility: CLINIC | Age: 51
End: 2021-06-02
Payer: COMMERCIAL

## 2021-06-02 VITALS
WEIGHT: 118 LBS | OXYGEN SATURATION: 100 % | HEART RATE: 62 BPM | HEIGHT: 62 IN | DIASTOLIC BLOOD PRESSURE: 78 MMHG | BODY MASS INDEX: 21.71 KG/M2 | SYSTOLIC BLOOD PRESSURE: 121 MMHG | RESPIRATION RATE: 16 BRPM

## 2021-06-02 PROCEDURE — 99215 OFFICE O/P EST HI 40 MIN: CPT

## 2021-06-02 RX ORDER — MULTIVITAMIN
TABLET ORAL
Refills: 0 | Status: ACTIVE | COMMUNITY

## 2021-06-04 ENCOUNTER — APPOINTMENT (OUTPATIENT)
Dept: HEMATOLOGY ONCOLOGY | Facility: CLINIC | Age: 51
End: 2021-06-04

## 2021-06-14 ENCOUNTER — RESULT REVIEW (OUTPATIENT)
Age: 51
End: 2021-06-14

## 2021-06-22 ENCOUNTER — OUTPATIENT (OUTPATIENT)
Dept: OUTPATIENT SERVICES | Facility: HOSPITAL | Age: 51
LOS: 1 days | Discharge: HOME | End: 2021-06-22

## 2021-06-22 DIAGNOSIS — Z98.89 OTHER SPECIFIED POSTPROCEDURAL STATES: Chronic | ICD-10-CM

## 2021-06-22 DIAGNOSIS — D3A.8 OTHER BENIGN NEUROENDOCRINE TUMORS: ICD-10-CM

## 2021-06-28 ENCOUNTER — APPOINTMENT (OUTPATIENT)
Dept: INFUSION THERAPY | Facility: HOSPITAL | Age: 51
End: 2021-06-28

## 2021-07-17 ENCOUNTER — APPOINTMENT (OUTPATIENT)
Dept: CT IMAGING | Facility: IMAGING CENTER | Age: 51
End: 2021-07-17
Payer: COMMERCIAL

## 2021-07-17 ENCOUNTER — OUTPATIENT (OUTPATIENT)
Dept: OUTPATIENT SERVICES | Facility: HOSPITAL | Age: 51
LOS: 1 days | End: 2021-07-17
Payer: COMMERCIAL

## 2021-07-17 DIAGNOSIS — C78.7 SECONDARY MALIGNANT NEOPLASM OF LIVER AND INTRAHEPATIC BILE DUCT: ICD-10-CM

## 2021-07-17 DIAGNOSIS — Z98.89 OTHER SPECIFIED POSTPROCEDURAL STATES: Chronic | ICD-10-CM

## 2021-07-17 DIAGNOSIS — Z00.8 ENCOUNTER FOR OTHER GENERAL EXAMINATION: ICD-10-CM

## 2021-07-17 PROCEDURE — 71260 CT THORAX DX C+: CPT | Mod: 26

## 2021-07-17 PROCEDURE — 71260 CT THORAX DX C+: CPT

## 2021-07-17 PROCEDURE — 82565 ASSAY OF CREATININE: CPT

## 2021-07-17 PROCEDURE — 74170 CT ABD WO CNTRST FLWD CNTRST: CPT | Mod: 26

## 2021-07-17 PROCEDURE — 74170 CT ABD WO CNTRST FLWD CNTRST: CPT

## 2021-07-27 ENCOUNTER — OUTPATIENT (OUTPATIENT)
Dept: OUTPATIENT SERVICES | Facility: HOSPITAL | Age: 51
LOS: 1 days | Discharge: HOME | End: 2021-07-27

## 2021-07-27 DIAGNOSIS — D3A.8 OTHER BENIGN NEUROENDOCRINE TUMORS: ICD-10-CM

## 2021-07-27 DIAGNOSIS — Z98.89 OTHER SPECIFIED POSTPROCEDURAL STATES: Chronic | ICD-10-CM

## 2021-07-28 ENCOUNTER — LABORATORY RESULT (OUTPATIENT)
Age: 51
End: 2021-07-28

## 2021-07-28 ENCOUNTER — RESULT REVIEW (OUTPATIENT)
Age: 51
End: 2021-07-28

## 2021-07-28 ENCOUNTER — APPOINTMENT (OUTPATIENT)
Dept: INFUSION THERAPY | Facility: HOSPITAL | Age: 51
End: 2021-07-28

## 2021-07-28 ENCOUNTER — APPOINTMENT (OUTPATIENT)
Dept: HEMATOLOGY ONCOLOGY | Facility: CLINIC | Age: 51
End: 2021-07-28

## 2021-07-28 LAB
BASOPHILS # BLD AUTO: 0.05 K/UL — SIGNIFICANT CHANGE UP (ref 0–0.2)
BASOPHILS NFR BLD AUTO: 0.4 % — SIGNIFICANT CHANGE UP (ref 0–2)
EOSINOPHIL # BLD AUTO: 0.34 K/UL — SIGNIFICANT CHANGE UP (ref 0–0.5)
EOSINOPHIL NFR BLD AUTO: 2.6 % — SIGNIFICANT CHANGE UP (ref 0–6)
HCT VFR BLD CALC: 33.5 % — LOW (ref 34.5–45)
HGB BLD-MCNC: 11.2 G/DL — LOW (ref 11.5–15.5)
IMM GRANULOCYTES NFR BLD AUTO: 0.5 % — SIGNIFICANT CHANGE UP (ref 0–1.5)
LYMPHOCYTES # BLD AUTO: 15.9 % — SIGNIFICANT CHANGE UP (ref 13–44)
LYMPHOCYTES # BLD AUTO: 2.1 K/UL — SIGNIFICANT CHANGE UP (ref 1–3.3)
MCHC RBC-ENTMCNC: 22.7 PG — LOW (ref 27–34)
MCHC RBC-ENTMCNC: 33.4 G/DL — SIGNIFICANT CHANGE UP (ref 32–36)
MCV RBC AUTO: 67.8 FL — LOW (ref 80–100)
MONOCYTES # BLD AUTO: 1.42 K/UL — HIGH (ref 0–0.9)
MONOCYTES NFR BLD AUTO: 10.8 % — SIGNIFICANT CHANGE UP (ref 2–14)
NEUTROPHILS # BLD AUTO: 9.23 K/UL — HIGH (ref 1.8–7.4)
NEUTROPHILS NFR BLD AUTO: 69.8 % — SIGNIFICANT CHANGE UP (ref 43–77)
NRBC # BLD: 0 /100 WBCS — SIGNIFICANT CHANGE UP (ref 0–0)
PLATELET # BLD AUTO: 424 K/UL — HIGH (ref 150–400)
RBC # BLD: 4.94 M/UL — SIGNIFICANT CHANGE UP (ref 3.8–5.2)
RBC # FLD: 18.5 % — HIGH (ref 10.3–14.5)
WBC # BLD: 13.2 K/UL — HIGH (ref 3.8–10.5)
WBC # FLD AUTO: 13.2 K/UL — HIGH (ref 3.8–10.5)

## 2021-08-06 ENCOUNTER — OUTPATIENT (OUTPATIENT)
Dept: OUTPATIENT SERVICES | Facility: HOSPITAL | Age: 51
LOS: 1 days | End: 2021-08-06
Payer: COMMERCIAL

## 2021-08-06 VITALS
RESPIRATION RATE: 18 BRPM | OXYGEN SATURATION: 98 % | DIASTOLIC BLOOD PRESSURE: 70 MMHG | HEART RATE: 78 BPM | HEIGHT: 62 IN | SYSTOLIC BLOOD PRESSURE: 110 MMHG | TEMPERATURE: 98 F | WEIGHT: 119.05 LBS

## 2021-08-06 DIAGNOSIS — Z92.29 PERSONAL HISTORY OF OTHER DRUG THERAPY: ICD-10-CM

## 2021-08-06 DIAGNOSIS — Z90.49 ACQUIRED ABSENCE OF OTHER SPECIFIED PARTS OF DIGESTIVE TRACT: Chronic | ICD-10-CM

## 2021-08-06 DIAGNOSIS — Z98.89 OTHER SPECIFIED POSTPROCEDURAL STATES: Chronic | ICD-10-CM

## 2021-08-06 DIAGNOSIS — C22.9 MALIGNANT NEOPLASM OF LIVER, NOT SPECIFIED AS PRIMARY OR SECONDARY: ICD-10-CM

## 2021-08-06 DIAGNOSIS — K76.9 LIVER DISEASE, UNSPECIFIED: ICD-10-CM

## 2021-08-06 LAB
ALBUMIN SERPL ELPH-MCNC: 4.1 G/DL — SIGNIFICANT CHANGE UP (ref 3.3–5)
ALP SERPL-CCNC: 141 U/L — HIGH (ref 40–120)
ALT FLD-CCNC: 15 U/L — SIGNIFICANT CHANGE UP (ref 4–33)
ANION GAP SERPL CALC-SCNC: 12 MMOL/L — SIGNIFICANT CHANGE UP (ref 7–14)
APTT BLD: 34.3 SEC — SIGNIFICANT CHANGE UP (ref 27–36.3)
AST SERPL-CCNC: 19 U/L — SIGNIFICANT CHANGE UP (ref 4–32)
BILIRUB DIRECT SERPL-MCNC: <0.2 MG/DL — SIGNIFICANT CHANGE UP (ref 0–0.2)
BILIRUB INDIRECT FLD-MCNC: >0.1 MG/DL — SIGNIFICANT CHANGE UP (ref 0–1)
BILIRUB SERPL-MCNC: 0.3 MG/DL — SIGNIFICANT CHANGE UP (ref 0.2–1.2)
BILIRUB SERPL-MCNC: 0.3 MG/DL — SIGNIFICANT CHANGE UP (ref 0.2–1.2)
BUN SERPL-MCNC: 7 MG/DL — SIGNIFICANT CHANGE UP (ref 7–23)
CALCIUM SERPL-MCNC: 9.7 MG/DL — SIGNIFICANT CHANGE UP (ref 8.4–10.5)
CHLORIDE SERPL-SCNC: 101 MMOL/L — SIGNIFICANT CHANGE UP (ref 98–107)
CO2 SERPL-SCNC: 26 MMOL/L — SIGNIFICANT CHANGE UP (ref 22–31)
CREAT SERPL-MCNC: 0.72 MG/DL — SIGNIFICANT CHANGE UP (ref 0.5–1.3)
GLUCOSE SERPL-MCNC: 91 MG/DL — SIGNIFICANT CHANGE UP (ref 70–99)
HCT VFR BLD CALC: 32 % — LOW (ref 34.5–45)
HGB BLD-MCNC: 10.5 G/DL — LOW (ref 11.5–15.5)
INR BLD: 1.32 RATIO — HIGH (ref 0.88–1.16)
MCHC RBC-ENTMCNC: 22.3 PG — LOW (ref 27–34)
MCHC RBC-ENTMCNC: 32.8 GM/DL — SIGNIFICANT CHANGE UP (ref 32–36)
MCV RBC AUTO: 67.9 FL — LOW (ref 80–100)
NRBC # BLD: 0 /100 WBCS — SIGNIFICANT CHANGE UP
NRBC # FLD: 0 K/UL — SIGNIFICANT CHANGE UP
PLATELET # BLD AUTO: 714 K/UL — HIGH (ref 150–400)
POTASSIUM SERPL-MCNC: 4.6 MMOL/L — SIGNIFICANT CHANGE UP (ref 3.5–5.3)
POTASSIUM SERPL-SCNC: 4.6 MMOL/L — SIGNIFICANT CHANGE UP (ref 3.5–5.3)
PROT SERPL-MCNC: 8.1 G/DL — SIGNIFICANT CHANGE UP (ref 6–8.3)
PROTHROM AB SERPL-ACNC: 14.9 SEC — HIGH (ref 10.6–13.6)
RBC # BLD: 4.71 M/UL — SIGNIFICANT CHANGE UP (ref 3.8–5.2)
RBC # FLD: 18.3 % — HIGH (ref 10.3–14.5)
SODIUM SERPL-SCNC: 139 MMOL/L — SIGNIFICANT CHANGE UP (ref 135–145)
WBC # BLD: 12.82 K/UL — HIGH (ref 3.8–10.5)
WBC # FLD AUTO: 12.82 K/UL — HIGH (ref 3.8–10.5)

## 2021-08-06 PROCEDURE — 93010 ELECTROCARDIOGRAM REPORT: CPT

## 2021-08-06 NOTE — H&P PST ADULT - HISTORY OF PRESENT ILLNESS
50y/o female presents for preop eval for scheduled Mapping & SIRT.  Pt with h/o malignant  50y/o female presents for preop eval for scheduled Mapping & SIRT.  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  h/o small bowel resection 2014.  Followed by oncology.

## 2021-08-06 NOTE — H&P PST ADULT - LYMPH NODES
"Subjective   Eliseo Phan is a 54 y.o. male.     Pleasant patient here to discuss tobacco abuse has been smoking for quite few years he quit but resumed, Zyban generic is helped in the past he would like he has no contraindications she had no problems previously    No family history colon cancer no personal history of polyps  Like a Cologuard we discussed risk and benefit  The gold standard is colonoscopy and is preferable discussed the potential for missed cancers as well as false positives with Cologuard    He already has a kit  Some decreased libido fatigue some erectile issues able to get erection but difficulty maintaining not presently sexually active but would like his testosterone checked    Some mild dyspepsia some abdominal bloating but no diarrhea chronic abdominal pain  Family history of some celiac problems and would like  No night sweats unexplained weight loss HIV stable and he is up-to-date with infectious disease  See labs and viral load         /72   Pulse 75   Temp 97.1 °F (36.2 °C) (Temporal)   Ht 177.8 cm (70\")   Wt 76.7 kg (169 lb)   SpO2 95%   BMI 24.25 kg/m²       The following portions of the patient's history were reviewed and updated as appropriate: allergies, current medications, past family history, past medical history, past social history, past surgical history and problem list.    Review of Systems   Constitutional: Negative for fatigue and fever.   HENT: Negative.  Negative for trouble swallowing.    Eyes: Negative.    Respiratory: Negative.  Negative for cough and shortness of breath.    Cardiovascular: Negative for chest pain, palpitations and leg swelling.   Gastrointestinal: Negative.  Negative for abdominal pain.   Genitourinary: Negative.    Musculoskeletal: Negative.    Skin: Negative.    Neurological: Negative.  Negative for dizziness and confusion.   Psychiatric/Behavioral: Negative.        Objective   Physical Exam  Vitals reviewed.   Constitutional:       " Appearance: He is well-developed.   HENT:      Head: Normocephalic.      Nose: Nose normal.   Eyes:      General: No scleral icterus.     Conjunctiva/sclera: Conjunctivae normal.      Pupils: Pupils are equal, round, and reactive to light.   Neck:      Thyroid: No thyromegaly.      Vascular: No JVD.   Cardiovascular:      Rate and Rhythm: Normal rate and regular rhythm.      Heart sounds: Normal heart sounds. No murmur heard.   No friction rub. No gallop.    Pulmonary:      Effort: Pulmonary effort is normal. No respiratory distress.      Breath sounds: Normal breath sounds. No stridor. No wheezing or rales.   Abdominal:      General: Bowel sounds are normal. There is no distension.      Palpations: Abdomen is soft.      Tenderness: There is no abdominal tenderness.      Comments: No hepatosplenomegaly, no ascites,   Musculoskeletal:         General: No tenderness.      Cervical back: Neck supple.   Lymphadenopathy:      Cervical: No cervical adenopathy.   Skin:     General: Skin is warm and dry.      Findings: No erythema or rash.   Neurological:      Mental Status: He is alert and oriented to person, place, and time.      Deep Tendon Reflexes: Reflexes are normal and symmetric.   Psychiatric:         Behavior: Behavior normal.         Thought Content: Thought content normal.         Judgment: Judgment normal.           Assessment/Plan   Diagnoses and all orders for this visit:    1. Tobacco abuse (Primary)  -     CBC & Differential  -     Comprehensive Metabolic Panel  -     Lipid Panel With LDL / HDL Ratio  -     PSA Screen  -     Urinalysis With Microscopic If Indicated (No Culture) - Urine, Clean Catch  -     TSH Rfx On Abnormal To Free T4  -     Testosterone, Free, Total  -     Celiac Disease Antibody Screen    2. Prostate cancer screening  -     CBC & Differential  -     Comprehensive Metabolic Panel  -     Lipid Panel With LDL / HDL Ratio  -     PSA Screen  -     Urinalysis With Microscopic If Indicated (No  Culture) - Urine, Clean Catch  -     TSH Rfx On Abnormal To Free T4  -     Testosterone, Free, Total  -     Celiac Disease Antibody Screen    3. High risk medication use  -     CBC & Differential  -     Comprehensive Metabolic Panel  -     Lipid Panel With LDL / HDL Ratio  -     PSA Screen  -     Urinalysis With Microscopic If Indicated (No Culture) - Urine, Clean Catch  -     TSH Rfx On Abnormal To Free T4  -     Testosterone, Free, Total  -     Celiac Disease Antibody Screen    4. Decreased libido  -     CBC & Differential  -     Comprehensive Metabolic Panel  -     Lipid Panel With LDL / HDL Ratio  -     PSA Screen  -     Urinalysis With Microscopic If Indicated (No Culture) - Urine, Clean Catch  -     TSH Rfx On Abnormal To Free T4  -     Testosterone, Free, Total  -     Celiac Disease Antibody Screen    5. Other male erectile dysfunction  -     CBC & Differential  -     Comprehensive Metabolic Panel  -     Lipid Panel With LDL / HDL Ratio  -     PSA Screen  -     Urinalysis With Microscopic If Indicated (No Culture) - Urine, Clean Catch  -     TSH Rfx On Abnormal To Free T4  -     Testosterone, Free, Total  -     Celiac Disease Antibody Screen    6. Abdominal bloating  -     CBC & Differential  -     Comprehensive Metabolic Panel  -     Lipid Panel With LDL / HDL Ratio  -     PSA Screen  -     Urinalysis With Microscopic If Indicated (No Culture) - Urine, Clean Catch  -     TSH Rfx On Abnormal To Free T4  -     Testosterone, Free, Total  -     Celiac Disease Antibody Screen    7. Dyspepsia  -     CBC & Differential  -     Comprehensive Metabolic Panel  -     Lipid Panel With LDL / HDL Ratio  -     PSA Screen  -     Urinalysis With Microscopic If Indicated (No Culture) - Urine, Clean Catch  -     TSH Rfx On Abnormal To Free T4  -     Testosterone, Free, Total  -     Celiac Disease Antibody Screen    8. Health maintenance examination  -     CBC & Differential  -     Comprehensive Metabolic Panel  -     Lipid  Panel With LDL / HDL Ratio  -     PSA Screen  -     Urinalysis With Microscopic If Indicated (No Culture) - Urine, Clean Catch  -     TSH Rfx On Abnormal To Free T4  -     Testosterone, Free, Total  -     Celiac Disease Antibody Screen    Other orders  -     buPROPion SR (Wellbutrin SR) 150 MG 12 hr tablet; Take 1 tablet by mouth 2 (Two) Times a Day. For tobacco cessation  Dispense: 180 tablet; Refill: 1  -     omeprazole (priLOSEC) 20 MG capsule; Take 1 capsule by mouth Daily.  Dispense: 90 capsule; Refill: 3      See plan  Change people places and things discontinue if any problems  See me message in 6 weeks for follow-up update on progress otherwise follow-up in 6 months              There are no Patient Instructions on file for this visit.            detailed exam

## 2021-08-06 NOTE — H&P PST ADULT - NEGATIVE ENMT SYMPTOMS
no hearing difficulty/no ear pain/no tinnitus/no sinus symptoms/no nasal congestion/no nasal discharge/no nasal obstruction/no nose bleeds/no recurrent cold sores/no abnormal taste sensation/no gum bleeding/no dry mouth/no throat pain/no dysphagia no hearing difficulty/no ear pain/no tinnitus/no vertigo/no sinus symptoms/no nasal congestion/no nasal discharge/no nasal obstruction/no nose bleeds/no recurrent cold sores/no abnormal taste sensation/no gum bleeding/no dry mouth/no throat pain/no dysphagia

## 2021-08-06 NOTE — H&P PST ADULT - NSICDXPASTMEDICALHX_GEN_ALL_CORE_FT
PAST MEDICAL HISTORY:  Anemia     Malignant neoplasm of small intestine     Neoplasm of digestive system     Vertigo

## 2021-08-06 NOTE — H&P PST ADULT - NEGATIVE GENERAL SYMPTOMS
no fever/no chills/no sweating/no weight loss/no weight gain/no polyphagia/no polyuria/no polydipsia/no malaise/no fatigue

## 2021-08-06 NOTE — H&P PST ADULT - NSICDXPASTSURGICALHX_GEN_ALL_CORE_FT
PAST SURGICAL HISTORY:  S/P laparoscopy S/P Excsion left retroperitoneal mass 02/3/2014     PAST SURGICAL HISTORY:  H/O resection of small bowel 2014    S/P laparoscopy S/P Excsion left retroperitoneal mass 02/3/2014

## 2021-08-06 NOTE — H&P PST ADULT - NSICDXPROBLEM_GEN_ALL_CORE_FT
PROBLEM DIAGNOSES  Problem: COVID-19 vaccine series completed  Assessment and Plan: completed, pt instructed bring vaccine card on day of surgery  per pt scheduled within 72 hrs of this surgery     Problem: Liver cancer  Assessment and Plan: Schedule for Mapping & SIRT on 8/12/21  Written & verbal preop instructions, gi prophylaxis & surgical soap given  Pt verbalized good understanding.  Teach back done on surgical soap instructions.

## 2021-08-09 ENCOUNTER — APPOINTMENT (OUTPATIENT)
Dept: DISASTER EMERGENCY | Facility: CLINIC | Age: 51
End: 2021-08-09

## 2021-08-09 DIAGNOSIS — Z01.818 ENCOUNTER FOR OTHER PREPROCEDURAL EXAMINATION: ICD-10-CM

## 2021-08-12 ENCOUNTER — OUTPATIENT (OUTPATIENT)
Dept: OUTPATIENT SERVICES | Facility: HOSPITAL | Age: 51
LOS: 1 days | End: 2021-08-12
Payer: COMMERCIAL

## 2021-08-12 ENCOUNTER — RESULT REVIEW (OUTPATIENT)
Age: 51
End: 2021-08-12

## 2021-08-12 ENCOUNTER — APPOINTMENT (OUTPATIENT)
Dept: NUCLEAR MEDICINE | Facility: HOSPITAL | Age: 51
End: 2021-08-12

## 2021-08-12 DIAGNOSIS — Z98.89 OTHER SPECIFIED POSTPROCEDURAL STATES: Chronic | ICD-10-CM

## 2021-08-12 DIAGNOSIS — K63.89 OTHER SPECIFIED DISEASES OF INTESTINE: ICD-10-CM

## 2021-08-12 DIAGNOSIS — Z90.49 ACQUIRED ABSENCE OF OTHER SPECIFIED PARTS OF DIGESTIVE TRACT: Chronic | ICD-10-CM

## 2021-08-12 DIAGNOSIS — C78.7 SECONDARY MALIGNANT NEOPLASM OF LIVER AND INTRAHEPATIC BILE DUCT: ICD-10-CM

## 2021-08-12 LAB — HCG UR QL: NEGATIVE — SIGNIFICANT CHANGE UP

## 2021-08-12 PROCEDURE — 36247 INS CATH ABD/L-EXT ART 3RD: CPT

## 2021-08-12 PROCEDURE — 78201 LIVER IMAGING STATIC ONLY: CPT | Mod: 26

## 2021-08-12 PROCEDURE — 75774 ARTERY X-RAY EACH VESSEL: CPT | Mod: 26,59

## 2021-08-12 PROCEDURE — 36248 INS CATH ABD/L-EXT ART ADDL: CPT | Mod: 59

## 2021-08-12 PROCEDURE — 76937 US GUIDE VASCULAR ACCESS: CPT | Mod: 26

## 2021-08-12 PROCEDURE — 37242 VASC EMBOLIZE/OCCLUDE ARTERY: CPT | Mod: 53

## 2021-08-12 PROCEDURE — 75726 ARTERY X-RAYS ABDOMEN: CPT | Mod: 26,59,76

## 2021-08-12 PROCEDURE — 36245 INS CATH ABD/L-EXT ART 1ST: CPT | Mod: 59

## 2021-08-18 ENCOUNTER — NON-APPOINTMENT (OUTPATIENT)
Age: 51
End: 2021-08-18

## 2021-08-18 ENCOUNTER — RESULT REVIEW (OUTPATIENT)
Age: 51
End: 2021-08-18

## 2021-08-18 ENCOUNTER — OUTPATIENT (OUTPATIENT)
Dept: OUTPATIENT SERVICES | Facility: HOSPITAL | Age: 51
LOS: 1 days | End: 2021-08-18

## 2021-08-18 ENCOUNTER — APPOINTMENT (OUTPATIENT)
Dept: ULTRASOUND IMAGING | Facility: HOSPITAL | Age: 51
End: 2021-08-18
Payer: COMMERCIAL

## 2021-08-18 VITALS
TEMPERATURE: 97.7 F | RESPIRATION RATE: 18 BRPM | BODY MASS INDEX: 21.71 KG/M2 | OXYGEN SATURATION: 100 % | DIASTOLIC BLOOD PRESSURE: 77 MMHG | HEART RATE: 60 BPM | HEIGHT: 62 IN | SYSTOLIC BLOOD PRESSURE: 135 MMHG | WEIGHT: 118 LBS

## 2021-08-18 DIAGNOSIS — C78.7 SECONDARY MALIGNANT NEOPLASM OF LIVER AND INTRAHEPATIC BILE DUCT: ICD-10-CM

## 2021-08-18 DIAGNOSIS — Z90.49 ACQUIRED ABSENCE OF OTHER SPECIFIED PARTS OF DIGESTIVE TRACT: Chronic | ICD-10-CM

## 2021-08-18 DIAGNOSIS — D3A.8 OTHER BENIGN NEUROENDOCRINE TUMORS: ICD-10-CM

## 2021-08-18 DIAGNOSIS — Z98.89 OTHER SPECIFIED POSTPROCEDURAL STATES: Chronic | ICD-10-CM

## 2021-08-18 PROCEDURE — 93926 LOWER EXTREMITY STUDY: CPT | Mod: 26,RT

## 2021-08-19 ENCOUNTER — OUTPATIENT (OUTPATIENT)
Dept: OUTPATIENT SERVICES | Facility: HOSPITAL | Age: 51
LOS: 1 days | End: 2021-08-19
Payer: COMMERCIAL

## 2021-08-19 DIAGNOSIS — D3A.8 OTHER BENIGN NEUROENDOCRINE TUMORS: ICD-10-CM

## 2021-08-19 DIAGNOSIS — C78.7 SECONDARY MALIGNANT NEOPLASM OF LIVER AND INTRAHEPATIC BILE DUCT: ICD-10-CM

## 2021-08-19 DIAGNOSIS — Z98.89 OTHER SPECIFIED POSTPROCEDURAL STATES: Chronic | ICD-10-CM

## 2021-08-19 DIAGNOSIS — Z90.49 ACQUIRED ABSENCE OF OTHER SPECIFIED PARTS OF DIGESTIVE TRACT: Chronic | ICD-10-CM

## 2021-08-19 LAB
ALBUMIN SERPL ELPH-MCNC: 4.3 G/DL — SIGNIFICANT CHANGE UP (ref 3.3–5)
ALP SERPL-CCNC: 151 U/L — HIGH (ref 40–120)
ALT FLD-CCNC: 17 U/L — SIGNIFICANT CHANGE UP (ref 4–33)
ANION GAP SERPL CALC-SCNC: 7 MMOL/L — SIGNIFICANT CHANGE UP (ref 7–14)
APTT BLD: 38.1 SEC — HIGH (ref 27–36.3)
AST SERPL-CCNC: 26 U/L — SIGNIFICANT CHANGE UP (ref 4–32)
BASOPHILS # BLD AUTO: 0.06 K/UL — SIGNIFICANT CHANGE UP (ref 0–0.2)
BASOPHILS NFR BLD AUTO: 0.7 % — SIGNIFICANT CHANGE UP (ref 0–2)
BILIRUB SERPL-MCNC: 0.3 MG/DL — SIGNIFICANT CHANGE UP (ref 0.2–1.2)
BUN SERPL-MCNC: 11 MG/DL — SIGNIFICANT CHANGE UP (ref 7–23)
CALCIUM SERPL-MCNC: 10.2 MG/DL — SIGNIFICANT CHANGE UP (ref 8.4–10.5)
CHLORIDE SERPL-SCNC: 104 MMOL/L — SIGNIFICANT CHANGE UP (ref 98–107)
CO2 SERPL-SCNC: 26 MMOL/L — SIGNIFICANT CHANGE UP (ref 22–31)
CREAT SERPL-MCNC: 0.73 MG/DL — SIGNIFICANT CHANGE UP (ref 0.5–1.3)
EOSINOPHIL # BLD AUTO: 0.51 K/UL — HIGH (ref 0–0.5)
EOSINOPHIL NFR BLD AUTO: 5.7 % — SIGNIFICANT CHANGE UP (ref 0–6)
GLUCOSE SERPL-MCNC: 102 MG/DL — HIGH (ref 70–99)
HCT VFR BLD CALC: 33.4 % — LOW (ref 34.5–45)
HGB BLD-MCNC: 10.9 G/DL — LOW (ref 11.5–15.5)
IANC: 6.03 K/UL — SIGNIFICANT CHANGE UP (ref 1.5–8.5)
IMM GRANULOCYTES NFR BLD AUTO: 0.6 % — SIGNIFICANT CHANGE UP (ref 0–1.5)
INR BLD: 1.16 RATIO — SIGNIFICANT CHANGE UP (ref 0.88–1.16)
LYMPHOCYTES # BLD AUTO: 1.86 K/UL — SIGNIFICANT CHANGE UP (ref 1–3.3)
LYMPHOCYTES # BLD AUTO: 20.8 % — SIGNIFICANT CHANGE UP (ref 13–44)
MCHC RBC-ENTMCNC: 22.2 PG — LOW (ref 27–34)
MCHC RBC-ENTMCNC: 32.6 GM/DL — SIGNIFICANT CHANGE UP (ref 32–36)
MCV RBC AUTO: 68 FL — LOW (ref 80–100)
MONOCYTES # BLD AUTO: 0.44 K/UL — SIGNIFICANT CHANGE UP (ref 0–0.9)
MONOCYTES NFR BLD AUTO: 4.9 % — SIGNIFICANT CHANGE UP (ref 2–14)
NEUTROPHILS # BLD AUTO: 6.03 K/UL — SIGNIFICANT CHANGE UP (ref 1.8–7.4)
NEUTROPHILS NFR BLD AUTO: 67.3 % — SIGNIFICANT CHANGE UP (ref 43–77)
NRBC # BLD: 0 /100 WBCS — SIGNIFICANT CHANGE UP
NRBC # FLD: 0 K/UL — SIGNIFICANT CHANGE UP
PLATELET # BLD AUTO: 671 K/UL — HIGH (ref 150–400)
POTASSIUM SERPL-MCNC: 4.7 MMOL/L — SIGNIFICANT CHANGE UP (ref 3.5–5.3)
POTASSIUM SERPL-SCNC: 4.7 MMOL/L — SIGNIFICANT CHANGE UP (ref 3.5–5.3)
PROT SERPL-MCNC: 8.5 G/DL — HIGH (ref 6–8.3)
PROTHROM AB SERPL-ACNC: 13.2 SEC — SIGNIFICANT CHANGE UP (ref 10.6–13.6)
RBC # BLD: 4.91 M/UL — SIGNIFICANT CHANGE UP (ref 3.8–5.2)
RBC # FLD: 18.1 % — HIGH (ref 10.3–14.5)
SODIUM SERPL-SCNC: 137 MMOL/L — SIGNIFICANT CHANGE UP (ref 135–145)
WBC # BLD: 8.95 K/UL — SIGNIFICANT CHANGE UP (ref 3.8–10.5)
WBC # FLD AUTO: 8.95 K/UL — SIGNIFICANT CHANGE UP (ref 3.8–10.5)

## 2021-08-19 PROCEDURE — 37243 VASC EMBOLIZE/OCCLUDE ORGAN: CPT

## 2021-08-19 PROCEDURE — 76937 US GUIDE VASCULAR ACCESS: CPT | Mod: 26

## 2021-08-19 PROCEDURE — 36247 INS CATH ABD/L-EXT ART 3RD: CPT

## 2021-08-19 PROCEDURE — 36248 INS CATH ABD/L-EXT ART ADDL: CPT | Mod: 59

## 2021-08-23 LAB — CGA FLD-MCNC: 3112 NG/ML — HIGH (ref 0–101.8)

## 2021-08-27 ENCOUNTER — OUTPATIENT (OUTPATIENT)
Dept: OUTPATIENT SERVICES | Facility: HOSPITAL | Age: 51
LOS: 1 days | Discharge: HOME | End: 2021-08-27

## 2021-08-27 DIAGNOSIS — Z90.49 ACQUIRED ABSENCE OF OTHER SPECIFIED PARTS OF DIGESTIVE TRACT: Chronic | ICD-10-CM

## 2021-08-27 DIAGNOSIS — D3A.8 OTHER BENIGN NEUROENDOCRINE TUMORS: ICD-10-CM

## 2021-08-27 DIAGNOSIS — Z98.89 OTHER SPECIFIED POSTPROCEDURAL STATES: Chronic | ICD-10-CM

## 2021-08-30 ENCOUNTER — APPOINTMENT (OUTPATIENT)
Dept: INFUSION THERAPY | Facility: HOSPITAL | Age: 51
End: 2021-08-30

## 2021-09-02 ENCOUNTER — NON-APPOINTMENT (OUTPATIENT)
Age: 51
End: 2021-09-02

## 2021-09-19 ENCOUNTER — OUTPATIENT (OUTPATIENT)
Dept: OUTPATIENT SERVICES | Facility: HOSPITAL | Age: 51
LOS: 1 days | End: 2021-09-19
Payer: COMMERCIAL

## 2021-09-19 ENCOUNTER — APPOINTMENT (OUTPATIENT)
Dept: CT IMAGING | Facility: IMAGING CENTER | Age: 51
End: 2021-09-19
Payer: COMMERCIAL

## 2021-09-19 DIAGNOSIS — Z98.89 OTHER SPECIFIED POSTPROCEDURAL STATES: Chronic | ICD-10-CM

## 2021-09-19 DIAGNOSIS — Z90.49 ACQUIRED ABSENCE OF OTHER SPECIFIED PARTS OF DIGESTIVE TRACT: Chronic | ICD-10-CM

## 2021-09-19 DIAGNOSIS — D3A.8 OTHER BENIGN NEUROENDOCRINE TUMORS: ICD-10-CM

## 2021-09-19 DIAGNOSIS — C78.7 SECONDARY MALIGNANT NEOPLASM OF LIVER AND INTRAHEPATIC BILE DUCT: ICD-10-CM

## 2021-09-19 DIAGNOSIS — Z00.8 ENCOUNTER FOR OTHER GENERAL EXAMINATION: ICD-10-CM

## 2021-09-19 PROCEDURE — 82565 ASSAY OF CREATININE: CPT

## 2021-09-19 PROCEDURE — 74170 CT ABD WO CNTRST FLWD CNTRST: CPT | Mod: 26

## 2021-09-19 PROCEDURE — 74170 CT ABD WO CNTRST FLWD CNTRST: CPT

## 2021-09-20 ENCOUNTER — LABORATORY RESULT (OUTPATIENT)
Age: 51
End: 2021-09-20

## 2021-09-20 ENCOUNTER — RESULT REVIEW (OUTPATIENT)
Age: 51
End: 2021-09-20

## 2021-09-20 ENCOUNTER — APPOINTMENT (OUTPATIENT)
Dept: HEMATOLOGY ONCOLOGY | Facility: CLINIC | Age: 51
End: 2021-09-20

## 2021-09-20 LAB
BASOPHILS # BLD AUTO: 0.07 K/UL — SIGNIFICANT CHANGE UP (ref 0–0.2)
BASOPHILS NFR BLD AUTO: 0.7 % — SIGNIFICANT CHANGE UP (ref 0–2)
EOSINOPHIL # BLD AUTO: 0.78 K/UL — HIGH (ref 0–0.5)
EOSINOPHIL NFR BLD AUTO: 8 % — HIGH (ref 0–6)
HCT VFR BLD CALC: 33.8 % — LOW (ref 34.5–45)
HGB BLD-MCNC: 11.4 G/DL — LOW (ref 11.5–15.5)
IMM GRANULOCYTES NFR BLD AUTO: 0.3 % — SIGNIFICANT CHANGE UP (ref 0–1.5)
LYMPHOCYTES # BLD AUTO: 1.31 K/UL — SIGNIFICANT CHANGE UP (ref 1–3.3)
LYMPHOCYTES # BLD AUTO: 13.4 % — SIGNIFICANT CHANGE UP (ref 13–44)
MCHC RBC-ENTMCNC: 22.4 PG — LOW (ref 27–34)
MCHC RBC-ENTMCNC: 33.7 G/DL — SIGNIFICANT CHANGE UP (ref 32–36)
MCV RBC AUTO: 66.4 FL — LOW (ref 80–100)
MONOCYTES # BLD AUTO: 0.97 K/UL — HIGH (ref 0–0.9)
MONOCYTES NFR BLD AUTO: 9.9 % — SIGNIFICANT CHANGE UP (ref 2–14)
NEUTROPHILS # BLD AUTO: 6.59 K/UL — SIGNIFICANT CHANGE UP (ref 1.8–7.4)
NEUTROPHILS NFR BLD AUTO: 67.7 % — SIGNIFICANT CHANGE UP (ref 43–77)
NRBC # BLD: 0 /100 WBCS — SIGNIFICANT CHANGE UP (ref 0–0)
PLATELET # BLD AUTO: 540 K/UL — HIGH (ref 150–400)
RBC # BLD: 5.09 M/UL — SIGNIFICANT CHANGE UP (ref 3.8–5.2)
RBC # FLD: 17.3 % — HIGH (ref 10.3–14.5)
WBC # BLD: 9.75 K/UL — SIGNIFICANT CHANGE UP (ref 3.8–10.5)
WBC # FLD AUTO: 9.75 K/UL — SIGNIFICANT CHANGE UP (ref 3.8–10.5)

## 2021-09-22 ENCOUNTER — APPOINTMENT (OUTPATIENT)
Dept: INTERVENTIONAL RADIOLOGY/VASCULAR | Facility: CLINIC | Age: 51
End: 2021-09-22
Payer: COMMERCIAL

## 2021-09-22 VITALS
RESPIRATION RATE: 18 BRPM | BODY MASS INDEX: 20.98 KG/M2 | DIASTOLIC BLOOD PRESSURE: 72 MMHG | HEART RATE: 70 BPM | SYSTOLIC BLOOD PRESSURE: 111 MMHG | OXYGEN SATURATION: 100 % | HEIGHT: 62 IN | WEIGHT: 114 LBS

## 2021-09-22 PROCEDURE — 99214 OFFICE O/P EST MOD 30 MIN: CPT

## 2021-09-22 RX ORDER — METHYLPREDNISOLONE 4 MG/1
4 TABLET ORAL
Qty: 1 | Refills: 0 | Status: COMPLETED | COMMUNITY
Start: 2021-07-30 | End: 2021-09-22

## 2021-09-22 RX ORDER — PANTOPRAZOLE 40 MG/1
40 TABLET, DELAYED RELEASE ORAL
Qty: 35 | Refills: 1 | Status: COMPLETED | COMMUNITY
Start: 2021-07-30 | End: 2021-09-22

## 2021-09-29 ENCOUNTER — OUTPATIENT (OUTPATIENT)
Dept: OUTPATIENT SERVICES | Facility: HOSPITAL | Age: 51
LOS: 1 days | Discharge: HOME | End: 2021-09-29

## 2021-09-29 DIAGNOSIS — D3A.8 OTHER BENIGN NEUROENDOCRINE TUMORS: ICD-10-CM

## 2021-09-29 DIAGNOSIS — Z90.49 ACQUIRED ABSENCE OF OTHER SPECIFIED PARTS OF DIGESTIVE TRACT: Chronic | ICD-10-CM

## 2021-09-29 DIAGNOSIS — Z98.89 OTHER SPECIFIED POSTPROCEDURAL STATES: Chronic | ICD-10-CM

## 2021-09-30 ENCOUNTER — APPOINTMENT (OUTPATIENT)
Dept: INFUSION THERAPY | Facility: HOSPITAL | Age: 51
End: 2021-09-30

## 2021-10-11 ENCOUNTER — APPOINTMENT (OUTPATIENT)
Dept: HEMATOLOGY ONCOLOGY | Facility: CLINIC | Age: 51
End: 2021-10-11
Payer: COMMERCIAL

## 2021-10-11 VITALS
SYSTOLIC BLOOD PRESSURE: 123 MMHG | RESPIRATION RATE: 16 BRPM | DIASTOLIC BLOOD PRESSURE: 73 MMHG | HEIGHT: 62 IN | HEART RATE: 63 BPM | BODY MASS INDEX: 21.1 KG/M2 | WEIGHT: 114.64 LBS | OXYGEN SATURATION: 100 % | TEMPERATURE: 97.2 F

## 2021-10-11 PROCEDURE — 99213 OFFICE O/P EST LOW 20 MIN: CPT

## 2021-10-11 NOTE — ASSESSMENT
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 CT scans in July showed stable disease. I have reviewed all the scans with the patient. She has metastatic disease, was discussed with the importance of compliance with her treatment. She feels fine, denies abdominal cramps, diarrhea. CT scans showed slightly increasing in size for her liver lesions in July 2020. She has progression of disease for her liver metastasis in March, 2021 scans. \par \par Plan\par continue lanreotide monthly injection \par will see  Dr. Armenta for potential IR liver directed therapy after COVID resolved \par check labs \par RTC in 6 weeks \par

## 2021-10-11 NOTE — HISTORY OF PRESENT ILLNESS
[Disease: _____________________] : Disease: [unfilled] [T: ___] : T[unfilled] [N: ___] : N[unfilled] [M: ___] : M[unfilled] [AJCC Stage: ____] : AJCC Stage: [unfilled] [Home] : at home, [unfilled] , at the time of the visit. [Medical Office: ([facility])___] : at the medical office located in  [de-identified] : Ms. Franklin was a new set of health still December of 2013 when she noticed some vague abdominal pain at which time she presented to the emergency room was found to have a mesenteric mass measuring 4.2 cm in greatest dimension by the aortic bifurcation. She eventually underwent a laparoscopic biopsy by Dr. Dennis Moya Singh is found to be consistent with a neuroendocrine tumor. Patient was eventually seen by a gastroenterologist Dr. Adi Patel who performed an upper endoscopy and colonoscopy that was unremarkable and was socially followed by a capsule study that revealed a mass in the jejunum patient was subsequently taken to the OR by Dr. 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  since there is no evidence of disease at that time and no evidence of metastatic disease after discussion with the patient and her presentation at tumor patient was placed on surveillance therapy off treatment.\par \par In November 2017 found to have metastatic disease and started on lanreotide\par \par July 2019 CT c/a/p showed liver metastases, unchanged to slightly increased in size compared with the prior study. 4 mm left lower lobe groundglass pulmonary nodule, unchanged dating back to the 2014 exam, most consistent with a benign etiology. \par \par In the meantime she was evaluated by Dr. Lilly at [place] for liver transplantation. The patient has been noncompliant with her lanreotide treatment. The last dose was in July, 2019, missed Meera dose. Amairani comes in for follow up while on Lanreotide and feels well overall.  She denies any abdominal pain, diarrhea. She continues to work without issues. Patient still has not had consultation with IR to discuss liver directed therapy (she requested). \par \par 1/17/2020 she feels fine, works without any issues. She denies any abdominal pain, diarrhea.  She was seen by Dr. Armenta for liver-directed therapy, has not decided when to receive the treatment. \par \par 4/24/2020 she feels fine, works without any issues. She denies any abdominal pain, diarrhea.  She was seen by Dr. Armenta for liver-directed therapy, has not decided when to receive the treatment.  [de-identified] : chromogranin and serotonin [FreeTextEntry1] : Lanreotide  [de-identified] : 6/26/2020 : Ms Franklin  joined telehealth visit today. She received somatuline on 6/22/2020. No labs were obtained that day. She describes her "is moving slowly and her food seems to me slow slowly". She becker snot have NV and does not endorse pain. \par She feels well otherwise, working.\par \par 7/11/2020 CT c/a/p showed Liver metastases, overall increased in size and number since 7/13/2019. A 0.4 cm left lower lobe groundglass nodule is unchanged.. \par \par 9/4/2020 she reports feeling overall fine, eats well, sometimes epigastric discomfort, no pain, no weight loss. \par \par 12/30/2020 she had sore throat 10 days ago, had positive COVID test on 12/26 because of her coworker positive, otherwise feels overall fine, has good appetite, denies fever, chest pain, cough, and shortness of breath. \par \par 2/24/2021 she feels overall fine. eats well, denies nausea, vomiting, abdominal pain and diarrhea. \par \par 3/14/21 CT c/a/p showed Bilobar hepatic metastatic disease with interval progression compared with prior imaging. Lesions demonstrate enhancement on arterial phase imaging\par Hepatic dome 2.8 x 2.8 cm, previously 2.2 x 1.8 cm. Segment 7 lesion 4.2 x 4.1 cm, previously 1.8 x 1.8 cm. Segment 5 lesion 3.2 x 3.2 cm, previously 2.8 x 2.5 cm. Segment 6 lesion 2.4 x 2.4 cm, previously 1.9 x 1.8 cm. He started having RUQ mild pain referring to her right shoulder, has good appetite, denies abdominal pain and diarrhea.  \par \par 9/21/21 CT abdomen and pelvis showed Right hepatic dome (3:12) 4.0 x 3.1 cm, previously 3.7 x 3.1 cm. Simple in appearance. Segment 7 lesion (3:17) 2.7 x 2.6 cm, previously 3.5 x 3.2 cm. Similar in appearance. Segment 5 lesion likely extending into segment IVb (3:37) 3.5 x 3.4 cm, previously 3.2 x 3.2 cm. Appearance and enhancement is unchanged. \par Segment 6 lesion (3:35) 4.9 x 4.7 cm, previously 4.1 x 3.7 cm. Slightly increased central low density likely reflecting treatment change.\par \par 10/11/21 s/p Y90 SIRT with Dr. Armenta. SHe reports RUQ pain when coughing, facial flushing sometimes, gasping for breath. Her weakness has been better now since local treatment. Shortly after local treatment, 3 to 4 times of watery diarrhea. She has once formed stool now.  \par

## 2021-10-11 NOTE — REVIEW OF SYSTEMS
[Negative] : Allergic/Immunologic [Shortness Of Breath] : shortness of breath [Abdominal Pain] : abdominal pain [Fatigue] : no fatigue [Recent Change In Weight] : ~T no recent weight change [Chest Pain] : no chest pain [Lower Ext Edema] : no lower extremity edema [Wheezing] : no wheezing [Cough] : no cough [SOB on Exertion] : no shortness of breath during exertion [Constipation] : no constipation [Diarrhea] : no diarrhea [FreeTextEntry7] : RUQ intermittent pain

## 2021-10-28 ENCOUNTER — OUTPATIENT (OUTPATIENT)
Dept: OUTPATIENT SERVICES | Facility: HOSPITAL | Age: 51
LOS: 1 days | Discharge: HOME | End: 2021-10-28

## 2021-10-28 DIAGNOSIS — D3A.8 OTHER BENIGN NEUROENDOCRINE TUMORS: ICD-10-CM

## 2021-10-28 DIAGNOSIS — Z98.89 OTHER SPECIFIED POSTPROCEDURAL STATES: Chronic | ICD-10-CM

## 2021-10-28 DIAGNOSIS — Z90.49 ACQUIRED ABSENCE OF OTHER SPECIFIED PARTS OF DIGESTIVE TRACT: Chronic | ICD-10-CM

## 2021-10-30 ENCOUNTER — APPOINTMENT (OUTPATIENT)
Dept: INFUSION THERAPY | Facility: HOSPITAL | Age: 51
End: 2021-10-30

## 2021-11-02 DIAGNOSIS — C7A.019 MALIGNANT CARCINOID TUMOR OF THE SMALL INTESTINE, UNSPECIFIED PORTION: ICD-10-CM

## 2021-11-02 DIAGNOSIS — C7B.02 SECONDARY CARCINOID TUMORS OF LIVER: ICD-10-CM

## 2021-11-05 ENCOUNTER — RESULT REVIEW (OUTPATIENT)
Age: 51
End: 2021-11-05

## 2021-11-05 ENCOUNTER — APPOINTMENT (OUTPATIENT)
Dept: HEMATOLOGY ONCOLOGY | Facility: CLINIC | Age: 51
End: 2021-11-05
Payer: COMMERCIAL

## 2021-11-05 LAB
BASOPHILS # BLD AUTO: 0.04 K/UL — SIGNIFICANT CHANGE UP (ref 0–0.2)
BASOPHILS NFR BLD AUTO: 0.4 % — SIGNIFICANT CHANGE UP (ref 0–2)
EOSINOPHIL # BLD AUTO: 0.41 K/UL — SIGNIFICANT CHANGE UP (ref 0–0.5)
EOSINOPHIL NFR BLD AUTO: 4.5 % — SIGNIFICANT CHANGE UP (ref 0–6)
HCT VFR BLD CALC: 33.5 % — LOW (ref 34.5–45)
HGB BLD-MCNC: 10.9 G/DL — LOW (ref 11.5–15.5)
IMM GRANULOCYTES NFR BLD AUTO: 0.3 % — SIGNIFICANT CHANGE UP (ref 0–1.5)
LYMPHOCYTES # BLD AUTO: 1.38 K/UL — SIGNIFICANT CHANGE UP (ref 1–3.3)
LYMPHOCYTES # BLD AUTO: 15.2 % — SIGNIFICANT CHANGE UP (ref 13–44)
MCHC RBC-ENTMCNC: 21.8 PG — LOW (ref 27–34)
MCHC RBC-ENTMCNC: 32.5 G/DL — SIGNIFICANT CHANGE UP (ref 32–36)
MCV RBC AUTO: 67 FL — LOW (ref 80–100)
MONOCYTES # BLD AUTO: 0.6 K/UL — SIGNIFICANT CHANGE UP (ref 0–0.9)
MONOCYTES NFR BLD AUTO: 6.6 % — SIGNIFICANT CHANGE UP (ref 2–14)
NEUTROPHILS # BLD AUTO: 6.64 K/UL — SIGNIFICANT CHANGE UP (ref 1.8–7.4)
NEUTROPHILS NFR BLD AUTO: 73 % — SIGNIFICANT CHANGE UP (ref 43–77)
NRBC # BLD: 0 /100 WBCS — SIGNIFICANT CHANGE UP (ref 0–0)
PLATELET # BLD AUTO: 699 K/UL — HIGH (ref 150–400)
RBC # BLD: 5 M/UL — SIGNIFICANT CHANGE UP (ref 3.8–5.2)
RBC # FLD: 18 % — HIGH (ref 10.3–14.5)
WBC # BLD: 9.1 K/UL — SIGNIFICANT CHANGE UP (ref 3.8–10.5)
WBC # FLD AUTO: 9.1 K/UL — SIGNIFICANT CHANGE UP (ref 3.8–10.5)

## 2021-11-05 PROCEDURE — 99212 OFFICE O/P EST SF 10 MIN: CPT

## 2021-11-05 NOTE — REVIEW OF SYSTEMS
[Shortness Of Breath] : shortness of breath [Abdominal Pain] : abdominal pain [Negative] : Allergic/Immunologic [Fatigue] : no fatigue [Recent Change In Weight] : ~T no recent weight change [Chest Pain] : no chest pain [Lower Ext Edema] : no lower extremity edema [Wheezing] : no wheezing [Cough] : no cough [SOB on Exertion] : no shortness of breath during exertion [Constipation] : no constipation [Diarrhea] : no diarrhea [FreeTextEntry7] : RUQ intermittent pain

## 2021-11-05 NOTE — HISTORY OF PRESENT ILLNESS
[Disease: _____________________] : Disease: [unfilled] [T: ___] : T[unfilled] [N: ___] : N[unfilled] [M: ___] : M[unfilled] [AJCC Stage: ____] : AJCC Stage: [unfilled] [de-identified] : Ms. Franklin was a new set of health still December of 2013 when she noticed some vague abdominal pain at which time she presented to the emergency room was found to have a mesenteric mass measuring 4.2 cm in greatest dimension by the aortic bifurcation. She eventually underwent a laparoscopic biopsy by Dr. Dennis Moya Singh is found to be consistent with a neuroendocrine tumor. Patient was eventually seen by a gastroenterologist Dr. Adi Patel who performed an upper endoscopy and colonoscopy that was unremarkable and was socially followed by a capsule study that revealed a mass in the jejunum patient was subsequently taken to the OR by Dr. 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  since there is no evidence of disease at that time and no evidence of metastatic disease after discussion with the patient and her presentation at tumor patient was placed on surveillance therapy off treatment.\par \par In November 2017 found to have metastatic disease and started on lanreotide\par \par July 2019 CT c/a/p showed liver metastases, unchanged to slightly increased in size compared with the prior study. 4 mm left lower lobe groundglass pulmonary nodule, unchanged dating back to the 2014 exam, most consistent with a benign etiology. \par \par In the meantime she was evaluated by Dr. Lilly at [place] for liver transplantation. The patient has been noncompliant with her lanreotide treatment. The last dose was in July, 2019, missed Meera dose. Amairani comes in for follow up while on Lanreotide and feels well overall.  She denies any abdominal pain, diarrhea. She continues to work without issues. Patient still has not had consultation with IR to discuss liver directed therapy (she requested). \par \par 1/17/2020 she feels fine, works without any issues. She denies any abdominal pain, diarrhea.  She was seen by Dr. Armenta for liver-directed therapy, has not decided when to receive the treatment. \par \par 4/24/2020 she feels fine, works without any issues. She denies any abdominal pain, diarrhea.  She was seen by Dr. Armenta for liver-directed therapy, has not decided when to receive the treatment.  [de-identified] : chromogranin and serotonin [FreeTextEntry1] : Lanreotide  [de-identified] : 6/26/2020 : Ms Franklin  joined telehealth visit today. She received somatuline on 6/22/2020. No labs were obtained that day. She describes her "is moving slowly and her food seems to me slow slowly". She becker snot have NV and does not endorse pain. \par She feels well otherwise, working.\par \par 7/11/2020 CT c/a/p showed Liver metastases, overall increased in size and number since 7/13/2019. A 0.4 cm left lower lobe groundglass nodule is unchanged.. \par \par 9/4/2020 she reports feeling overall fine, eats well, sometimes epigastric discomfort, no pain, no weight loss. \par \par 12/30/2020 she had sore throat 10 days ago, had positive COVID test on 12/26 because of her coworker positive, otherwise feels overall fine, has good appetite, denies fever, chest pain, cough, and shortness of breath. \par \par 2/24/2021 she feels overall fine. eats well, denies nausea, vomiting, abdominal pain and diarrhea. \par \par 3/14/21 CT c/a/p showed Bilobar hepatic metastatic disease with interval progression compared with prior imaging. Lesions demonstrate enhancement on arterial phase imaging\par Hepatic dome 2.8 x 2.8 cm, previously 2.2 x 1.8 cm. Segment 7 lesion 4.2 x 4.1 cm, previously 1.8 x 1.8 cm. Segment 5 lesion 3.2 x 3.2 cm, previously 2.8 x 2.5 cm. Segment 6 lesion 2.4 x 2.4 cm, previously 1.9 x 1.8 cm. He started having RUQ mild pain referring to her right shoulder, has good appetite, denies abdominal pain and diarrhea.  \par \par 9/21/21 CT abdomen and pelvis showed Right hepatic dome (3:12) 4.0 x 3.1 cm, previously 3.7 x 3.1 cm. Simple in appearance. Segment 7 lesion (3:17) 2.7 x 2.6 cm, previously 3.5 x 3.2 cm. Similar in appearance. Segment 5 lesion likely extending into segment IVb (3:37) 3.5 x 3.4 cm, previously 3.2 x 3.2 cm. Appearance and enhancement is unchanged. \par Segment 6 lesion (3:35) 4.9 x 4.7 cm, previously 4.1 x 3.7 cm. Slightly increased central low density likely reflecting treatment change.\par \par 10/11/21 s/p Y90 SIRT with Dr. Armenta. SHe reports RUQ pain when coughing, facial flushing sometimes, gasping for breath. Her weakness has been better now since local treatment. Shortly after local treatment, 3 to 4 times of watery diarrhea. She has once formed stool now.  \par    \par 11/5/21: Pt returns with concerns for L. supraclavicular mass enlarging. Mass became very prominent and tender. It has since gotten smaller over last 2 weeks. Denies redness and fever.

## 2021-11-05 NOTE — ASSESSMENT
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 CT scans in July showed stable disease. I have reviewed all the scans with the patient. She has metastatic disease, was discussed with the importance of compliance with her treatment. She feels fine, denies abdominal cramps, diarrhea. CT scans showed slightly increasing in size for her liver lesions in July 2020. She has progression of disease for her liver metastasis in March, 2021 scans. \par \par Plan\par continue lanreotide monthly injection \par s/p  IR liver directed therapy 8/2021\par will have CT chest/abdomen/pelvis\par check labs \par RTC in 6 weeks \par

## 2021-11-16 LAB
ALBUMIN SERPL ELPH-MCNC: 4 G/DL
ALP BLD-CCNC: 186 U/L
ALT SERPL-CCNC: 18 U/L
ANION GAP SERPL CALC-SCNC: 12 MMOL/L
AST SERPL-CCNC: 22 U/L
BILIRUB SERPL-MCNC: 0.2 MG/DL
BUN SERPL-MCNC: 9 MG/DL
CALCIUM SERPL-MCNC: 9.9 MG/DL
CGA SERPL-MCNC: 3517 NG/ML
CHLORIDE SERPL-SCNC: 100 MMOL/L
CO2 SERPL-SCNC: 28 MMOL/L
CREAT SERPL-MCNC: 0.67 MG/DL
GLUCOSE SERPL-MCNC: 140 MG/DL
POTASSIUM SERPL-SCNC: 4.1 MMOL/L
PROT SERPL-MCNC: 7.4 G/DL
SEROTONIN SERUM: 667 NG/ML
SODIUM SERPL-SCNC: 140 MMOL/L

## 2021-11-27 ENCOUNTER — OUTPATIENT (OUTPATIENT)
Dept: OUTPATIENT SERVICES | Facility: HOSPITAL | Age: 51
LOS: 1 days | Discharge: HOME | End: 2021-11-27

## 2021-11-27 DIAGNOSIS — Z90.49 ACQUIRED ABSENCE OF OTHER SPECIFIED PARTS OF DIGESTIVE TRACT: Chronic | ICD-10-CM

## 2021-11-27 DIAGNOSIS — C7A.019 MALIGNANT CARCINOID TUMOR OF THE SMALL INTESTINE, UNSPECIFIED PORTION: ICD-10-CM

## 2021-11-27 DIAGNOSIS — C7B.02 SECONDARY CARCINOID TUMORS OF LIVER: ICD-10-CM

## 2021-11-27 DIAGNOSIS — Z98.89 OTHER SPECIFIED POSTPROCEDURAL STATES: Chronic | ICD-10-CM

## 2021-11-30 ENCOUNTER — APPOINTMENT (OUTPATIENT)
Dept: INFUSION THERAPY | Facility: HOSPITAL | Age: 51
End: 2021-11-30

## 2021-12-04 ENCOUNTER — OUTPATIENT (OUTPATIENT)
Dept: OUTPATIENT SERVICES | Facility: HOSPITAL | Age: 51
LOS: 1 days | End: 2021-12-04
Payer: COMMERCIAL

## 2021-12-04 ENCOUNTER — APPOINTMENT (OUTPATIENT)
Dept: CT IMAGING | Facility: IMAGING CENTER | Age: 51
End: 2021-12-04
Payer: COMMERCIAL

## 2021-12-04 DIAGNOSIS — Z90.49 ACQUIRED ABSENCE OF OTHER SPECIFIED PARTS OF DIGESTIVE TRACT: Chronic | ICD-10-CM

## 2021-12-04 DIAGNOSIS — Z00.8 ENCOUNTER FOR OTHER GENERAL EXAMINATION: ICD-10-CM

## 2021-12-04 DIAGNOSIS — Z98.89 OTHER SPECIFIED POSTPROCEDURAL STATES: Chronic | ICD-10-CM

## 2021-12-04 DIAGNOSIS — C78.7 SECONDARY MALIGNANT NEOPLASM OF LIVER AND INTRAHEPATIC BILE DUCT: ICD-10-CM

## 2021-12-04 PROCEDURE — 71260 CT THORAX DX C+: CPT

## 2021-12-04 PROCEDURE — 71260 CT THORAX DX C+: CPT | Mod: 26

## 2021-12-04 PROCEDURE — 74170 CT ABD WO CNTRST FLWD CNTRST: CPT | Mod: 26

## 2021-12-04 PROCEDURE — 74170 CT ABD WO CNTRST FLWD CNTRST: CPT

## 2021-12-17 ENCOUNTER — APPOINTMENT (OUTPATIENT)
Dept: HEMATOLOGY ONCOLOGY | Facility: CLINIC | Age: 51
End: 2021-12-17
Payer: COMMERCIAL

## 2021-12-17 VITALS
RESPIRATION RATE: 16 BRPM | WEIGHT: 112.41 LBS | OXYGEN SATURATION: 99 % | TEMPERATURE: 97.3 F | BODY MASS INDEX: 20.56 KG/M2 | HEART RATE: 85 BPM | DIASTOLIC BLOOD PRESSURE: 76 MMHG | SYSTOLIC BLOOD PRESSURE: 113 MMHG

## 2021-12-17 DIAGNOSIS — K63.89 OTHER SPECIFIED DISEASES OF INTESTINE: ICD-10-CM

## 2021-12-17 PROCEDURE — 99214 OFFICE O/P EST MOD 30 MIN: CPT

## 2021-12-17 NOTE — HISTORY OF PRESENT ILLNESS
[Disease: _____________________] : Disease: [unfilled] [T: ___] : T[unfilled] [N: ___] : N[unfilled] [M: ___] : M[unfilled] [AJCC Stage: ____] : AJCC Stage: [unfilled] [de-identified] : Ms. Franklin was a new set of health still December of 2013 when she noticed some vague abdominal pain at which time she presented to the emergency room was found to have a mesenteric mass measuring 4.2 cm in greatest dimension by the aortic bifurcation. She eventually underwent a laparoscopic biopsy by Dr. Dennis Moya Singh is found to be consistent with a neuroendocrine tumor. Patient was eventually seen by a gastroenterologist Dr. Adi Patel who performed an upper endoscopy and colonoscopy that was unremarkable and was socially followed by a capsule study that revealed a mass in the jejunum patient was subsequently taken to the OR by Dr. 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  since there is no evidence of disease at that time and no evidence of metastatic disease after discussion with the patient and her presentation at tumor patient was placed on surveillance therapy off treatment.\par \par In November 2017 found to have metastatic disease and started on lanreotide\par \par July 2019 CT c/a/p showed liver metastases, unchanged to slightly increased in size compared with the prior study. 4 mm left lower lobe groundglass pulmonary nodule, unchanged dating back to the 2014 exam, most consistent with a benign etiology. \par \par In the meantime she was evaluated by Dr. Lilly at [place] for liver transplantation. The patient has been noncompliant with her lanreotide treatment. The last dose was in July, 2019, missed Meera dose. Amairani comes in for follow up while on Lanreotide and feels well overall.  She denies any abdominal pain, diarrhea. She continues to work without issues. Patient still has not had consultation with IR to discuss liver directed therapy (she requested). \par \par 1/17/2020 she feels fine, works without any issues. She denies any abdominal pain, diarrhea.  She was seen by Dr. Armenta for liver-directed therapy, has not decided when to receive the treatment. \par \par 4/24/2020 she feels fine, works without any issues. She denies any abdominal pain, diarrhea.  She was seen by Dr. Armenta for liver-directed therapy, has not decided when to receive the treatment.  [de-identified] : chromogranin and serotonin [FreeTextEntry1] : Lanreotide  [de-identified] : 6/26/2020 : Ms Franklin  joined telehealth visit today. She received somatuline on 6/22/2020. No labs were obtained that day. She describes her "is moving slowly and her food seems to me slow slowly". She becker snot have NV and does not endorse pain. \par She feels well otherwise, working.\par \par 7/11/2020 CT c/a/p showed Liver metastases, overall increased in size and number since 7/13/2019. A 0.4 cm left lower lobe groundglass nodule is unchanged.. \par \par 9/4/2020 she reports feeling overall fine, eats well, sometimes epigastric discomfort, no pain, no weight loss. \par \par 12/30/2020 she had sore throat 10 days ago, had positive COVID test on 12/26 because of her coworker positive, otherwise feels overall fine, has good appetite, denies fever, chest pain, cough, and shortness of breath. \par \par 2/24/2021 she feels overall fine. eats well, denies nausea, vomiting, abdominal pain and diarrhea. \par \par 3/14/21 CT c/a/p showed Bilobar hepatic metastatic disease with interval progression compared with prior imaging. Lesions demonstrate enhancement on arterial phase imaging\par Hepatic dome 2.8 x 2.8 cm, previously 2.2 x 1.8 cm. Segment 7 lesion 4.2 x 4.1 cm, previously 1.8 x 1.8 cm. Segment 5 lesion 3.2 x 3.2 cm, previously 2.8 x 2.5 cm. Segment 6 lesion 2.4 x 2.4 cm, previously 1.9 x 1.8 cm. He started having RUQ mild pain referring to her right shoulder, has good appetite, denies abdominal pain and diarrhea.  \par \par 9/21/21 CT abdomen and pelvis showed Right hepatic dome (3:12) 4.0 x 3.1 cm, previously 3.7 x 3.1 cm. Simple in appearance. Segment 7 lesion (3:17) 2.7 x 2.6 cm, previously 3.5 x 3.2 cm. Similar in appearance. Segment 5 lesion likely extending into segment IVb (3:37) 3.5 x 3.4 cm, previously 3.2 x 3.2 cm. Appearance and enhancement is unchanged. \par Segment 6 lesion (3:35) 4.9 x 4.7 cm, previously 4.1 x 3.7 cm. Slightly increased central low density likely reflecting treatment change.\par \par 10/11/21 s/p Y90 SIRT with Dr. Armenta. SHe reports RUQ pain when coughing, facial flushing sometimes, gasping for breath. Her weakness has been better now since local treatment. Shortly after local treatment, 3 to 4 times of watery diarrhea. She has once formed stool now.  \par    \par 11/5/21: Pt returns with concerns for L. supraclavicular mass enlarging. Mass became very prominent and tender. It has since gotten smaller over last 2 weeks. Denies redness and fever. \par \par 12/4/21 CT c/a/p showed Bilobar hepatic neoplasm. Interval increase in size and number of left hepatic lobe lesions. Most of right hepatic lobe disease is not significantly changed in size with exception of segment 6 lesions which appear to have coalesced with concern for progression.\par \par 12/17/21 she reports worsening RUQ pain, sometimes radiating to the shoulder and back, loss of appetite and weight, 3 to 4 times of watery diarrhea.

## 2021-12-17 NOTE — REVIEW OF SYSTEMS
[Shortness Of Breath] : shortness of breath [Abdominal Pain] : abdominal pain [Negative] : Allergic/Immunologic [Fatigue] : fatigue [Recent Change In Weight] : ~T recent weight change [Diarrhea] : diarrhea [Fever] : no fever [Chills] : no chills [Chest Pain] : no chest pain [Lower Ext Edema] : no lower extremity edema [Wheezing] : no wheezing [Cough] : no cough [SOB on Exertion] : no shortness of breath during exertion [Constipation] : no constipation [FreeTextEntry7] : RUQ intermittent pain

## 2021-12-17 NOTE — ASSESSMENT
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 CT scans in July showed stable disease. I have reviewed all the scans with the patient. She has metastatic disease, was discussed with the importance of compliance with her treatment. She feels fine, denies abdominal cramps, diarrhea. CT scans showed slightly increasing in size for her liver lesions in July 2020. She has progression of disease for her liver metastasis in March, 2021 scans. Dec 4 2021 CT scans showed progression of disease. \par \par Plan\par continue lanreotide monthly injection \par had a lengthy discussion regarding next treatment for PRRT 177 Jyotsna dotatate. Will have dotatate PET CT now to see positive disease prior to treatment. She also needs to stop lanreotide 4 to 6 weeks prior to treatment. AEs of this nucleotide include but are not limited to myelosuppression, secondary MDS and leukemia, renal, and hepatic toxicity. Alternatively everolimus 5 mg daily, AEs include but are not limited to fatigue, edema, hypertension, hyperglycemia, hypercholerolemia, nausea, vomiting, constipation vs diarrhea, skin rashes. She will think about it and decide which one she will choose\par dotatate PET CT ordered\par s/p  IR liver directed therapy 8/2021\par check labs \par RTC in 4 weeks \par

## 2021-12-25 ENCOUNTER — OUTPATIENT (OUTPATIENT)
Dept: OUTPATIENT SERVICES | Facility: HOSPITAL | Age: 51
LOS: 1 days | Discharge: HOME | End: 2021-12-25

## 2021-12-25 DIAGNOSIS — Z98.89 OTHER SPECIFIED POSTPROCEDURAL STATES: Chronic | ICD-10-CM

## 2021-12-25 DIAGNOSIS — C7B.02 SECONDARY CARCINOID TUMORS OF LIVER: ICD-10-CM

## 2021-12-25 DIAGNOSIS — Z90.49 ACQUIRED ABSENCE OF OTHER SPECIFIED PARTS OF DIGESTIVE TRACT: Chronic | ICD-10-CM

## 2021-12-25 DIAGNOSIS — C7A.019 MALIGNANT CARCINOID TUMOR OF THE SMALL INTESTINE, UNSPECIFIED PORTION: ICD-10-CM

## 2021-12-29 ENCOUNTER — APPOINTMENT (OUTPATIENT)
Dept: INTERVENTIONAL RADIOLOGY/VASCULAR | Facility: CLINIC | Age: 51
End: 2021-12-29
Payer: COMMERCIAL

## 2021-12-29 ENCOUNTER — APPOINTMENT (OUTPATIENT)
Dept: INFUSION THERAPY | Facility: HOSPITAL | Age: 51
End: 2021-12-29

## 2021-12-29 VITALS
OXYGEN SATURATION: 100 % | BODY MASS INDEX: 20.06 KG/M2 | RESPIRATION RATE: 16 BRPM | WEIGHT: 109 LBS | DIASTOLIC BLOOD PRESSURE: 76 MMHG | HEIGHT: 62 IN | SYSTOLIC BLOOD PRESSURE: 123 MMHG | HEART RATE: 70 BPM

## 2021-12-29 PROCEDURE — 99214 OFFICE O/P EST MOD 30 MIN: CPT

## 2021-12-29 NOTE — HISTORY OF PRESENT ILLNESS
[FreeTextEntry1] : 51 years old female with hx of neuroendocrine ca diagnosed in 2013. 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 Since there is no evidence of disease at that time and no evidence of metastatic disease after discussion with the patient and her presentation at tumor patient was placed on surveillance therapy off treatment.\par \par In November 2017 found to have metastatic disease. s/p ex-lap NEELAM for SBO. She was started on lanreotide. \par \par \par July 2019 CT c/a/p showed liver metastases, unchanged to slightly increased in size compared with the prior study. 4 mm left lower lobe ground glass pulmonary nodule, unchanged dating back to the 2014 exam, most consistent with a benign etiology. \par \par She was also evaluated at [facility] for liver transplant in 2019. But she stated she is not a candidate for liver transplant. \par \par Patient was seen in office back in 2019 and at that time patient stated she had a MRI done at [facility] on Nov 5th 2019  but does not have any records (CD or report with her). \par \par In the mean time patient has been following up with Dr. Whitmore. She had a f/u  CT scan done in March 2021 which demonstrated, “Bilobar hepatic metastatic disease with interval progression compared with prior imaging. Lesions demonstrate enhancement on arterial phase imaging”. \par \par Patient is now being referred back to Dr. Armenta by Dr. Whitmore to discuss LDT options. \par Patient sttes in April she was feeling tired, decreased appetite right shoulder right abd pain which she states resolved on its own. She states she has been feeling better since the past two weeks. \par \par Patient sates she has been feeling well overall. Appetite has been good no unintentional weight loss.\par \par Patient states she has some facial flushing and occasion flushing in her hands. Denies having any diarrhea. \par \par Denies any recent SOB, CP, fever, chills, n/v/d. Denies abd pain.\par \par Patient states her last treatment of lanreotide was on 05/27/21. \par \par \par Patient did not get the COVID vaccine yet. \par \par Disease: low-grade small bowel neuroendocrine tumor. \par TNM stage: T4m, N1, M1 \par AJCC Stage: IV \par Tumor Markers: chromogranin and serotonin \par \par \par INTERVAL HISTORY\par pt is s/p MAA on 8/12/21. pt called the office yesterday and stated she has a"lump" in her right groin around the incision site. Complains of having right groin discomfort. Advised pt to come to the office fro assessment and follow up. pt presetned to have her right groin assessed. pt noted to hard, raised area. pt advised to have right groin US done to r/o pseudoaneurysm. \par \par INTERVAL HISTORY \par pt is s/p SIRT on 8/19/21 pt had follow up scan and labs done and presents to the office for follow up. pt states overall post procedure she has been feeling well.\par \par pt reports that she has been feeling fatigue / tired since the procedure. Denies having any abdominal pain n/v.\par pt is on lanreotide injection \par \par Right groin incision is healed denies having any pain. \par \par INTERVAL HISTORY 12/29/21\par She is s/p SIRT 8/19/21 and has been on surveillance scans and had Bilobar hepatic neoplasm.\par \par Interval increase in size and number of left hepatic lobe lesions.\par \par Most of right hepatic lobe disease is not significantly changed in size with exception of segment 6 lesions which appear to have coalesced with concern for progression.\par \par states her appetite is fair she has good days and bad days she is continues to feel weak. ON certain days she gets tired very easily. 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

## 2021-12-29 NOTE — PHYSICAL EXAM
[Alert] : alert [Normal Sclera/Conjunctiva] : normal sclera/conjunctiva [Normal Hearing] : hearing was normal [No Respiratory Distress] : no respiratory distress [No Accessory Muscle Use] : no accessory muscle use [Normal Gait] : normal gait [No Tremors] : no tremors [Oriented x3] : oriented to person, place, and time [de-identified] : b/l groin no redness mositure noted +2 femoral pulses. Barbeau test performed type A

## 2021-12-29 NOTE — REVIEW OF SYSTEMS
[Fever] : no fever [Chills] : no chills [Eyesight Problems] : no eyesight problems [Chest Pain] : no chest pain [Palpitations] : no palpitations [Cough] : no cough [Easy Bleeding] : no tendency for easy bleeding [Easy Bruising] : no tendency for easy bruising

## 2021-12-29 NOTE — ASSESSMENT
[FreeTextEntry1] : F/U post right hepatic lobe SIRT 8/19/21.\par \par C/O fatigue.\par \par Chromogranin:\par 8/19/21 3112 day of SIRT\par 9/20/21 2698\par 11/5/21 3517\par \par F/U CT 12/04/21: mostly stable disease within the right hepatic lobe, segment 6 and left hepatic lobe disease progression.\par \par Assessment:\par Patient may benefit from bland embolization as the TARE had a limited response.\par \par [place] Embolization was discussed with the patient.\par She would like to think about treatment options and will contact IR office if decides to proceed with embolization of the liver masses.

## 2021-12-29 NOTE — CONSULT LETTER
[Dear  ___] : Dear  [unfilled], [Courtesy Letter:] : I had the pleasure of seeing your patient, [unfilled], in my office today. [Please see my note below.] : Please see my note below. [Consult Closing:] : Thank you very much for allowing me to participate in the care of this patient.  If you have any questions, please do not hesitate to contact me. [Sincerely,] : Sincerely, [FreeTextEntry2] : Dr. Darlene Whitmore

## 2022-01-01 ENCOUNTER — APPOINTMENT (OUTPATIENT)
Dept: NUCLEAR MEDICINE | Facility: HOSPITAL | Age: 52
End: 2022-01-01

## 2022-01-01 ENCOUNTER — NON-APPOINTMENT (OUTPATIENT)
Age: 52
End: 2022-01-01

## 2022-01-01 ENCOUNTER — RESULT REVIEW (OUTPATIENT)
Age: 52
End: 2022-01-01

## 2022-01-01 ENCOUNTER — OUTPATIENT (OUTPATIENT)
Dept: OUTPATIENT SERVICES | Facility: HOSPITAL | Age: 52
LOS: 1 days | End: 2022-01-01
Payer: COMMERCIAL

## 2022-01-01 ENCOUNTER — OUTPATIENT (OUTPATIENT)
Dept: OUTPATIENT SERVICES | Facility: HOSPITAL | Age: 52
LOS: 1 days | Discharge: HOME | End: 2022-01-01

## 2022-01-01 ENCOUNTER — APPOINTMENT (OUTPATIENT)
Dept: INFUSION THERAPY | Facility: HOSPITAL | Age: 52
End: 2022-01-01

## 2022-01-01 DIAGNOSIS — Z98.89 OTHER SPECIFIED POSTPROCEDURAL STATES: Chronic | ICD-10-CM

## 2022-01-01 DIAGNOSIS — C7B.02 SECONDARY CARCINOID TUMORS OF LIVER: ICD-10-CM

## 2022-01-01 DIAGNOSIS — Z90.49 ACQUIRED ABSENCE OF OTHER SPECIFIED PARTS OF DIGESTIVE TRACT: Chronic | ICD-10-CM

## 2022-01-01 PROCEDURE — 79101 NUCLEAR RX IV ADMIN: CPT

## 2022-01-01 PROCEDURE — 79101 NUCLEAR RX IV ADMIN: CPT | Mod: 26

## 2022-01-01 PROCEDURE — A9513: CPT

## 2022-01-01 PROCEDURE — 84132 ASSAY OF SERUM POTASSIUM: CPT

## 2022-01-01 PROCEDURE — 36415 COLL VENOUS BLD VENIPUNCTURE: CPT

## 2022-01-07 ENCOUNTER — NON-APPOINTMENT (OUTPATIENT)
Age: 52
End: 2022-01-07

## 2022-01-28 ENCOUNTER — OUTPATIENT (OUTPATIENT)
Dept: OUTPATIENT SERVICES | Facility: HOSPITAL | Age: 52
LOS: 1 days | Discharge: HOME | End: 2022-01-28

## 2022-01-28 DIAGNOSIS — Z90.49 ACQUIRED ABSENCE OF OTHER SPECIFIED PARTS OF DIGESTIVE TRACT: Chronic | ICD-10-CM

## 2022-01-28 DIAGNOSIS — Z98.89 OTHER SPECIFIED POSTPROCEDURAL STATES: Chronic | ICD-10-CM

## 2022-01-28 DIAGNOSIS — C7B.02 SECONDARY CARCINOID TUMORS OF LIVER: ICD-10-CM

## 2022-01-31 ENCOUNTER — APPOINTMENT (OUTPATIENT)
Dept: INFUSION THERAPY | Facility: HOSPITAL | Age: 52
End: 2022-01-31

## 2022-01-31 DIAGNOSIS — C7A.019 MALIGNANT CARCINOID TUMOR OF THE SMALL INTESTINE, UNSPECIFIED PORTION: ICD-10-CM

## 2022-02-24 ENCOUNTER — OUTPATIENT (OUTPATIENT)
Dept: OUTPATIENT SERVICES | Facility: HOSPITAL | Age: 52
LOS: 1 days | Discharge: HOME | End: 2022-02-24

## 2022-02-24 DIAGNOSIS — Z98.89 OTHER SPECIFIED POSTPROCEDURAL STATES: Chronic | ICD-10-CM

## 2022-02-24 DIAGNOSIS — C7B.02 SECONDARY CARCINOID TUMORS OF LIVER: ICD-10-CM

## 2022-02-24 DIAGNOSIS — Z90.49 ACQUIRED ABSENCE OF OTHER SPECIFIED PARTS OF DIGESTIVE TRACT: Chronic | ICD-10-CM

## 2022-02-28 ENCOUNTER — APPOINTMENT (OUTPATIENT)
Dept: INFUSION THERAPY | Facility: HOSPITAL | Age: 52
End: 2022-02-28

## 2022-03-12 ENCOUNTER — OUTPATIENT (OUTPATIENT)
Dept: OUTPATIENT SERVICES | Facility: HOSPITAL | Age: 52
LOS: 1 days | End: 2022-03-12
Payer: COMMERCIAL

## 2022-03-12 ENCOUNTER — APPOINTMENT (OUTPATIENT)
Dept: CT IMAGING | Facility: IMAGING CENTER | Age: 52
End: 2022-03-12
Payer: COMMERCIAL

## 2022-03-12 DIAGNOSIS — D3A.8 OTHER BENIGN NEUROENDOCRINE TUMORS: ICD-10-CM

## 2022-03-12 DIAGNOSIS — Z98.89 OTHER SPECIFIED POSTPROCEDURAL STATES: Chronic | ICD-10-CM

## 2022-03-12 DIAGNOSIS — Z00.8 ENCOUNTER FOR OTHER GENERAL EXAMINATION: ICD-10-CM

## 2022-03-12 DIAGNOSIS — Z90.49 ACQUIRED ABSENCE OF OTHER SPECIFIED PARTS OF DIGESTIVE TRACT: Chronic | ICD-10-CM

## 2022-03-12 PROCEDURE — 82565 ASSAY OF CREATININE: CPT

## 2022-03-12 PROCEDURE — 71260 CT THORAX DX C+: CPT

## 2022-03-12 PROCEDURE — 71260 CT THORAX DX C+: CPT | Mod: 26

## 2022-03-12 PROCEDURE — 74177 CT ABD & PELVIS W/CONTRAST: CPT | Mod: 26

## 2022-03-12 PROCEDURE — 74177 CT ABD & PELVIS W/CONTRAST: CPT

## 2022-03-27 ENCOUNTER — OUTPATIENT (OUTPATIENT)
Dept: OUTPATIENT SERVICES | Facility: HOSPITAL | Age: 52
LOS: 1 days | Discharge: HOME | End: 2022-03-27

## 2022-03-27 DIAGNOSIS — Z90.49 ACQUIRED ABSENCE OF OTHER SPECIFIED PARTS OF DIGESTIVE TRACT: Chronic | ICD-10-CM

## 2022-03-27 DIAGNOSIS — C7A.019 MALIGNANT CARCINOID TUMOR OF THE SMALL INTESTINE, UNSPECIFIED PORTION: ICD-10-CM

## 2022-03-27 DIAGNOSIS — Z98.89 OTHER SPECIFIED POSTPROCEDURAL STATES: Chronic | ICD-10-CM

## 2022-03-27 DIAGNOSIS — C7B.02 SECONDARY CARCINOID TUMORS OF LIVER: ICD-10-CM

## 2022-03-28 ENCOUNTER — RESULT REVIEW (OUTPATIENT)
Age: 52
End: 2022-03-28

## 2022-03-29 ENCOUNTER — OUTPATIENT (OUTPATIENT)
Dept: OUTPATIENT SERVICES | Facility: HOSPITAL | Age: 52
LOS: 1 days | End: 2022-03-29
Payer: COMMERCIAL

## 2022-03-29 ENCOUNTER — APPOINTMENT (OUTPATIENT)
Dept: NUCLEAR MEDICINE | Facility: IMAGING CENTER | Age: 52
End: 2022-03-29
Payer: COMMERCIAL

## 2022-03-29 DIAGNOSIS — Z00.8 ENCOUNTER FOR OTHER GENERAL EXAMINATION: ICD-10-CM

## 2022-03-29 DIAGNOSIS — Z98.89 OTHER SPECIFIED POSTPROCEDURAL STATES: Chronic | ICD-10-CM

## 2022-03-29 DIAGNOSIS — Z90.49 ACQUIRED ABSENCE OF OTHER SPECIFIED PARTS OF DIGESTIVE TRACT: Chronic | ICD-10-CM

## 2022-03-29 DIAGNOSIS — K63.89 OTHER SPECIFIED DISEASES OF INTESTINE: ICD-10-CM

## 2022-03-29 PROCEDURE — 78815 PET IMAGE W/CT SKULL-THIGH: CPT

## 2022-03-29 PROCEDURE — 78815 PET IMAGE W/CT SKULL-THIGH: CPT | Mod: 26,PS

## 2022-03-29 PROCEDURE — A9592: CPT

## 2022-03-30 ENCOUNTER — APPOINTMENT (OUTPATIENT)
Dept: INFUSION THERAPY | Facility: HOSPITAL | Age: 52
End: 2022-03-30

## 2022-04-04 ENCOUNTER — RESULT REVIEW (OUTPATIENT)
Age: 52
End: 2022-04-04

## 2022-04-04 ENCOUNTER — APPOINTMENT (OUTPATIENT)
Dept: HEMATOLOGY ONCOLOGY | Facility: CLINIC | Age: 52
End: 2022-04-04
Payer: COMMERCIAL

## 2022-04-04 VITALS
BODY MASS INDEX: 18.23 KG/M2 | HEART RATE: 84 BPM | TEMPERATURE: 97.1 F | RESPIRATION RATE: 16 BRPM | SYSTOLIC BLOOD PRESSURE: 99 MMHG | OXYGEN SATURATION: 99 % | DIASTOLIC BLOOD PRESSURE: 57 MMHG | WEIGHT: 99.65 LBS

## 2022-04-04 LAB
BASOPHILS # BLD AUTO: 0.07 K/UL — SIGNIFICANT CHANGE UP (ref 0–0.2)
BASOPHILS NFR BLD AUTO: 0.5 % — SIGNIFICANT CHANGE UP (ref 0–2)
EOSINOPHIL # BLD AUTO: 0.4 K/UL — SIGNIFICANT CHANGE UP (ref 0–0.5)
EOSINOPHIL NFR BLD AUTO: 2.8 % — SIGNIFICANT CHANGE UP (ref 0–6)
HCT VFR BLD CALC: 29.6 % — LOW (ref 34.5–45)
HGB BLD-MCNC: 9.7 G/DL — LOW (ref 11.5–15.5)
IMM GRANULOCYTES NFR BLD AUTO: 0.4 % — SIGNIFICANT CHANGE UP (ref 0–1.5)
LYMPHOCYTES # BLD AUTO: 14 % — SIGNIFICANT CHANGE UP (ref 13–44)
LYMPHOCYTES # BLD AUTO: 2.01 K/UL — SIGNIFICANT CHANGE UP (ref 1–3.3)
MCHC RBC-ENTMCNC: 20.6 PG — LOW (ref 27–34)
MCHC RBC-ENTMCNC: 32.8 G/DL — SIGNIFICANT CHANGE UP (ref 32–36)
MCV RBC AUTO: 63 FL — LOW (ref 80–100)
MONOCYTES # BLD AUTO: 0.97 K/UL — HIGH (ref 0–0.9)
MONOCYTES NFR BLD AUTO: 6.8 % — SIGNIFICANT CHANGE UP (ref 2–14)
NEUTROPHILS # BLD AUTO: 10.81 K/UL — HIGH (ref 1.8–7.4)
NEUTROPHILS NFR BLD AUTO: 75.5 % — SIGNIFICANT CHANGE UP (ref 43–77)
NRBC # BLD: 0 /100 WBCS — SIGNIFICANT CHANGE UP (ref 0–0)
PLATELET # BLD AUTO: 615 K/UL — HIGH (ref 150–400)
RBC # BLD: 4.7 M/UL — SIGNIFICANT CHANGE UP (ref 3.8–5.2)
RBC # FLD: 18.9 % — HIGH (ref 10.3–14.5)
WBC # BLD: 14.32 K/UL — HIGH (ref 3.8–10.5)
WBC # FLD AUTO: 14.32 K/UL — HIGH (ref 3.8–10.5)

## 2022-04-04 PROCEDURE — 99214 OFFICE O/P EST MOD 30 MIN: CPT

## 2022-04-05 NOTE — REVIEW OF SYSTEMS
[Fatigue] : fatigue [Recent Change In Weight] : ~T recent weight change [Shortness Of Breath] : shortness of breath [Abdominal Pain] : abdominal pain [Diarrhea] : diarrhea [Negative] : Allergic/Immunologic [Fever] : no fever [Chills] : no chills [Chest Pain] : no chest pain [Lower Ext Edema] : no lower extremity edema [Wheezing] : no wheezing [Cough] : no cough [SOB on Exertion] : no shortness of breath during exertion [Constipation] : no constipation [FreeTextEntry7] : RUQ intermittent pain

## 2022-04-05 NOTE — HISTORY OF PRESENT ILLNESS
[Disease: _____________________] : Disease: [unfilled] [T: ___] : T[unfilled] [N: ___] : N[unfilled] [M: ___] : M[unfilled] [AJCC Stage: ____] : AJCC Stage: [unfilled] [de-identified] : Ms. Franklin was a new set of health still December of 2013 when she noticed some vague abdominal pain at which time she presented to the emergency room was found to have a mesenteric mass measuring 4.2 cm in greatest dimension by the aortic bifurcation. She eventually underwent a laparoscopic biopsy by Dr. Dennis Moya Singh is found to be consistent with a neuroendocrine tumor. Patient was eventually seen by a gastroenterologist Dr. Adi Patel who performed an upper endoscopy and colonoscopy that was unremarkable and was socially followed by a capsule study that revealed a mass in the jejunum patient was subsequently taken to the OR by Dr. 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  since there is no evidence of disease at that time and no evidence of metastatic disease after discussion with the patient and her presentation at tumor patient was placed on surveillance therapy off treatment.\par \par In November 2017 found to have metastatic disease and started on lanreotide\par \par July 2019 CT c/a/p showed liver metastases, unchanged to slightly increased in size compared with the prior study. 4 mm left lower lobe groundglass pulmonary nodule, unchanged dating back to the 2014 exam, most consistent with a benign etiology. \par \par In the meantime she was evaluated by Dr. Lilly at [place] for liver transplantation. The patient has been noncompliant with her lanreotide treatment. The last dose was in July, 2019, missed Meera dose. Amairani comes in for follow up while on Lanreotide and feels well overall.  She denies any abdominal pain, diarrhea. She continues to work without issues. Patient still has not had consultation with IR to discuss liver directed therapy (she requested). \par \par 1/17/2020 she feels fine, works without any issues. She denies any abdominal pain, diarrhea.  She was seen by Dr. Armenta for liver-directed therapy, has not decided when to receive the treatment. \par \par 4/24/2020 she feels fine, works without any issues. She denies any abdominal pain, diarrhea.  She was seen by Dr. Armenta for liver-directed therapy, has not decided when to receive the treatment.  [de-identified] : chromogranin and serotonin [FreeTextEntry1] : Lanreotide  [de-identified] : 6/26/2020 : Ms Franklin  joined telehealth visit today. She received somatuline on 6/22/2020. No labs were obtained that day. She describes her "is moving slowly and her food seems to me slow slowly". She becker snot have NV and does not endorse pain. \par She feels well otherwise, working.\par \par 7/11/2020 CT c/a/p showed Liver metastases, overall increased in size and number since 7/13/2019. A 0.4 cm left lower lobe groundglass nodule is unchanged.. \par \par 9/4/2020 she reports feeling overall fine, eats well, sometimes epigastric discomfort, no pain, no weight loss. \par \par 12/30/2020 she had sore throat 10 days ago, had positive COVID test on 12/26 because of her coworker positive, otherwise feels overall fine, has good appetite, denies fever, chest pain, cough, and shortness of breath. \par \par 2/24/2021 she feels overall fine. eats well, denies nausea, vomiting, abdominal pain and diarrhea. \par \par 3/14/21 CT c/a/p showed Bilobar hepatic metastatic disease with interval progression compared with prior imaging. Lesions demonstrate enhancement on arterial phase imaging\par Hepatic dome 2.8 x 2.8 cm, previously 2.2 x 1.8 cm. Segment 7 lesion 4.2 x 4.1 cm, previously 1.8 x 1.8 cm. Segment 5 lesion 3.2 x 3.2 cm, previously 2.8 x 2.5 cm. Segment 6 lesion 2.4 x 2.4 cm, previously 1.9 x 1.8 cm. He started having RUQ mild pain referring to her right shoulder, has good appetite, denies abdominal pain and diarrhea.  \par \par 9/21/21 CT abdomen and pelvis showed Right hepatic dome (3:12) 4.0 x 3.1 cm, previously 3.7 x 3.1 cm. Simple in appearance. Segment 7 lesion (3:17) 2.7 x 2.6 cm, previously 3.5 x 3.2 cm. Similar in appearance. Segment 5 lesion likely extending into segment IVb (3:37) 3.5 x 3.4 cm, previously 3.2 x 3.2 cm. Appearance and enhancement is unchanged. \par Segment 6 lesion (3:35) 4.9 x 4.7 cm, previously 4.1 x 3.7 cm. Slightly increased central low density likely reflecting treatment change.\par \par 10/11/21 s/p Y90 SIRT with Dr. Armenta. SHe reports RUQ pain when coughing, facial flushing sometimes, gasping for breath. Her weakness has been better now since local treatment. Shortly after local treatment, 3 to 4 times of watery diarrhea. She has once formed stool now.  \par    \par 11/5/21: Pt returns with concerns for L. supraclavicular mass enlarging. Mass became very prominent and tender. It has since gotten smaller over last 2 weeks. Denies redness and fever. \par \par 12/4/21 CT c/a/p showed Bilobar hepatic neoplasm. Interval increase in size and number of left hepatic lobe lesions. Most of right hepatic lobe disease is not significantly changed in size with exception of segment 6 lesions which appear to have coalesced with concern for progression.\par \par 12/17/21 she reports worsening RUQ pain, sometimes radiating to the shoulder and back, loss of appetite and weight, 3 to 4 times of watery diarrhea. \par \par 3/12/22 CT c/a/p showed Interval progression of disease in the liver with increase in size and number of lesions. No evidence of metastatic disease in the chest.\par \par 3/29/22 Dotatate PET CT showed 1. Multiple intensely DOTATATE-avid bilobar hepatic metastases. 2. DOTATATE-avid left supraclavicular, mediastinal, and upper abdominal lymph node metastases. 3. Focus of increased radiotracer activity in the spleen is compatible with metastasis.\par \par 4/4/22 report RUQ intermittent pain, 5/10, sometimes with diarrhea 4 to 5 times, denies abdominal cramps and nausea and vomiting. States generalized fatigue and loss of energy.

## 2022-04-05 NOTE — ASSESSMENT
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 CT scans in July showed stable disease. I have reviewed all the scans with the patient. She has metastatic disease, was discussed with the importance of compliance with her treatment. She feels fine, denies abdominal cramps, diarrhea. CT scans showed slightly increasing in size for her liver lesions in July 2020. She has progression of disease for her liver metastasis in March, 2021 scans. Dec 4 2021 CT scans showed progression of disease. \par \par Plan\par continue lanreotide monthly injection (hold off 4 to 6 weeks prior to PRRT treatment)\par had a lengthy discussion regarding next treatment for PRRT 177 Jyotsna dotatate.  She is referred to Dr. Haile at [facility], .  dotatate PET CT showed extensive disease. She also needs to stop lanreotide 4 to 6 weeks prior to treatment. AEs of this nucleotide include but are not limited to myelosuppression, secondary MDS and leukemia, renal, and hepatic toxicity. Alternatively everolimus 5 mg daily, AEs include but are not limited to fatigue, edema, hypertension, hyperglycemia, hypercholestolemia, nausea, vomiting, constipation vs diarrhea, skin rashes. She will think about it and decide which one she will choose\par dotatate PET CT ordered\par s/p  IR liver directed therapy 8/2021\par check labs \par RTC in 4 weeks \par

## 2022-04-06 LAB
ALBUMIN SERPL ELPH-MCNC: 3.9 G/DL
ALP BLD-CCNC: 463 U/L
ALT SERPL-CCNC: 35 U/L
ANION GAP SERPL CALC-SCNC: 13 MMOL/L
AST SERPL-CCNC: 35 U/L
BILIRUB SERPL-MCNC: 0.3 MG/DL
BUN SERPL-MCNC: 10 MG/DL
CALCIUM SERPL-MCNC: 9.2 MG/DL
CHLORIDE SERPL-SCNC: 98 MMOL/L
CO2 SERPL-SCNC: 26 MMOL/L
CREAT SERPL-MCNC: 0.5 MG/DL
EGFR: 112 ML/MIN/1.73M2
GLUCOSE SERPL-MCNC: 70 MG/DL
POTASSIUM SERPL-SCNC: 4.9 MMOL/L
PROT SERPL-MCNC: 8 G/DL
SODIUM SERPL-SCNC: 137 MMOL/L

## 2022-04-07 LAB — CGA SERPL-MCNC: 8231 NG/ML

## 2022-04-09 LAB — SEROTONIN BLD-MCNC: 2320 NG/ML

## 2022-04-13 ENCOUNTER — NON-APPOINTMENT (OUTPATIENT)
Age: 52
End: 2022-04-13

## 2022-04-18 ENCOUNTER — NON-APPOINTMENT (OUTPATIENT)
Age: 52
End: 2022-04-18

## 2022-04-22 ENCOUNTER — OUTPATIENT (OUTPATIENT)
Dept: OUTPATIENT SERVICES | Facility: HOSPITAL | Age: 52
LOS: 1 days | Discharge: HOME | End: 2022-04-22

## 2022-04-22 DIAGNOSIS — Z98.89 OTHER SPECIFIED POSTPROCEDURAL STATES: Chronic | ICD-10-CM

## 2022-04-22 DIAGNOSIS — D3A.8 OTHER BENIGN NEUROENDOCRINE TUMORS: ICD-10-CM

## 2022-04-22 DIAGNOSIS — Z90.49 ACQUIRED ABSENCE OF OTHER SPECIFIED PARTS OF DIGESTIVE TRACT: Chronic | ICD-10-CM

## 2022-04-28 ENCOUNTER — APPOINTMENT (OUTPATIENT)
Dept: INFUSION THERAPY | Facility: HOSPITAL | Age: 52
End: 2022-04-28

## 2022-05-26 ENCOUNTER — OUTPATIENT (OUTPATIENT)
Dept: OUTPATIENT SERVICES | Facility: HOSPITAL | Age: 52
LOS: 1 days | Discharge: HOME | End: 2022-05-26

## 2022-05-26 DIAGNOSIS — Z90.49 ACQUIRED ABSENCE OF OTHER SPECIFIED PARTS OF DIGESTIVE TRACT: Chronic | ICD-10-CM

## 2022-05-26 DIAGNOSIS — D3A.8 OTHER BENIGN NEUROENDOCRINE TUMORS: ICD-10-CM

## 2022-05-26 DIAGNOSIS — Z98.89 OTHER SPECIFIED POSTPROCEDURAL STATES: Chronic | ICD-10-CM

## 2022-06-02 ENCOUNTER — APPOINTMENT (OUTPATIENT)
Dept: INFUSION THERAPY | Facility: HOSPITAL | Age: 52
End: 2022-06-02

## 2022-06-13 ENCOUNTER — INPATIENT (INPATIENT)
Facility: HOSPITAL | Age: 52
LOS: 1 days | Discharge: HOME | End: 2022-06-15
Attending: HOSPITALIST | Admitting: HOSPITALIST
Payer: COMMERCIAL

## 2022-06-13 VITALS
HEART RATE: 97 BPM | OXYGEN SATURATION: 99 % | RESPIRATION RATE: 18 BRPM | TEMPERATURE: 99 F | DIASTOLIC BLOOD PRESSURE: 60 MMHG | SYSTOLIC BLOOD PRESSURE: 102 MMHG | HEIGHT: 62 IN

## 2022-06-13 DIAGNOSIS — Z98.89 OTHER SPECIFIED POSTPROCEDURAL STATES: Chronic | ICD-10-CM

## 2022-06-13 DIAGNOSIS — Z90.49 ACQUIRED ABSENCE OF OTHER SPECIFIED PARTS OF DIGESTIVE TRACT: Chronic | ICD-10-CM

## 2022-06-13 LAB
ALBUMIN SERPL ELPH-MCNC: 3.4 G/DL — SIGNIFICANT CHANGE UP (ref 3.3–5)
ALP SERPL-CCNC: 720 U/L — HIGH (ref 40–120)
ALT FLD-CCNC: 49 U/L — HIGH (ref 4–33)
ANION GAP SERPL CALC-SCNC: 11 MMOL/L — SIGNIFICANT CHANGE UP (ref 7–14)
ANISOCYTOSIS BLD QL: SLIGHT — SIGNIFICANT CHANGE UP
APPEARANCE UR: ABNORMAL
APTT BLD: 33.3 SEC — SIGNIFICANT CHANGE UP (ref 27–36.3)
AST SERPL-CCNC: 62 U/L — HIGH (ref 4–32)
B PERT DNA SPEC QL NAA+PROBE: SIGNIFICANT CHANGE UP
B PERT+PARAPERT DNA PNL SPEC NAA+PROBE: SIGNIFICANT CHANGE UP
BACTERIA # UR AUTO: ABNORMAL
BASE EXCESS BLDV CALC-SCNC: 5.3 MMOL/L — HIGH (ref -2–3)
BASOPHILS # BLD AUTO: 0 K/UL — SIGNIFICANT CHANGE UP (ref 0–0.2)
BASOPHILS NFR BLD AUTO: 0 % — SIGNIFICANT CHANGE UP (ref 0–2)
BILIRUB SERPL-MCNC: 1 MG/DL — SIGNIFICANT CHANGE UP (ref 0.2–1.2)
BILIRUB UR-MCNC: NEGATIVE — SIGNIFICANT CHANGE UP
BLD GP AB SCN SERPL QL: NEGATIVE — SIGNIFICANT CHANGE UP
BORDETELLA PARAPERTUSSIS (RAPRVP): SIGNIFICANT CHANGE UP
BUN SERPL-MCNC: 10 MG/DL — SIGNIFICANT CHANGE UP (ref 7–23)
C PNEUM DNA SPEC QL NAA+PROBE: SIGNIFICANT CHANGE UP
CA-I SERPL-SCNC: 1.26 MMOL/L — SIGNIFICANT CHANGE UP (ref 1.15–1.33)
CALCIUM SERPL-MCNC: 8.9 MG/DL — SIGNIFICANT CHANGE UP (ref 8.4–10.5)
CHLORIDE BLDV-SCNC: 99 MMOL/L — SIGNIFICANT CHANGE UP (ref 96–108)
CHLORIDE SERPL-SCNC: 97 MMOL/L — LOW (ref 98–107)
CO2 BLDV-SCNC: 33 MMOL/L — HIGH (ref 22–26)
CO2 SERPL-SCNC: 26 MMOL/L — SIGNIFICANT CHANGE UP (ref 22–31)
COD CRY URNS QL: ABNORMAL
COLOR SPEC: YELLOW — SIGNIFICANT CHANGE UP
CREAT SERPL-MCNC: 0.63 MG/DL — SIGNIFICANT CHANGE UP (ref 0.5–1.3)
DIFF PNL FLD: NEGATIVE — SIGNIFICANT CHANGE UP
EGFR: 106 ML/MIN/1.73M2 — SIGNIFICANT CHANGE UP
EOSINOPHIL # BLD AUTO: 0 K/UL — SIGNIFICANT CHANGE UP (ref 0–0.5)
EOSINOPHIL NFR BLD AUTO: 0 % — SIGNIFICANT CHANGE UP (ref 0–6)
EPI CELLS # UR: 1 /HPF — SIGNIFICANT CHANGE UP (ref 0–5)
FLUAV SUBTYP SPEC NAA+PROBE: SIGNIFICANT CHANGE UP
FLUBV RNA SPEC QL NAA+PROBE: SIGNIFICANT CHANGE UP
GAS PNL BLDV: 135 MMOL/L — LOW (ref 136–145)
GAS PNL BLDV: SIGNIFICANT CHANGE UP
GIANT PLATELETS BLD QL SMEAR: PRESENT — SIGNIFICANT CHANGE UP
GLUCOSE BLDV-MCNC: 116 MG/DL — HIGH (ref 70–99)
GLUCOSE SERPL-MCNC: 111 MG/DL — HIGH (ref 70–99)
GLUCOSE UR QL: NEGATIVE — SIGNIFICANT CHANGE UP
HADV DNA SPEC QL NAA+PROBE: SIGNIFICANT CHANGE UP
HCO3 BLDV-SCNC: 31 MMOL/L — HIGH (ref 22–29)
HCOV 229E RNA SPEC QL NAA+PROBE: SIGNIFICANT CHANGE UP
HCOV HKU1 RNA SPEC QL NAA+PROBE: SIGNIFICANT CHANGE UP
HCOV NL63 RNA SPEC QL NAA+PROBE: SIGNIFICANT CHANGE UP
HCOV OC43 RNA SPEC QL NAA+PROBE: SIGNIFICANT CHANGE UP
HCT VFR BLD CALC: 27 % — LOW (ref 34.5–45)
HCT VFR BLDA CALC: 31 % — LOW (ref 34.5–46.5)
HGB BLD CALC-MCNC: 10.3 G/DL — LOW (ref 11.5–15.5)
HGB BLD-MCNC: 8.9 G/DL — LOW (ref 11.5–15.5)
HMPV RNA SPEC QL NAA+PROBE: SIGNIFICANT CHANGE UP
HPIV1 RNA SPEC QL NAA+PROBE: SIGNIFICANT CHANGE UP
HPIV2 RNA SPEC QL NAA+PROBE: SIGNIFICANT CHANGE UP
HPIV3 RNA SPEC QL NAA+PROBE: SIGNIFICANT CHANGE UP
HPIV4 RNA SPEC QL NAA+PROBE: SIGNIFICANT CHANGE UP
IANC: 12.78 K/UL — HIGH (ref 1.8–7.4)
INR BLD: 1.39 RATIO — HIGH (ref 0.88–1.16)
KETONES UR-MCNC: NEGATIVE — SIGNIFICANT CHANGE UP
LACTATE BLDV-MCNC: 1.8 MMOL/L — SIGNIFICANT CHANGE UP (ref 0.5–2)
LEUKOCYTE ESTERASE UR-ACNC: NEGATIVE — SIGNIFICANT CHANGE UP
LYMPHOCYTES # BLD AUTO: 1.15 K/UL — SIGNIFICANT CHANGE UP (ref 1–3.3)
LYMPHOCYTES # BLD AUTO: 6.9 % — LOW (ref 13–44)
M PNEUMO DNA SPEC QL NAA+PROBE: SIGNIFICANT CHANGE UP
MANUAL SMEAR VERIFICATION: SIGNIFICANT CHANGE UP
MCHC RBC-ENTMCNC: 20.6 PG — LOW (ref 27–34)
MCHC RBC-ENTMCNC: 33 GM/DL — SIGNIFICANT CHANGE UP (ref 32–36)
MCV RBC AUTO: 62.4 FL — LOW (ref 80–100)
MICROCYTES BLD QL: SLIGHT — SIGNIFICANT CHANGE UP
MONOCYTES # BLD AUTO: 1.28 K/UL — HIGH (ref 0–0.9)
MONOCYTES NFR BLD AUTO: 7.7 % — SIGNIFICANT CHANGE UP (ref 2–14)
NEUTROPHILS # BLD AUTO: 13.79 K/UL — HIGH (ref 1.8–7.4)
NEUTROPHILS NFR BLD AUTO: 82.8 % — HIGH (ref 43–77)
NITRITE UR-MCNC: NEGATIVE — SIGNIFICANT CHANGE UP
PCO2 BLDV: 53 MMHG — HIGH (ref 39–42)
PH BLDV: 7.38 — SIGNIFICANT CHANGE UP (ref 7.32–7.43)
PH UR: 6.5 — SIGNIFICANT CHANGE UP (ref 5–8)
PLAT MORPH BLD: NORMAL — SIGNIFICANT CHANGE UP
PLATELET # BLD AUTO: 608 K/UL — HIGH (ref 150–400)
PLATELET COUNT - ESTIMATE: ABNORMAL
PO2 BLDV: <20 MMHG — SIGNIFICANT CHANGE UP
POTASSIUM BLDV-SCNC: 4.3 MMOL/L — SIGNIFICANT CHANGE UP (ref 3.5–5.1)
POTASSIUM SERPL-MCNC: 3.9 MMOL/L — SIGNIFICANT CHANGE UP (ref 3.5–5.3)
POTASSIUM SERPL-SCNC: 3.9 MMOL/L — SIGNIFICANT CHANGE UP (ref 3.5–5.3)
PROT SERPL-MCNC: 7.6 G/DL — SIGNIFICANT CHANGE UP (ref 6–8.3)
PROT UR-MCNC: ABNORMAL
PROTHROM AB SERPL-ACNC: 16.2 SEC — HIGH (ref 10.5–13.4)
RAPID RVP RESULT: SIGNIFICANT CHANGE UP
RBC # BLD: 4.33 M/UL — SIGNIFICANT CHANGE UP (ref 3.8–5.2)
RBC # FLD: 19.4 % — HIGH (ref 10.3–14.5)
RBC BLD AUTO: ABNORMAL
RBC CASTS # UR COMP ASSIST: 4 /HPF — SIGNIFICANT CHANGE UP (ref 0–4)
RH IG SCN BLD-IMP: POSITIVE — SIGNIFICANT CHANGE UP
RSV RNA SPEC QL NAA+PROBE: SIGNIFICANT CHANGE UP
RV+EV RNA SPEC QL NAA+PROBE: SIGNIFICANT CHANGE UP
SAO2 % BLDV: 16.4 % — SIGNIFICANT CHANGE UP
SARS-COV-2 RNA SPEC QL NAA+PROBE: SIGNIFICANT CHANGE UP
SODIUM SERPL-SCNC: 134 MMOL/L — LOW (ref 135–145)
SP GR SPEC: 1.01 — SIGNIFICANT CHANGE UP (ref 1–1.05)
TARGETS BLD QL SMEAR: SLIGHT — SIGNIFICANT CHANGE UP
UROBILINOGEN FLD QL: ABNORMAL
VARIANT LYMPHS # BLD: 2.6 % — SIGNIFICANT CHANGE UP (ref 0–6)
WBC # BLD: 16.66 K/UL — HIGH (ref 3.8–10.5)
WBC # FLD AUTO: 16.66 K/UL — HIGH (ref 3.8–10.5)
WBC UR QL: 1 /HPF — SIGNIFICANT CHANGE UP (ref 0–5)

## 2022-06-13 PROCEDURE — 74177 CT ABD & PELVIS W/CONTRAST: CPT | Mod: 26,MA

## 2022-06-13 PROCEDURE — 99285 EMERGENCY DEPT VISIT HI MDM: CPT

## 2022-06-13 PROCEDURE — 71045 X-RAY EXAM CHEST 1 VIEW: CPT | Mod: 26

## 2022-06-13 RX ORDER — PIPERACILLIN AND TAZOBACTAM 4; .5 G/20ML; G/20ML
3.38 INJECTION, POWDER, LYOPHILIZED, FOR SOLUTION INTRAVENOUS ONCE
Refills: 0 | Status: COMPLETED | OUTPATIENT
Start: 2022-06-13 | End: 2022-06-13

## 2022-06-13 RX ORDER — SODIUM CHLORIDE 9 MG/ML
1000 INJECTION INTRAMUSCULAR; INTRAVENOUS; SUBCUTANEOUS ONCE
Refills: 0 | Status: COMPLETED | OUTPATIENT
Start: 2022-06-13 | End: 2022-06-13

## 2022-06-13 RX ORDER — ACETAMINOPHEN 500 MG
650 TABLET ORAL ONCE
Refills: 0 | Status: COMPLETED | OUTPATIENT
Start: 2022-06-13 | End: 2022-06-13

## 2022-06-13 RX ADMIN — PIPERACILLIN AND TAZOBACTAM 200 GRAM(S): 4; .5 INJECTION, POWDER, LYOPHILIZED, FOR SOLUTION INTRAVENOUS at 22:28

## 2022-06-13 RX ADMIN — SODIUM CHLORIDE 1000 MILLILITER(S): 9 INJECTION INTRAMUSCULAR; INTRAVENOUS; SUBCUTANEOUS at 13:45

## 2022-06-13 RX ADMIN — Medication 650 MILLIGRAM(S): at 16:26

## 2022-06-13 RX ADMIN — PIPERACILLIN AND TAZOBACTAM 200 GRAM(S): 4; .5 INJECTION, POWDER, LYOPHILIZED, FOR SOLUTION INTRAVENOUS at 13:45

## 2022-06-13 RX ADMIN — Medication 650 MILLIGRAM(S): at 22:11

## 2022-06-13 NOTE — ED PROVIDER NOTE - ATTENDING CONTRIBUTION TO CARE
52F c/o gen weakness x months.  H/o neuroendocrine tumor, last tx nirav 2nd.  Weakness acutely worsening last few days.  Pt reports melena over past few days.  Febrile here.  No focal sx.  BP low.  Rx fluids, get cultures, rx zosyn.  Abd nontender.  Fever workup initially benign - check CT a/p given pt sick eval for source of fever.  Admit.  VS:  unremarkable except fever    GEN - malaise, cachexia;   A+O x3   HEAD - NC/AT     ENT - PEERL, EOMI, mucous membranes   dry, no discharge      NECK: Neck supple, non-tender without lymphadenopathy, no masses, no JVD  PULM - CTA b/l,  symmetric breath sounds  COR -  normal heart sounds    ABD - , ND, NT, soft,  BACK - no CVA tenderness, nontender spine     EXTREMS - no edema, no deformity, warm and well perfused    SKIN - no rash    or bruising      NEUROLOGIC - alert, face symmetric, speech fluent, sensation nl, motor no focal deficit.

## 2022-06-13 NOTE — ED PROVIDER NOTE - PHYSICAL EXAMINATION
Gen: A&Ox4. Cachetic.   HEENT: Atraumatic. Mucous membranes dry, no scleral icterus.  CV: RRR. No significant lower extremity edema.   Resp: Respirations unlabored. CTAB, no rales, no wheezes.  GI: Abdomen non tender to palpation, soft and non-distended.   Skin/MSK: No open wounds. No ecchymosis appreciated.  Neuro: Following commands. EOMI. Pupils ERRL.  Psych: Appropriate mood, cooperative

## 2022-06-13 NOTE — ED PROVIDER NOTE - NS ED ROS FT
Gen: Denies fever.   HEENT: Denies headache. Denies congestion.  CV: Denies chest pain. Denies lightheadedness.  Skin: Denies rash.   Resp: Denies SOB. Denies cough.  GI: Denies abd pain. Denies nausea. Denies vomiting. +diarrhea. +melena. Denies hematochezia.  Msk: Denies extremity swelling. Denies extremity pain.  : Denies dysuria. Denies hematuria.  Neuro: Denies LOC. Denies dizziness. Denies new numbness/tingling. Denies new focal weakness.  Psych: Denies SI

## 2022-06-13 NOTE — ED PROVIDER NOTE - OBJECTIVE STATEMENT
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 CT scans in July showed stable disease. I have reviewed all the scans with the patient. She has metastatic disease, was discussed with the importance of compliance with her treatment. She feels fine, denies abdominal cramps, diarrhea. CT scans showed slightly increasing in size for her liver lesions in July 2020. She has progression of disease for her liver metastasis in March, 2021 scans. Dec 4 2021 CT scans showed progression of disease.     Plan  continue lanreotide monthly injection (hold off 4 to 6 weeks prior to PRRT treatment)  had a lengthy discussion regarding next treatment for PRRT 177 [address] dotatate. She is referred to Dr. Haile at [facility], . dotatate PET CT showed extensive disease. She also needs to stop lanreotide 4 to 6 weeks prior to treatment. AEs of this nucleotide include but are not limited to myelosuppression, secondary MDS and leukemia, renal, and hepatic toxicity. Alternatively everolimus 5 mg daily, AEs include but are not limited to fatigue, edema, hypertension, hyperglycemia, hypercholestolemia, nausea, vomiting, constipation vs diarrhea, skin rashes. She will think about it and decide which one she will choose  dotatate PET CT ordered 50 yo M PMHx of NET from primary small intestine s/p resection, metastasis to the liver - on lanreotide monthly injection, plan for PRRT?, presenting to ED for generalized weakness over past few months, increasing significantly over past few days. Also reports episode of melena a few days ago, lightheadedness on standing, and chronic diarrhea. Denies fevers, cough, congestion. Denies changes in urination or abdominal pain. Has had decreased PO intake as of late.

## 2022-06-13 NOTE — ED ADULT NURSE NOTE - OBJECTIVE STATEMENT
51 yo F received in room 14. AOx4, ambulatory at baseline, respirations even and unlabored. Pt complains of generalized weakness. Pt states weakness may be caused by weight loss over 1+ years due to neuroendocrine CA. Pt denies CP, SOB. VSS. Son at bedside. Awaiting orders from MD received pt in room 14, 52 yr/o AOx4, ambulatory at baseline. PMH of neuroendocrine CA, has not started on chemo.  presented to the ED C/O generalized weakness. Pt states weakness may be caused by weight loss over 1+ years. active and passive ROM noted, no joint deformities noted. VSS, respirations even and unlabored, denies CP, SOB. Son at bedside. Awaiting orders from MD

## 2022-06-13 NOTE — ED PROVIDER NOTE - CLINICAL SUMMARY MEDICAL DECISION MAKING FREE TEXT BOX
50 yo M PMHx of NET from primary small intestine s/p resection, metastasis to the liver - on lanreotide monthly injection, plan for PRRT?, presenting to ED for generalized weakness over past few months, increasing significantly over past few days. Also reports episode of melena a few days ago, lightheadedness on standing, and chronic diarrhea. Denies fevers, cough, congestion. Denies changes in urination or abdominal pain. Has had decreased PO intake as of late. Exam as above. Concern for electrolyte abnormality, LUZ, dehydration, medication side effect, anemia, progression of metastatic CA, occult infection w/ fever. Labs, ecg, IVF, symptomatic tx, abx, will reassess.

## 2022-06-13 NOTE — ED PROVIDER NOTE - NSICDXPASTSURGICALHX_GEN_ALL_CORE_FT
PAST SURGICAL HISTORY:  H/O resection of small bowel 2014    S/P laparoscopy S/P Excsion left retroperitoneal mass 02/3/2014

## 2022-06-13 NOTE — ED ADULT TRIAGE NOTE - CHIEF COMPLAINT QUOTE
Patient has c/o weakness for the last month or so and she is waiting for her radiation treatment for cancer.

## 2022-06-13 NOTE — ED PROVIDER NOTE - PROGRESS NOTE DETAILS
Timoteo Lunsford: Case reviewed after hand off. Appreciate discussion with surgery. No acute surgical intervention for gall bladder wall thickening and mild ductal dilation. Will discuss case with hospitalist.

## 2022-06-13 NOTE — ED ADULT NURSE REASSESSMENT NOTE - NS ED NURSE REASSESS COMMENT FT1
pt resting in stretcher; placed on bedpan. iv fluids infused and abx. no complaints at this time. pending dispo. will continue to monitor.

## 2022-06-14 ENCOUNTER — NON-APPOINTMENT (OUTPATIENT)
Age: 52
End: 2022-06-14

## 2022-06-14 DIAGNOSIS — R19.7 DIARRHEA, UNSPECIFIED: ICD-10-CM

## 2022-06-14 DIAGNOSIS — R53.1 WEAKNESS: ICD-10-CM

## 2022-06-14 DIAGNOSIS — D64.9 ANEMIA, UNSPECIFIED: ICD-10-CM

## 2022-06-14 DIAGNOSIS — R65.10 SYSTEMIC INFLAMMATORY RESPONSE SYNDROME (SIRS) OF NON-INFECTIOUS ORIGIN WITHOUT ACUTE ORGAN DYSFUNCTION: ICD-10-CM

## 2022-06-14 DIAGNOSIS — C7A.8 OTHER MALIGNANT NEUROENDOCRINE TUMORS: ICD-10-CM

## 2022-06-14 DIAGNOSIS — D75.839 THROMBOCYTOSIS, UNSPECIFIED: ICD-10-CM

## 2022-06-14 DIAGNOSIS — R79.81 ABNORMAL BLOOD-GAS LEVEL: ICD-10-CM

## 2022-06-14 DIAGNOSIS — K92.2 GASTROINTESTINAL HEMORRHAGE, UNSPECIFIED: ICD-10-CM

## 2022-06-14 DIAGNOSIS — Z29.9 ENCOUNTER FOR PROPHYLACTIC MEASURES, UNSPECIFIED: ICD-10-CM

## 2022-06-14 LAB
ALBUMIN SERPL ELPH-MCNC: 3.2 G/DL — LOW (ref 3.3–5)
ALP SERPL-CCNC: 731 U/L — HIGH (ref 40–120)
ALT FLD-CCNC: 42 U/L — HIGH (ref 4–33)
ANION GAP SERPL CALC-SCNC: 10 MMOL/L — SIGNIFICANT CHANGE UP (ref 7–14)
AST SERPL-CCNC: 42 U/L — HIGH (ref 4–32)
BASE EXCESS BLDV CALC-SCNC: 1.7 MMOL/L — SIGNIFICANT CHANGE UP (ref -2–3)
BILIRUB DIRECT SERPL-MCNC: 0.4 MG/DL — HIGH (ref 0–0.3)
BILIRUB SERPL-MCNC: 0.9 MG/DL — SIGNIFICANT CHANGE UP (ref 0.2–1.2)
BUN SERPL-MCNC: 7 MG/DL — SIGNIFICANT CHANGE UP (ref 7–23)
CALCIUM SERPL-MCNC: 8.8 MG/DL — SIGNIFICANT CHANGE UP (ref 8.4–10.5)
CHLORIDE SERPL-SCNC: 101 MMOL/L — SIGNIFICANT CHANGE UP (ref 98–107)
CO2 BLDV-SCNC: 27.9 MMOL/L — HIGH (ref 22–26)
CO2 SERPL-SCNC: 26 MMOL/L — SIGNIFICANT CHANGE UP (ref 22–31)
CREAT SERPL-MCNC: 0.55 MG/DL — SIGNIFICANT CHANGE UP (ref 0.5–1.3)
CULTURE RESULTS: NO GROWTH — SIGNIFICANT CHANGE UP
EGFR: 110 ML/MIN/1.73M2 — SIGNIFICANT CHANGE UP
FERRITIN SERPL-MCNC: 214 NG/ML — HIGH (ref 15–150)
GAS PNL BLDV: SIGNIFICANT CHANGE UP
GLUCOSE SERPL-MCNC: 92 MG/DL — SIGNIFICANT CHANGE UP (ref 70–99)
HCO3 BLDV-SCNC: 27 MMOL/L — SIGNIFICANT CHANGE UP (ref 22–29)
HCT VFR BLD CALC: 31.1 % — LOW (ref 34.5–45)
HGB BLD-MCNC: 10 G/DL — LOW (ref 11.5–15.5)
IRON SATN MFR SERPL: 12 UG/DL — LOW (ref 30–160)
IRON SATN MFR SERPL: 7 % — LOW (ref 14–50)
MAGNESIUM SERPL-MCNC: 2 MG/DL — SIGNIFICANT CHANGE UP (ref 1.6–2.6)
MCHC RBC-ENTMCNC: 20.7 PG — LOW (ref 27–34)
MCHC RBC-ENTMCNC: 32.2 GM/DL — SIGNIFICANT CHANGE UP (ref 32–36)
MCV RBC AUTO: 64.4 FL — LOW (ref 80–100)
NRBC # BLD: 0 /100 WBCS — SIGNIFICANT CHANGE UP
NRBC # FLD: 0 K/UL — SIGNIFICANT CHANGE UP
PCO2 BLDV: 42 MMHG — SIGNIFICANT CHANGE UP (ref 39–42)
PH BLDV: 7.41 — SIGNIFICANT CHANGE UP (ref 7.32–7.43)
PHOSPHATE SERPL-MCNC: 3.4 MG/DL — SIGNIFICANT CHANGE UP (ref 2.5–4.5)
PLATELET # BLD AUTO: 609 K/UL — HIGH (ref 150–400)
PO2 BLDV: 76 MMHG — SIGNIFICANT CHANGE UP
POTASSIUM SERPL-MCNC: 4 MMOL/L — SIGNIFICANT CHANGE UP (ref 3.5–5.3)
POTASSIUM SERPL-SCNC: 4 MMOL/L — SIGNIFICANT CHANGE UP (ref 3.5–5.3)
PROT SERPL-MCNC: 7.4 G/DL — SIGNIFICANT CHANGE UP (ref 6–8.3)
RBC # BLD: 4.83 M/UL — SIGNIFICANT CHANGE UP (ref 3.8–5.2)
RBC # BLD: 4.83 M/UL — SIGNIFICANT CHANGE UP (ref 3.8–5.2)
RBC # FLD: 19.2 % — HIGH (ref 10.3–14.5)
RETICS #: 52.3 K/UL — SIGNIFICANT CHANGE UP (ref 25–125)
RETICS/RBC NFR: 1.1 % — SIGNIFICANT CHANGE UP (ref 0.5–2.5)
SAO2 % BLDV: 95 % — SIGNIFICANT CHANGE UP
SODIUM SERPL-SCNC: 137 MMOL/L — SIGNIFICANT CHANGE UP (ref 135–145)
SPECIMEN SOURCE: SIGNIFICANT CHANGE UP
TIBC SERPL-MCNC: 184 UG/DL — LOW (ref 220–430)
UIBC SERPL-MCNC: 172 UG/DL — SIGNIFICANT CHANGE UP (ref 110–370)
WBC # BLD: 18.1 K/UL — HIGH (ref 3.8–10.5)
WBC # FLD AUTO: 18.1 K/UL — HIGH (ref 3.8–10.5)

## 2022-06-14 PROCEDURE — 76705 ECHO EXAM OF ABDOMEN: CPT | Mod: 26

## 2022-06-14 PROCEDURE — 12345: CPT | Mod: NC

## 2022-06-14 PROCEDURE — 99223 1ST HOSP IP/OBS HIGH 75: CPT | Mod: GC

## 2022-06-14 PROCEDURE — 74183 MRI ABD W/O CNTR FLWD CNTR: CPT | Mod: 26

## 2022-06-14 RX ORDER — SODIUM CHLORIDE 9 MG/ML
1000 INJECTION INTRAMUSCULAR; INTRAVENOUS; SUBCUTANEOUS
Refills: 0 | Status: DISCONTINUED | OUTPATIENT
Start: 2022-06-14 | End: 2022-06-14

## 2022-06-14 RX ORDER — CLOTRIMAZOLE 10 MG
1 TROCHE MUCOUS MEMBRANE
Refills: 0 | Status: DISCONTINUED | OUTPATIENT
Start: 2022-06-14 | End: 2022-06-15

## 2022-06-14 RX ORDER — PIPERACILLIN AND TAZOBACTAM 4; .5 G/20ML; G/20ML
3.38 INJECTION, POWDER, LYOPHILIZED, FOR SOLUTION INTRAVENOUS EVERY 8 HOURS
Refills: 0 | Status: DISCONTINUED | OUTPATIENT
Start: 2022-06-14 | End: 2022-06-15

## 2022-06-14 RX ORDER — PANTOPRAZOLE SODIUM 20 MG/1
40 TABLET, DELAYED RELEASE ORAL EVERY 12 HOURS
Refills: 0 | Status: DISCONTINUED | OUTPATIENT
Start: 2022-06-14 | End: 2022-06-15

## 2022-06-14 RX ADMIN — PANTOPRAZOLE SODIUM 40 MILLIGRAM(S): 20 TABLET, DELAYED RELEASE ORAL at 17:07

## 2022-06-14 RX ADMIN — Medication 1 LOZENGE: at 22:56

## 2022-06-14 RX ADMIN — Medication 1 LOZENGE: at 13:39

## 2022-06-14 RX ADMIN — SODIUM CHLORIDE 100 MILLILITER(S): 9 INJECTION INTRAMUSCULAR; INTRAVENOUS; SUBCUTANEOUS at 03:38

## 2022-06-14 RX ADMIN — PIPERACILLIN AND TAZOBACTAM 25 GRAM(S): 4; .5 INJECTION, POWDER, LYOPHILIZED, FOR SOLUTION INTRAVENOUS at 06:45

## 2022-06-14 RX ADMIN — Medication 1 LOZENGE: at 17:08

## 2022-06-14 RX ADMIN — PIPERACILLIN AND TAZOBACTAM 25 GRAM(S): 4; .5 INJECTION, POWDER, LYOPHILIZED, FOR SOLUTION INTRAVENOUS at 22:04

## 2022-06-14 RX ADMIN — PANTOPRAZOLE SODIUM 40 MILLIGRAM(S): 20 TABLET, DELAYED RELEASE ORAL at 06:45

## 2022-06-14 RX ADMIN — PIPERACILLIN AND TAZOBACTAM 25 GRAM(S): 4; .5 INJECTION, POWDER, LYOPHILIZED, FOR SOLUTION INTRAVENOUS at 13:39

## 2022-06-14 NOTE — H&P ADULT - PROBLEM SELECTOR PLAN 3
Hgb to 8.9 on admission.  Baseline 10-11 last year. Possibly in s/o acute blood loss  - Trend CBC QD for now. STAT CBC with melanotic/BRBPR stools  - maintain active type and screen  - iron studies, ferritin Hgb to 8.9 on admission.  Baseline 10-11 last year. Possibly in s/o acute blood loss, lanreotide can also cause anemia.   - Trend CBC QD for now. STAT CBC with melanotic/BRBPR stools  - maintain active type and screen  - iron studies, ferritin r/o infectious source. Lanreotide can also cause diarrrhea. Last episode on Thursday  -f/u GI PCR, stool O and P. Order Cdiff if worsening diarrhea. r/o infectious source. Lanreotide can also cause diarrhea. Last episode on Thursday  -f/u GI PCR, stool O and P. Order Cdiff if worsening diarrhea.

## 2022-06-14 NOTE — H&P ADULT - NSHPPHYSICALEXAM_GEN_ALL_CORE
Vital Signs Last 24 Hrs  T(C): 37.4 (14 Jun 2022 00:28), Max: 38.3 (13 Jun 2022 11:40)  T(F): 99.4 (14 Jun 2022 00:28), Max: 100.9 (13 Jun 2022 11:40)  HR: 80 (14 Jun 2022 00:28) (70 - 97)  BP: 109/65 (14 Jun 2022 00:28) (98/71 - 131/77)  BP(mean): --  RR: 18 (14 Jun 2022 00:28) (18 - 19)  SpO2: 100% (14 Jun 2022 00:28) (99% - 100%)    PHYSICAL EXAM:  GENERAL: NAD, Resting in bed  HEENT:  Head atraumatic, EOMI, PERRLA, conjunctiva and sclera clear; Moist mucous membranes, normal oropharynx  NECK: Supple, No JVD, no lymphadenopathy, no thyroid nodules or enlargement  CHEST/LUNG: Clear to auscultation bilaterally; No rales, rhonchi, wheezing, or rubs. Unlabored respirations on room air  HEART: Regular rate and rhythm; No murmurs, rubs, or gallops  ABDOMEN: Bowel sounds present; Soft, Nontender, Nondistended. No hepatomegally  EXTREMITIES:  2+ Peripheral Pulses, brisk capillary refill. No clubbing, cyanosis, or edema  NERVOUS SYSTEM:  Alert & Oriented X3, non-focal and spontaneous movements of all extremities  SKIN: No rashes or lesions Vital Signs Last 24 Hrs  T(C): 37.4 (14 Jun 2022 00:28), Max: 38.3 (13 Jun 2022 11:40)  T(F): 99.4 (14 Jun 2022 00:28), Max: 100.9 (13 Jun 2022 11:40)  HR: 80 (14 Jun 2022 00:28) (70 - 97)  BP: 109/65 (14 Jun 2022 00:28) (98/71 - 131/77)  BP(mean): --  RR: 18 (14 Jun 2022 00:28) (18 - 19)  SpO2: 100% (14 Jun 2022 00:28) (99% - 100%)    PHYSICAL EXAM:  GENERAL: NAD, Resting in bed  HEENT:  Head atraumatic, EOMI, conjunctiva and sclera clear; Moist mucous membranes, normal oropharynx  NECK: Supple, No JVD, no lymphadenopathy, no thyroid nodules or enlargement  CHEST/LUNG: Clear to auscultation bilaterally; No rales, rhonchi, wheezing, or rubs. Unlabored respirations on room air  HEART: Regular rate and rhythm; No murmurs, rubs, or gallops  ABDOMEN: Bowel sounds present; Soft, Nontender, Nondistended.  EXTREMITIES:  2+ Peripheral Pulses, brisk capillary refill. No clubbing, cyanosis, or edema  NERVOUS SYSTEM:  Alert & Oriented X3, non-focal and spontaneous movements of all extremities  SKIN: No rashes or lesions Vital Signs Last 24 Hrs  T(C): 37.4 (14 Jun 2022 00:28), Max: 38.3 (13 Jun 2022 11:40)  T(F): 99.4 (14 Jun 2022 00:28), Max: 100.9 (13 Jun 2022 11:40)  HR: 80 (14 Jun 2022 00:28) (70 - 97)  BP: 109/65 (14 Jun 2022 00:28) (98/71 - 131/77)  BP(mean): --  RR: 18 (14 Jun 2022 00:28) (18 - 19)  SpO2: 100% (14 Jun 2022 00:28) (99% - 100%)    PHYSICAL EXAM:  GENERAL: NAD, Resting in bed  HEENT:  Head atraumatic, EOMI, conjunctiva and sclera clear; Moist mucous membranes, ?thrush  NECK: Supple, No JVD, no lymphadenopathy, no thyroid nodules or enlargement  CHEST/LUNG: Clear to auscultation bilaterally; No rales, rhonchi, wheezing, or rubs. Unlabored respirations on room air  HEART: Regular rate and rhythm; No murmurs, rubs, or gallops  ABDOMEN: Bowel sounds present; Soft, +RUQ TTP, Nondistended.  BACK: No TTP over spinal bones  EXTREMITIES:  2+ Peripheral Pulses, brisk capillary refill. No clubbing, cyanosis, or edema  NERVOUS SYSTEM:  Alert & Oriented X3, non-focal and spontaneous movements of all extremities  PSYCH: Nl affect, nl behavior  SKIN: No rashes or lesions

## 2022-06-14 NOTE — H&P ADULT - PROBLEM SELECTOR PLAN 2
- Reports melena  - PPI 40mg IV BID  - Maintain active type and screen  - 2 large bore IVS  - GI email - Reports melena  - PPI 40mg IV BID  - FOBT?  - Maintain active type and screen  - 2 large bore IVS  - GI emailed - Reports melena  - PPI 40mg IV BID  - FOBT pending  - Maintain active type and screen  - 2 large bore IVS  - GI emailed

## 2022-06-14 NOTE — PROGRESS NOTE ADULT - SUBJECTIVE AND OBJECTIVE BOX
MARIANELA [id number]  52yFemale  T(C): 37.2 (06-14-22 @ 14:18), Max: 38.1 (06-13-22 @ 16:08)  HR: 76 (06-14-22 @ 14:18) (70 - 85)  BP: 107/69 (06-14-22 @ 14:18) (103/62 - 131/77)  RR: 16 (06-14-22 @ 14:18) (16 - 19)  SpO2: 99% (06-14-22 @ 14:18) (99% - 100%)  Wt(kg): --

## 2022-06-14 NOTE — CONSULT NOTE ADULT - SUBJECTIVE AND OBJECTIVE BOX
General Surgery Consult  Consulting surgical team: B TEAM SURGERY  Consulting attending: Dr. 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 Luu and SBO s/p ex lap NEELAM  Dr. Eaton now on lantreotide injections and s/p Y90 treatment by IR here with progressive weakness and ?melena. In ED, T 100.9, VSS, benign ab exam, wbc 16, tbili 1, alkP 720, ast/alt 62/49, CT shows progressive metastatic disease and new gallbladder wall thickening. Surgery consulted.    patient denies fever, n/v, ab pain  endorses weight loss    PAST MEDICAL HISTORY:  Vertigo    Neoplasm of digestive system    Malignant neoplasm of small intestine    Anemia        PAST SURGICAL HISTORY:    S/P laparoscopy    H/O resection of small bowel    ex lap NEELAM      MEDICATIONS:      ALLERGIES:  No Known Allergies      VITALS & I/Os:  Vital Signs Last 24 Hrs  T(C): 37.4 (2022 00:28), Max: 38.3 (2022 11:40)  T(F): 99.4 (2022 00:28), Max: 100.9 (2022 11:40)  HR: 80 (2022 00:28) (70 - 97)  BP: 109/65 (2022 00:28) (98/71 - 131/77)  BP(mean): --  RR: 18 (2022 00:28) (18 - 19)  SpO2: 100% (2022 00:28) (99% - 100%)    I&O's Summary      PHYSICAL EXAM:  General: No acute distress  Respiratory: Nonlabored  Cardiovascular: RRR  Abdominal: Soft, nondistended, nontender. No rebound or guarding. No organomegaly, no palpable mass. surgical scar well healed  Extremities: Warm    LABS:                        8.9    16.66 )-----------( 608      ( 2022 13:38 )             27.0     06-13    134<L>  |  97<L>  |  10  ----------------------------<  111<H>  3.9   |  26  |  0.63    Ca    8.9      2022 13:38    TPro  7.6  /  Alb  3.4  /  TBili  1.0  /  DBili  x   /  AST  62<H>  /  ALT  49<H>  /  AlkPhos  720<H>      Lactate:   @ 13:38  1.8    PT/INR - ( 2022 13:38 )   PT: 16.2 sec;   INR: 1.39 ratio         PTT - ( 2022 13:38 )  PTT:33.3 sec          Urinalysis Basic - ( 2022 15:00 )    Color: Yellow / Appearance: Slightly Turbid / S.015 / pH: x  Gluc: x / Ketone: Negative  / Bili: Negative / Urobili: 3 mg/dL   Blood: x / Protein: Trace / Nitrite: Negative   Leuk Esterase: Negative / RBC: 4 /HPF / WBC 1 /HPF   Sq Epi: x / Non Sq Epi: 1 /HPF / Bacteria: Few        IMAGING:    ACC: [id number] EXAM:  CT ABDOMEN AND PELVIS IC                          PROCEDURE DATE:  2022          INTERPRETATION:  CLINICAL INFORMATION: Fever, infection of unknown   origin. Metastatic neuroendocrine tumor.    COMPARISON: CT chest abdomen pelvis dated 3/12/2022. PET/CT dated   3/29/2022.    CONTRAST/COMPLICATIONS:  IV Contrast: Omnipaque 350  60 cc administered   0 cc discarded  Oral Contrast: NONE  Complications: None reported at time of study completion    PROCEDURE:  CT of the Abdomen and Pelvis was performed.  Sagittal and coronal reformats were performed.    FINDINGS:  LOWER CHEST: New 4 mm anterior right upper lobe nodule on 2:3,   nonspecific.    LIVER: The liver is enlarged with innumerable hepatic metastases from   known metastatic neuroendocrine tumor, progressed since the previous   examination.  BILE DUCTS: Slightly increased prominence of the common bile duct,   measuring 8 mm in diameter.  GALLBLADDER: New asymmetric wall thickening/enhancement.  SPLEEN: Within normal limits.  PANCREAS: Persistent, stable prominence of the main pancreatic duct.  ADRENALS: Within normal limits.  KIDNEYS/URETERS: No hydronephrosis.    BLADDER: Within normal limits.  REPRODUCTIVE ORGANS: Myomatous uterus.    BOWEL: Redemonstrated partial small bowel resection with multiple   anastomoses. No bowel obstruction.  PERITONEUM: Trace free fluid in the pelvis. No free air.  VESSELS: Within normal limits.  RETROPERITONEUM/LYMPH NODES: An enlarged portacaval lymph node measures   1.6 x 1.3 cm (2:42), decreased in size from 3/12/2022.  ABDOMINAL WALL: Small fat-containing umbilical hernia. Redemonstration   multiple subcutaneous soft tissue nodules in the lower back and upper   gluteal region bilaterally, similar in appearance to the previous study.   Anasarca.  BONES: Degenerative changes.    IMPRESSION:  Progression of hepatic metastatic disease when compared with previous   study of 3/12/2022.    Asymmetric gallbladder wall thickening/enhancement, including subtle   increased prominence of the common bile duct may be   infectious/inflammatory.    New nonspecific 4 mm right pulmonary nodule, concerning for metastasis.     --- End of Report ---          RAJIV AVILA MD; Resident Radiologist  This document has been electronically signed.  KALEN PAZ MD; Attending Radiologist  This document has been electronically signed. 2022  9:52PM      ACC: [id number] EXAM:  US ABDOMEN RT UPR QUADRANT                          PROCEDURE DATE:  2022          INTERPRETATION:  CLINICAL INFORMATION: Abdominal pain, concern for   cholecystitis. Metastatic neuroendocrine tumor.    COMPARISON: CT abdomen pelvis from the same date.    TECHNIQUE: Sonography of the right upper quadrant.    FINDINGS:  Liver: Enlarged and heterogeneous with multiple metastases.  Bile ducts: Normal caliber. Common bile duct measures 3 mm.  Gallbladder: Diffuse shadowing of the gallbladder which limits   evaluation. Negative sonographic Chavez's sign.  Pancreas: Visualized portions are within normal limits.  Right kidney: 10.1 cm. No hydronephrosis.  Ascites: None.  IVC: Visualized portions are within normal limits.    IMPRESSION:  Diffuse shadowing of the gallbladder limiting full evaluation, which   could represent multiple gallstones versus vicarious excretion of   contrast from earlier CT. If there is high clinical suspicion for   cholecystitis, HIDA scan may be performed.    Redemonstrated hepatic metastases.      --- End of Report ---          RAJIV AVILA MD; Resident Radiologist  This document has been electronically signed.  KALEN PAZ MD; Attending Radiologist  This document has been electronically signed. 2022 12:58AM

## 2022-06-14 NOTE — H&P ADULT - HISTORY OF PRESENT ILLNESS
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 Patient also reports episode of melena a couple of days ago and  associated lightheadedness and decreased PO intake. Patient also has chronic diarrhea. She denies CP, SOB, subjective f/c, n/v.     In the ED, labs significant for WBC 16, Hgb 8.9, platelet 608, Alk phos 20, ALT 49, AST 62. CTAP with evidence of dialted CBD and new 4mm right pulmonary nodule and progression of hepatic metastases. Given zosynx1, NS  bolus, and tylenolx1.  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 Patient also reports episode of melena, and  associated lightheadedness and decreased PO intake. States last melanotic episode was last Tuesday, however, before then was having multiple dark black stools (estimates about 20 in total). Patient also endorses chronic diarrhea  since last year, however states that last week she was having multiple episodes of diarrhea and they were not similar to prior. Patient states that diarrhea has since stopped as of Thursday. Reports last BM was yesterday, was normal in color and soft in consistency.  She denies CP, SOB, subjective f/c, n/v.    In regards to her cancer, patient follows with Dr. Whitmore at [facility]. Last got lanreotide on 6/2 (next due 7/1). Also reports that she is pending peptide receptor radionuclide therapy (PRRT)  on 6/30. Patient previously had radiation therapy last year.      In the ED, labs significant for WBC 16, Hgb 8.9, platelet 608, Alk phos 20, ALT 49, AST 62.  Vital signs with Tmax: 100.9, HR:70 - 97, BP: 98/71 - 131/77. CTAP with evidence of dilated CBD and new 4mm right pulmonary nodule and progression of hepatic metastases. Given zosynx1, NS  bolus, and tylenolx1.  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 Patient also reports episode of melena, and associated malaise and decreased PO intake. States last melanotic episode was last Tuesday, however, before then was having multiple dark black stools (estimates about 20 in total). Patient also endorses chronic diarrhea  since last year, however states that last week she was having multiple episodes of diarrhea and they were not similar to prior, said that she had received her lanreotide injection prior to the start her of diarrhea. Patient states that diarrhea has since stopped as of Thursday. Reports last BM was yesterday, was normal in color and soft in consistency. She denies CP, SOB, subjective f/c, n/v.    In regards to her cancer, patient follows with Dr. Whitmore at [facility]. Last got lanreotide on 6/2 (next due 7/1). Also reports that she is pending peptide receptor radionuclide therapy (PRRT)  on 6/30. Patient previously had radiation therapy last year.      In the ED, labs significant for WBC 16, Hgb 8.9, platelet 608, Alk phos 20, ALT 49, AST 62.  Vital signs with Tmax: 100.9, HR:70 - 97, BP: 98/71 - 131/77. CTAP with evidence of dilated CBD and new 4mm right pulmonary nodule and progression of hepatic metastases. Given zosynx1, NS  bolus, and tylenolx1.

## 2022-06-14 NOTE — H&P ADULT - PROBLEM SELECTOR PLAN 7
DVT ppx: SCDs in s/o reported melena.   Diet: NPO for now pending GI recs. NSx10 hours while NPO Neuroendocrine tumor from primary small intestine s/p resection with metastases to the liver   - on lanreotide monthly injection  - Imaging concern for worsening hepatic metastases and now new 4mm pulmonary nodule that may also be mets  - Oncologist: Dr. Whitmore at [facility]  - Consider sooner appointment on discharge.  - LFT elevation possibly 2/2 worsening hepatic mets, monitor LFTs

## 2022-06-14 NOTE — H&P ADULT - PROBLEM SELECTOR PLAN 1
Temp to 100.9 in ED. WBC to 16.  s/p zosynx1 in ED  -CT imaging with dilated 8mm CBD and gallbladder wall thickening. RUQ US with limited evaluation.  - per surgery, low concern for acute cholecytitis, recommending to monitor off abx  - blood culturesx2 pending  -urine cutlure pending  - CXR with clear lungs.   - c/w empiric abx: vanc and zosyn? Temp to 100.9 in ED. WBC to 16.  s/p zosynx1 in ED  -CT imaging with dilated 8mm CBD and gallbladder wall thickening. RUQ US with limited evaluation.  - Patient also reporting diarrhea, last on Thursday.  - per surgery, low concern for acute cholecystitis, recommending to monitor off abx  - blood culturesx2 pending  -urine culture pending  - CXR with clear lungs.   - f/u MRSA swab  - f/u GI PCR, stool o and P. Order Cdiff if worsening diarrhea.   - c/w empiric abx: vanc and zosyn? Temp to 100.9 in ED. WBC to 16.  s/p zosynx1 in ED  -CT imaging with dilated 8mm CBD and gallbladder wall thickening. RUQ US with limited evaluation.  - Patient also reporting diarrhea, last on Thursday.  - per surgery, low concern for acute cholecystitis  - f/u MRCP  ordered for further eval   - blood culturesx2 pending  -urine culture pending  - CXR with clear lungs.   - f/u MRSA swab  - f/u GI PCR, stool O and P. Order Cdiff if worsening diarrhea.   - c/w empiric abx: Zosyn Temp to 100.9 in ED. WBC to 16.  s/p zosynx1 in ED  -CT imaging with dilated 8mm CBD and gallbladder wall thickening. RUQ US with limited evaluation.  - Patient also reporting diarrhea, last on Thursday.  - per surgery, low concern for acute cholecystitis  - f/u MRCP ordered for further eval, holding off on HIDA as no stones seen on CT, doubtful it would be high yield currently  - blood culturesx2 pending  -urine culture pending  - CXR with clear lungs.   - f/u MRSA swab  - f/u GI PCR, stool O and P. Order Cdiff if worsening diarrhea.   - c/w empiric abx: Zosyn Temp to 100.9 in ED. WBC to 16.  s/p zosynx1 in ED  -CT imaging with dilated 8mm CBD and gallbladder wall thickening. RUQ US with limited evaluation.  - Patient also reporting diarrhea, last on Thursday.  - per surgery, low concern for acute cholecystitis  - f/u MRCP ordered for further eval, holding off on HIDA as no stones seen on CT, doubtful it would be high yield currently  - blood culturesx2 pending  -urine culture pending  - CXR with clear lungs.   - f/u MRSA swab  - f/u GI PCR, stool O and P. Order Cdiff if worsening diarrhea.   - c/w empiric abx: Zosyn  - Lactate wnl Temp to 100.9 in ED. WBC to 16.  s/p zosynx1 in ED  -CT imaging with dilated 8mm CBD and gallbladder wall thickening. RUQ US with limited evaluation.  - Patient also reporting diarrhea, last on Thursday.  - per surgery, low concern for acute cholecystitis  - f/u MRCP ordered for further eval, holding off on HIDA as no stones seen on CT, doubtful it would be high yield currently  - blood culturesx2 pending  -urine culture pending  - CXR with clear lungs.   - f/u MRSA swab  - f/u GI PCR, stool O and P. Order Cdiff if worsening diarrhea.   - c/w empiric abx: Zosyn  - Lactate wnl  - Of note, patient with ?thrush on tongue, will trial clotrimazole lozenges and monitor

## 2022-06-14 NOTE — H&P ADULT - ATTENDING COMMENTS
Patient seen and examined on 6/14/22, case discussed with Dr. Yeimy Doyle. This is a 52F with history as above who presents to the hospital with complaints of worsening weakness/malaise and episodic diarrhea/melena at home. Currently states that she has some RUQ pain and has had intermittent RUQ at home. Otherwise denies noting any fevers/chills at home. No diarrhea or melena/hematochezia currently. Would c/w work up as above, obtain MRCP for further eval of her CT findings, GI eval. Spoke with patient regarding other findings on imaging, recommended further follow up with Dr. Whitmore for further eval of her worsening mets.

## 2022-06-14 NOTE — PROGRESS NOTE ADULT - SUBJECTIVE AND OBJECTIVE BOX
Estuardo Alarcon, MS4 Sub-I  Department of Internal Medicine  Available on Microsoft Teams  Pager [phone]    Patient is a 52y old  Female who presents with a chief complaint of weakness/melena (2022 01:59)      OVERNIGHT EVENTS: No acute overnight events.    SUBJECTIVE: Pt seen and examined. Denies fevers, chills, CP, SOB, Abdominal pain, N/V, Constipation, Diarrhea    MEDICATIONS  (STANDING):  pantoprazole  Injectable 40 milliGRAM(s) IV Push every 12 hours  piperacillin/tazobactam IVPB.. 3.375 Gram(s) IV Intermittent every 8 hours  sodium chloride 0.9%. 1000 milliLiter(s) (100 mL/Hr) IV Continuous <Continuous>    MEDICATIONS  (PRN):      I&O's Summary      Vital Signs Last 24 Hrs  T(C): 37.2 (2022 03:44), Max: 38.3 (2022 11:40)  T(F): 99 (2022 03:44), Max: 100.9 (2022 11:40)  HR: 80 (2022 03:44) (70 - 97)  BP: 113/73 (2022 03:44) (98/71 - 131/77)  BP(mean): 83 (2022 03:44) (83 - 83)  RR: 18 (2022 03:44) (18 - 19)  SpO2: 100% (2022 03:44) (99% - 100%)    =================PHYSICAL EXAM=================    GENERAL: Laying comfortably, NAD  EYES: EOMI, PERRL, no scleral icterus  NECK: No JVD  LUNG: Clear to auscultation bilaterally; No wheeze, crackles or rhonci  HEART: Regular rate and rhythm; No murmurs, rubs, or gallops  ABDOMEN: Soft, Nontender, Nondistended  EXTREMITIES:  No LE edema, 2+ Peripheral Pulses, No clubbing, cyanosis, or edema  PSYCH: AAOx3  NEUROLOGY: non-focal, strength 5/5 in all extremities, sensation intact  SKIN: No rashes or lesions    =================================================    LABS:                        8.9    16.66 )-----------( 608      ( 2022 13:38 )             27.0     Auto Eosinophil # 0.00  / Auto Eosinophil % 0.0   / Auto Neutrophil # 13.79 / Auto Neutrophil % 82.8  / BANDS % x            134<L>  |  97<L>  |  10  ----------------------------<  111<H>  3.9   |  26  |  0.63    Ca    8.9      2022 13:38  TPro  7.6  /  Alb  3.4  /  TBili  1.0  /  DBili  x   /  AST  62<H>  /  ALT  49<H>  /  AlkPhos  720<H>  0613    PT/INR - ( 2022 13:38 )   PT: 16.2 sec;   INR: 1.39 ratio         PTT - ( 2022 13:38 )  PTT:33.3 sec      Urinalysis Basic - ( 2022 15:00 )    Color: Yellow / Appearance: Slightly Turbid / S.015 / pH: x  Gluc: x / Ketone: Negative  / Bili: Negative / Urobili: 3 mg/dL   Blood: x / Protein: Trace / Nitrite: Negative   Leuk Esterase: Negative / RBC: 4 /HPF / WBC 1 /HPF   Sq Epi: x / Non Sq Epi: 1 /HPF / Bacteria: Few            RADIOLOGY & ADDITIONAL TESTS:    Imaging Personally Reviewed:    Consultant(s) Notes Reviewed:      Care Discussed with Consultants/Other Providers:   Estuardo Alarcon, MS4 Sub-I  Department of Internal Medicine  Available on Microsoft Teams  Pager [phone]    Patient is a 52y old  Female who presents with a chief complaint of weakness/melena (2022 01:59)      OVERNIGHT EVENTS: No acute overnight events.    SUBJECTIVE: Pt seen and examined. Feels better, stilll has some RUQ pain. Denies fevers, chills, CP, SOB, N/V, Constipation, Diarrhea    MEDICATIONS  (STANDING):  pantoprazole  Injectable 40 milliGRAM(s) IV Push every 12 hours  piperacillin/tazobactam IVPB.. 3.375 Gram(s) IV Intermittent every 8 hours  sodium chloride 0.9%. 1000 milliLiter(s) (100 mL/Hr) IV Continuous <Continuous>    MEDICATIONS  (PRN):      I&O's Summary      Vital Signs Last 24 Hrs  T(C): 37.2 (2022 03:44), Max: 38.3 (2022 11:40)  T(F): 99 (2022 03:44), Max: 100.9 (2022 11:40)  HR: 80 (2022 03:44) (70 - 97)  BP: 113/73 (2022 03:44) (98/71 - 131/77)  BP(mean): 83 (2022 03:44) (83 - 83)  RR: 18 (2022 03:44) (18 - 19)  SpO2: 100% (2022 03:44) (99% - 100%)    =================PHYSICAL EXAM=================    GENERAL: Laying comfortably, NAD  EYES: EOMI, PERRL, no scleral icterus  NECK: No JVD  LUNG: Clear to auscultation bilaterally; No wheeze, crackles or rhonci  HEART: Regular rate and rhythm; No murmurs, rubs, or gallops  ABDOMEN: RUQ TTP, + Chavez sign  EXTREMITIES:  No LE edema, 2+ Peripheral Pulses, No clubbing, cyanosis, or edema  PSYCH: AAOx3  NEUROLOGY: non-focal, strength 5/5 in all extremities, sensation intact  SKIN: No rashes or lesions    =================================================    LABS:                        8.9    16.66 )-----------( 608      ( 2022 13:38 )             27.0     Auto Eosinophil # 0.00  / Auto Eosinophil % 0.0   / Auto Neutrophil # 13.79 / Auto Neutrophil % 82.8  / BANDS % x            134<L>  |  97<L>  |  10  ----------------------------<  111<H>  3.9   |  26  |  0.63    Ca    8.9      2022 13:38  TPro  7.6  /  Alb  3.4  /  TBili  1.0  /  DBili  x   /  AST  62<H>  /  ALT  49<H>  /  AlkPhos  720<H>  0613    PT/INR - ( 2022 13:38 )   PT: 16.2 sec;   INR: 1.39 ratio         PTT - ( 2022 13:38 )  PTT:33.3 sec      Urinalysis Basic - ( 2022 15:00 )    Color: Yellow / Appearance: Slightly Turbid / S.015 / pH: x  Gluc: x / Ketone: Negative  / Bili: Negative / Urobili: 3 mg/dL   Blood: x / Protein: Trace / Nitrite: Negative   Leuk Esterase: Negative / RBC: 4 /HPF / WBC 1 /HPF   Sq Epi: x / Non Sq Epi: 1 /HPF / Bacteria: Few            RADIOLOGY & ADDITIONAL TESTS:    Imaging Personally Reviewed:    Consultant(s) Notes Reviewed:      Care Discussed with Consultants/Other Providers:   Estuardo Alarcon, MS4 Sub-I  Department of Internal Medicine  Available on Microsoft Teams  Pager [phone]    Patient is a 52y old  Female who presents with a chief complaint of weakness/melena (2022 01:59)    OVERNIGHT EVENTS: No acute overnight events.    SUBJECTIVE: Pt seen and examined. Feels better, stilll has some RUQ pain. Denies fevers, chills, CP, SOB, N/V, Constipation, Diarrhea    MEDICATIONS  (STANDING):  pantoprazole  Injectable 40 milliGRAM(s) IV Push every 12 hours  piperacillin/tazobactam IVPB.. 3.375 Gram(s) IV Intermittent every 8 hours  sodium chloride 0.9%. 1000 milliLiter(s) (100 mL/Hr) IV Continuous <Continuous>      Vital Signs Last 24 Hrs  T(C): 37.2 (2022 03:44), Max: 38.3 (2022 11:40)  T(F): 99 (2022 03:44), Max: 100.9 (2022 11:40)  HR: 80 (2022 03:44) (70 - 97)  BP: 113/73 (2022 03:44) (98/71 - 131/77)  BP(mean): 83 (2022 03:44) (83 - 83)  RR: 18 (2022 03:44) (18 - 19)  SpO2: 100% (2022 03:44) (99% - 100%)    =================PHYSICAL EXAM=================    GENERAL: Laying comfortably, NAD  EYES: EOMI, PERRL, no scleral icterus  NECK: No JVD  LUNG: Clear to auscultation bilaterally; No wheeze, crackles or rhonci  HEART: Regular rate and rhythm; No murmurs, rubs, or gallops  ABDOMEN: RUQ TTP, + Chavez sign  EXTREMITIES:  No LE edema, 2+ Peripheral Pulses, No clubbing, cyanosis, or edema  PSYCH: AAOx3  NEUROLOGY: non-focal, strength 5/5 in all extremities, sensation intact  SKIN: No rashes or lesions    =================================================    LABS:                        8.9    16.66 )-----------( 608      ( 2022 13:38 )             27.0     Auto Eosinophil # 0.00  / Auto Eosinophil % 0.0   / Auto Neutrophil # 13.79 / Auto Neutrophil % 82.8  / BANDS % x            134<L>  |  97<L>  |  10  ----------------------------<  111<H>  3.9   |  26  |  0.63    Ca    8.9      2022 13:38  TPro  7.6  /  Alb  3.4  /  TBili  1.0  /  DBili  x   /  AST  62<H>  /  ALT  49<H>  /  AlkPhos  720<H>  06-13    PT/INR - ( 2022 13:38 )   PT: 16.2 sec;   INR: 1.39 ratio         PTT - ( 2022 13:38 )  PTT:33.3 sec      Urinalysis Basic - ( 2022 15:00 )    Color: Yellow / Appearance: Slightly Turbid / S.015 / pH: x  Gluc: x / Ketone: Negative  / Bili: Negative / Urobili: 3 mg/dL   Blood: x / Protein: Trace / Nitrite: Negative   Leuk Esterase: Negative / RBC: 4 /HPF / WBC 1 /HPF   Sq Epi: x / Non Sq Epi: 1 /HPF / Bacteria: Few            RADIOLOGY & ADDITIONAL TESTS:    Imaging Personally Reviewed:    Consultant(s) Notes Reviewed:      Care Discussed with Consultants/Other Providers:

## 2022-06-14 NOTE — H&P ADULT - PROBLEM SELECTOR PLAN 5
Neuroendocrine tumor from primary small intestine s/p resection with metastases to the liver   - on lanreotide monthly injection  - Imaging concern for worsening hepatic metastases and now new 4mm pulmonary nodule that may also be mets  - Oncologist: Neuroendocrine tumor from primary small intestine s/p resection with metastases to the liver   - on lanreotide monthly injection  - Imaging concern for worsening hepatic metastases and now new 4mm pulmonary nodule that may also be mets  - Oncologist: Dr. Whitmore at [facility] Platelets elevated to 699. Of note, were elevate to 520 in 9/2021  -Trend CBC - Mild CO2 elevation, likely erroneous as its a venous draw, repeat in AM to reassess

## 2022-06-14 NOTE — H&P ADULT - NSICDXPASTSURGICALHX_GEN_ALL_CORE_FT
PAST SURGICAL HISTORY:  H/O resection of small bowel 2014    S/P laparoscopy S/P Excsion left retroperitoneal mass 02/3/2014    
Walk in

## 2022-06-14 NOTE — PROGRESS NOTE ADULT - PROBLEM SELECTOR PLAN 2
- Reports melena  - PPI 40mg IV BID  - FOBT pending  - Maintain active type and screen  - 2 large bore IVS  - GI emailed - Reports melena  - PPI 40mg IV BID  - FOBT pending  - Maintain active type and screen  - 2 large bore IVS  - GI emailed, f/u GI recs

## 2022-06-14 NOTE — H&P ADULT - NSHPLABSRESULTS_GEN_ALL_CORE
8.9    16.66 )-----------( 608      ( 2022 13:38 )             27.0           134<L>  |  97<L>  |  10  ----------------------------<  111<H>  3.9   |  26  |  0.63    Ca    8.9      2022 13:38    TPro  7.6  /  Alb  3.4  /  TBili  1.0  /  DBili  x   /  AST  62<H>  /  ALT  49<H>  /  AlkPhos  720<H>                Urinalysis Basic - ( 2022 15:00 )    Color: Yellow / Appearance: Slightly Turbid / S.015 / pH: x  Gluc: x / Ketone: Negative  / Bili: Negative / Urobili: 3 mg/dL   Blood: x / Protein: Trace / Nitrite: Negative   Leuk Esterase: Negative / RBC: 4 /HPF / WBC 1 /HPF   Sq Epi: x / Non Sq Epi: 1 /HPF / Bacteria: Few        PT/INR - ( 2022 13:38 )   PT: 16.2 sec;   INR: 1.39 ratio         PTT - ( 2022 13:38 )  PTT:33.3 sec    13:38 - VBG - pH: 7.38  | pCO2: 53    | pO2: <20   | Lactate: 1.8        < from: CT Abdomen and Pelvis w/ IV Cont (22 @ 20:57) >      IMPRESSION:  Progression of hepatic metastatic disease when compared with previous   study of 3/12/2022.    Asymmetric gallbladder wall thickening/enhancement, including subtle   increased prominence of the common bile duct may be   infectious/inflammatory.    New nonspecific 4 mm right pulmonary nodule, concerning for metastasis.      < end of copied text >    < from: US Abdomen Upper Quadrant Right (22 @ 00:08) >    IMPRESSION:  Diffuse shadowing of the gallbladder limiting full evaluation, which   could represent multiple gallstones versus vicarious excretion of   contrast from earlier CT. If there is high clinical suspicion for   cholecystitis, HIDA scan may be performed.    Redemonstrated hepatic metastases.    < end of copied text >    < from: Xray Chest 1 View- PORTABLE-Urgent (Xray Chest 1 View- PORTABLE-Urgent .) (22 @ 14:03) >    IMPRESSION:  Clear lungs.    < end of copied text > LABS and ADDITIONAL STUDIES:                 8.9    16.66 )-----------( 608      ( 2022 13:38 )              27.0         134<L>  |  97<L>  |  10  ----------------------------<  111<H>  3.9   |  26  |  0.63    Ca    8.9      2022 13:38    TPro  7.6  /  Alb  3.4  /  TBili  1.0  /  DBili  x   /  AST  62<H>  /  ALT  49<H>  /  AlkPhos  720<H>      Urinalysis Basic - ( 2022 15:00 )  Color: Yellow / Appearance: Slightly Turbid / S.015 / pH: x  Gluc: x / Ketone: Negative  / Bili: Negative / Urobili: 3 mg/dL   Blood: x / Protein: Trace / Nitrite: Negative   Leuk Esterase: Negative / RBC: 4 /HPF / WBC 1 /HPF   Sq Epi: x / Non Sq Epi: 1 /HPF / Bacteria: Few    PT/INR - ( 2022 13:38 )   PT: 16.2 sec;   INR: 1.39 ratio     PTT - ( 2022 13:38 )  PTT:33.3 sec    13:38 - VBG - pH: 7.38  | pCO2: 53    | pO2: <20   | Lactate: 1.8      < from: CT Abdomen and Pelvis w/ IV Cont (22 @ 20:57) >  IMPRESSION:  Progression of hepatic metastatic disease when compared with previous study of 3/12/2022.    Asymmetric gallbladder wall thickening/enhancement, including subtle increased prominence of the common bile duct may be infectious/inflammatory.    New nonspecific 4 mm right pulmonary nodule, concerning for metastasis.  < end of copied text >    < from: US Abdomen Upper Quadrant Right (22 @ 00:08) >  IMPRESSION:  Diffuse shadowing of the gallbladder limiting full evaluation, which could represent multiple gallstones versus vicarious excretion of contrast from earlier CT. If there is high clinical suspicion for cholecystitis, HIDA scan may be performed.    Redemonstrated hepatic metastases.  < end of copied text >    < from: Xray Chest 1 View- PORTABLE-Urgent (Xray Chest 1 View- PORTABLE-Urgent .) (22 @ 14:03) >  IMPRESSION:  Clear lungs.  < end of copied text >    EKG - NSR at 81, QTc 383, no significant ST-T wave changes

## 2022-06-14 NOTE — H&P ADULT - PROBLEM SELECTOR PLAN 6
DVT ppx: SCDs in s/o reported melena  Diet: DVT ppx: SCDs in s/o reported melena  Diet: NPO for now pending GI recs. NSx10 hours while NPO Neuroendocrine tumor from primary small intestine s/p resection with metastases to the liver   - on lanreotide monthly injection  - Imaging concern for worsening hepatic metastases and now new 4mm pulmonary nodule that may also be mets  - Oncologist: Dr. Whitmore at [facility]  - Consider sooner appointment on discharge. Neuroendocrine tumor from primary small intestine s/p resection with metastases to the liver   - on lanreotide monthly injection  - Imaging concern for worsening hepatic metastases and now new 4mm pulmonary nodule that may also be mets  - Oncologist: Dr. Whitmore at [facility]  - Consider sooner appointment on discharge.  - LFT elevation possibly 2/2 worsening hepatic mets, monitor LFTs Platelets elevated to 699. Of note, were elevate to 520 in 9/2021  -Trend CBC

## 2022-06-14 NOTE — PATIENT PROFILE ADULT - FALL HARM RISK - HARM RISK INTERVENTIONS

## 2022-06-14 NOTE — H&P ADULT - PROBLEM SELECTOR PLAN 4
Platelets elevated to 699. Of note, were elevate to 520 in 9/2021  - Platelets elevated to 699. Of note, were elevate to 520 in 9/2021  -Trend CBC Hgb to 8.9 on admission.  Baseline 10-11 last year. Possibly in s/o acute blood loss, lanreotide can also cause anemia.   - Trend CBC QD for now. STAT CBC with melanotic/BRBPR stools  - maintain active type and screen  - iron studies, ferritin Hgb to 8.9 on admission.  Baseline 10-11 last year. Possibly in s/o acute blood loss, lanreotide can also cause anemia.   - Trend CBC QD for now. STAT CBC with melanotic/BRBPR stools  - maintain active type and screen  - iron studies, ferritin, retic count, FOBT

## 2022-06-14 NOTE — PROGRESS NOTE ADULT - PROBLEM SELECTOR PLAN 1
Temp to 100.9 in ED. WBC to 16.  s/p zosynx1 in ED  -CT imaging with dilated 8mm CBD and gallbladder wall thickening. RUQ US with limited evaluation.  - Patient also reporting diarrhea, last on Thursday.  - per surgery, low concern for acute cholecystitis  - f/u MRCP  ordered for further eval   - blood culturesx2 pending  -urine culture pending  - CXR with clear lungs.   - f/u MRSA swab  - f/u GI PCR, stool O and P. Order Cdiff if worsening diarrhea.   - c/w empiric abx: Zosyn Temp to 100.9 in ED. WBC to 16.  s/p zosynx1 in ED  -CT imaging with dilated 8mm CBD and gallbladder wall thickening. RUQ US with limited evaluation.  - Patient also reporting diarrhea, last on Thursday (last week)  - per surgery, low concern for acute cholecystitis  - CXR clear lungs    ====PLAN======  - c/w empiric abx: Zosyn  - f/u MRCP  - f/u BCx x2, UCx, MRSA swab  - f/u GI PCR, stool O and P

## 2022-06-14 NOTE — H&P ADULT - ASSESSMENT
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 Now with concern for GIB, SIRS, and worsening metastatic disease.

## 2022-06-14 NOTE — PROGRESS NOTE ADULT - PROBLEM SELECTOR PLAN 3
r/o infectious source. Lanreotide can also cause diarrrhea. Last episode on Thursday  -f/u GI PCR, stool O and P. Order Cdiff if worsening diarrhea. r/o infectious source. Lanreotide can also cause diarrrhea. Last episode on Thursday  - f/u GI PCR, stool O and P. Order Cdiff if worsening diarrhea.

## 2022-06-14 NOTE — PROGRESS NOTE ADULT - ATTENDING COMMENTS
Patient seen and examined, care d/w HS6.    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 Now with concern for GIB, SIRS, and worsening metastatic disease.     # SIRS  # anemia, question of intermittent melena  # Neuroendocrine tumor, POD  # elevated LFTs/Alk phos    - f/u cultures, c/w zosyn  - f/u GI, currently not having melanotic stool, trend Hb  - intermittent RUQ with elevated LFTs/alkphos may be POD vs biliary obstruction given ductal dilation, pending MRCP  - neuroendocrine tumor with POD, unclear if other DMT available, f/u onc

## 2022-06-14 NOTE — PROGRESS NOTE ADULT - ASSESSMENT
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 Now with concern for GIB, SIRS, and worsening metastatic disease.

## 2022-06-15 ENCOUNTER — TRANSCRIPTION ENCOUNTER (OUTPATIENT)
Age: 52
End: 2022-06-15

## 2022-06-15 VITALS — WEIGHT: 87.08 LBS

## 2022-06-15 LAB
ALBUMIN SERPL ELPH-MCNC: 2.9 G/DL — LOW (ref 3.3–5)
ALP SERPL-CCNC: 705 U/L — HIGH (ref 40–120)
ALT FLD-CCNC: 35 U/L — HIGH (ref 4–33)
ANION GAP SERPL CALC-SCNC: 10 MMOL/L — SIGNIFICANT CHANGE UP (ref 7–14)
APTT BLD: 33.7 SEC — SIGNIFICANT CHANGE UP (ref 27–36.3)
AST SERPL-CCNC: 36 U/L — HIGH (ref 4–32)
BASOPHILS # BLD AUTO: 0.06 K/UL — SIGNIFICANT CHANGE UP (ref 0–0.2)
BASOPHILS NFR BLD AUTO: 0.4 % — SIGNIFICANT CHANGE UP (ref 0–2)
BILIRUB SERPL-MCNC: 0.7 MG/DL — SIGNIFICANT CHANGE UP (ref 0.2–1.2)
BUN SERPL-MCNC: 9 MG/DL — SIGNIFICANT CHANGE UP (ref 7–23)
CALCIUM SERPL-MCNC: 8.8 MG/DL — SIGNIFICANT CHANGE UP (ref 8.4–10.5)
CHLORIDE SERPL-SCNC: 100 MMOL/L — SIGNIFICANT CHANGE UP (ref 98–107)
CO2 SERPL-SCNC: 27 MMOL/L — SIGNIFICANT CHANGE UP (ref 22–31)
CREAT SERPL-MCNC: 0.67 MG/DL — SIGNIFICANT CHANGE UP (ref 0.5–1.3)
EGFR: 104 ML/MIN/1.73M2 — SIGNIFICANT CHANGE UP
EOSINOPHIL # BLD AUTO: 0.16 K/UL — SIGNIFICANT CHANGE UP (ref 0–0.5)
EOSINOPHIL NFR BLD AUTO: 1.1 % — SIGNIFICANT CHANGE UP (ref 0–6)
GLUCOSE SERPL-MCNC: 118 MG/DL — HIGH (ref 70–99)
HCT VFR BLD CALC: 28.6 % — LOW (ref 34.5–45)
HGB BLD-MCNC: 9.3 G/DL — LOW (ref 11.5–15.5)
IANC: 10.88 K/UL — HIGH (ref 1.8–7.4)
IMM GRANULOCYTES NFR BLD AUTO: 0.4 % — SIGNIFICANT CHANGE UP (ref 0–1.5)
INR BLD: 1.41 RATIO — HIGH (ref 0.88–1.16)
LYMPHOCYTES # BLD AUTO: 1.74 K/UL — SIGNIFICANT CHANGE UP (ref 1–3.3)
LYMPHOCYTES # BLD AUTO: 12.3 % — LOW (ref 13–44)
MAGNESIUM SERPL-MCNC: 2 MG/DL — SIGNIFICANT CHANGE UP (ref 1.6–2.6)
MCHC RBC-ENTMCNC: 21 PG — LOW (ref 27–34)
MCHC RBC-ENTMCNC: 32.5 GM/DL — SIGNIFICANT CHANGE UP (ref 32–36)
MCV RBC AUTO: 64.6 FL — LOW (ref 80–100)
MONOCYTES # BLD AUTO: 1.2 K/UL — HIGH (ref 0–0.9)
MONOCYTES NFR BLD AUTO: 8.5 % — SIGNIFICANT CHANGE UP (ref 2–14)
MRSA PCR RESULT.: SIGNIFICANT CHANGE UP
NEUTROPHILS # BLD AUTO: 10.88 K/UL — HIGH (ref 1.8–7.4)
NEUTROPHILS NFR BLD AUTO: 77.3 % — HIGH (ref 43–77)
NRBC # BLD: 0 /100 WBCS — SIGNIFICANT CHANGE UP
NRBC # FLD: 0 K/UL — SIGNIFICANT CHANGE UP
PHOSPHATE SERPL-MCNC: 3.3 MG/DL — SIGNIFICANT CHANGE UP (ref 2.5–4.5)
PLATELET # BLD AUTO: 610 K/UL — HIGH (ref 150–400)
POTASSIUM SERPL-MCNC: 3.8 MMOL/L — SIGNIFICANT CHANGE UP (ref 3.5–5.3)
POTASSIUM SERPL-SCNC: 3.8 MMOL/L — SIGNIFICANT CHANGE UP (ref 3.5–5.3)
PROT SERPL-MCNC: 7 G/DL — SIGNIFICANT CHANGE UP (ref 6–8.3)
PROTHROM AB SERPL-ACNC: 16.4 SEC — HIGH (ref 10.5–13.4)
RBC # BLD: 4.43 M/UL — SIGNIFICANT CHANGE UP (ref 3.8–5.2)
RBC # FLD: 18.9 % — HIGH (ref 10.3–14.5)
S AUREUS DNA NOSE QL NAA+PROBE: SIGNIFICANT CHANGE UP
SODIUM SERPL-SCNC: 137 MMOL/L — SIGNIFICANT CHANGE UP (ref 135–145)
WBC # BLD: 14.1 K/UL — HIGH (ref 3.8–10.5)
WBC # FLD AUTO: 14.1 K/UL — HIGH (ref 3.8–10.5)

## 2022-06-15 PROCEDURE — 99255 IP/OBS CONSLTJ NEW/EST HI 80: CPT

## 2022-06-15 PROCEDURE — 99239 HOSP IP/OBS DSCHRG MGMT >30: CPT | Mod: GC

## 2022-06-15 RX ORDER — CLOTRIMAZOLE 10 MG
1 TROCHE MUCOUS MEMBRANE
Qty: 15 | Refills: 0
Start: 2022-06-15 | End: 2022-06-17

## 2022-06-15 RX ORDER — POLYETHYLENE GLYCOL 3350 17 G/17G
17 POWDER, FOR SOLUTION ORAL DAILY
Refills: 0 | Status: DISCONTINUED | OUTPATIENT
Start: 2022-06-15 | End: 2022-06-15

## 2022-06-15 RX ORDER — PANTOPRAZOLE SODIUM 20 MG/1
1 TABLET, DELAYED RELEASE ORAL
Qty: 30 | Refills: 0
Start: 2022-06-15 | End: 2022-07-14

## 2022-06-15 RX ORDER — ENOXAPARIN SODIUM 100 MG/ML
30 INJECTION SUBCUTANEOUS EVERY 24 HOURS
Refills: 0 | Status: DISCONTINUED | OUTPATIENT
Start: 2022-06-15 | End: 2022-06-15

## 2022-06-15 RX ORDER — PANTOPRAZOLE SODIUM 20 MG/1
40 TABLET, DELAYED RELEASE ORAL
Refills: 0 | Status: DISCONTINUED | OUTPATIENT
Start: 2022-06-15 | End: 2022-06-15

## 2022-06-15 RX ORDER — SENNA PLUS 8.6 MG/1
2 TABLET ORAL AT BEDTIME
Refills: 0 | Status: DISCONTINUED | OUTPATIENT
Start: 2022-06-15 | End: 2022-06-15

## 2022-06-15 RX ADMIN — PANTOPRAZOLE SODIUM 40 MILLIGRAM(S): 20 TABLET, DELAYED RELEASE ORAL at 05:08

## 2022-06-15 RX ADMIN — Medication 1 LOZENGE: at 10:41

## 2022-06-15 RX ADMIN — Medication 1 LOZENGE: at 03:27

## 2022-06-15 RX ADMIN — Medication 1 LOZENGE: at 18:27

## 2022-06-15 RX ADMIN — Medication 1 LOZENGE: at 13:29

## 2022-06-15 RX ADMIN — PIPERACILLIN AND TAZOBACTAM 25 GRAM(S): 4; .5 INJECTION, POWDER, LYOPHILIZED, FOR SOLUTION INTRAVENOUS at 05:08

## 2022-06-15 NOTE — CONSULT NOTE ADULT - ASSESSMENT
53 y/o F with history of neuroendocrine tumor from primary small intestine s/p resection with subsequent metastases to the liver, currently on lanreotide monthly injection, last on 6/2/22. Patient previously had radiation therapy last year.  She now presents to the ED for progressively worsening generalized weakness over the past month. Patient also reports episodes of melena, and associated malaise and decreased PO intake. Patient also reports chronic diarrhea  since last year, but recently worsened, dark in color and associated with increased weakness.    -Met SIRS criteria and treated with IVF, empiric antibiotics  -Afebrile with negative cultures  -No further diarrhea and stable HGB  -With no evidence of infection or acute GI bleed agree with discharge for outpatient follow up.  -Will follow up with her primary oncologist Dr. Whitmore at [facility].  -As per patient she will be starting new treatment with peptide receptor radionuclide therapy (PRRT) on 6/30.     Please call with any questions.    Sarah Cannon MD  Medical Oncology Attending  [phone]
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 Buster [facility] TE[facility] SURGERY  [id number]

## 2022-06-15 NOTE — DIETITIAN INITIAL EVALUATION ADULT - PERTINENT MEDS FT
MEDICATIONS  (STANDING):  clotrimazole Lozenge 1 Lozenge Oral five times a day  enoxaparin Injectable 40 milliGRAM(s) SubCutaneous every 24 hours  pantoprazole    Tablet 40 milliGRAM(s) Oral before breakfast  polyethylene glycol 3350 17 Gram(s) Oral daily  senna 2 Tablet(s) Oral at bedtime    MEDICATIONS  (PRN):

## 2022-06-15 NOTE — DIETITIAN INITIAL EVALUATION ADULT - PERTINENT LABORATORY DATA
06-15    137  |  100  |  9   ----------------------------<  118<H>  3.8   |  27  |  0.67    Ca    8.8      15 Woo 2022 05:13  Phos  3.3     06-15  Mg     2.00     06-15    TPro  7.0  /  Alb  2.9<L>  /  TBili  0.7  /  DBili  x   /  AST  36<H>  /  ALT  35<H>  /  AlkPhos  705<H>  06-15

## 2022-06-15 NOTE — PROGRESS NOTE ADULT - PROBLEM SELECTOR PLAN 3
Lanreotide can also cause diarrrhea. Last episode on Thursday  - no more diarrhea inpatient, stool studies not collected d/t lack of BM Lanreotide can also cause diarrhea. Last episode on Thursday  - no more diarrhea inpatient, stool studies not collected d/t lack of BM

## 2022-06-15 NOTE — DIETITIAN INITIAL EVALUATION ADULT - ADD RECOMMEND
Continue diet as ordered. Will provide Orgain BID (440 kcal, 32 g pro). Consider multivitamin for micronutrient repletion. Obtain weekly weight and document PO intake to monitor trend.

## 2022-06-15 NOTE — DIETITIAN INITIAL EVALUATION ADULT - OTHER INFO
52 year old female with a PMH of neuroendocrine tumor from primary small intestine s/p resection with metastases to the liver (on lanreotide monthly injection), who presents to the ED for progressively worsening generalized weakness over the past month. Now with concern for GIB, SIRS, and worsening metastatic disease per chart.    Patient reports poor appetite in house. Amenable to try Orgain (220 kcal, 16 g pro) as she reports not tolerating ensure supplements, but was unsure of which type of ensure it was. No GI distress reported at this time. No chewing/swallowing difficulties reported. Has no food allergies, but avoids beef/pork per preference. Reports UBW is 118 lbs. x 6 months. ABW is 87 lbs. (6/14) per chart indicating a -26.3% weight loss, significant. No edema or pressure injuries noted per RN flow sheet.    Educated patient on small frequent meals, reviewed protein rich foods, reviewed high fat foods, foods that aid with diarrhea and importance of oral nutritional supplements for weight stability. Suggested patient keep food diary of foods that trigger diarrhea to prevent over restriction of food groups.

## 2022-06-15 NOTE — DISCHARGE NOTE NURSING/CASE MANAGEMENT/SOCIAL WORK - PATIENT PORTAL LINK FT
You can access the FollowMyHealth Patient Portal offered by [facility] by registering at the following website: [email or web address]. By joining [facility]’s FollowMyHealth portal, you will also be able to view your health information using other applications (apps) compatible with our system.

## 2022-06-15 NOTE — PROGRESS NOTE ADULT - PROBLEM SELECTOR PLAN 5
Platelets elevated to 699. Of note, were elevate to 520 in 9/2021  -Trend CBC
Platelets elevated to 699. Of note, were elevate to 520 in 9/2021  -Trend CBC

## 2022-06-15 NOTE — DISCHARGE NOTE NURSING/CASE MANAGEMENT/SOCIAL WORK - NSDCPNINST_GEN_ALL_CORE
Please NOTIFY MD for any of the following s/s: S/S infection (Fever >100.4, chills, uncontrolled pain not relieved by pain medications, persistent nausea/vomiting or inability to tolerate diet.

## 2022-06-15 NOTE — CONSULT NOTE ADULT - SUBJECTIVE AND OBJECTIVE BOX
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 Patient also reports episode of melena, and associated malaise and decreased PO intake. States last melanotic episode was last Tuesday, however, before then was having multiple dark black stools (estimates about 20 in total). Patient also endorses chronic diarrhea  since last year, however states that last week she was having multiple episodes of diarrhea and they were not similar to prior, said that she had received her lanreotide injection prior to the start her of diarrhea. Patient states that diarrhea has since stopped as of Thursday. Reports last BM was yesterday, was normal in color and soft in consistency. She denies CP, SOB, subjective f/c, n/v.    In regards to her cancer, patient follows with Dr. Whitmore at [facility]. Last got lanreotide on 6/2 (next due 7/1). Also reports that she is pending peptide receptor radionuclide therapy (PRRT)  on 6/30. Patient previously had radiation therapy last year.      In the ED, labs significant for WBC 16, Hgb 8.9, platelet 608, Alk phos 20, ALT 49, AST 62.  Vital signs with Tmax: 100.9, HR:70 - 97, BP: 98/71 - 131/77. CTAP with evidence of dilated CBD and new 4mm right pulmonary nodule and progression of hepatic metastases. Given zosyn x1, NS  bolus, and tylenolx1.  (14 Jun 2022 01:59)      PAST MEDICAL & SURGICAL HISTORY:  Vertigo      Neoplasm of digestive system      Malignant neoplasm of small intestine      Anemia      S/P laparoscopy  S/P Excsion left retroperitoneal mass 02/3/2014      H/O resection of small bowel  2014          Allergies    No Known Allergies    Intolerances        MEDICATIONS  (STANDING):  clotrimazole Lozenge 1 Lozenge Oral five times a day  enoxaparin Injectable 40 milliGRAM(s) SubCutaneous every 24 hours  pantoprazole    Tablet 40 milliGRAM(s) Oral before breakfast  polyethylene glycol 3350 17 Gram(s) Oral daily  senna 2 Tablet(s) Oral at bedtime    MEDICATIONS  (PRN):      FAMILY HISTORY:      SOCIAL HISTORY: No EtOH, no tobacco    REVIEW OF SYSTEMS:    CONSTITUTIONAL: No weakness, fevers or chills  EYES/ENT: No visual changes;  No vertigo or throat pain   NECK: No pain or stiffness  RESPIRATORY: No cough, wheezing, hemoptysis; No shortness of breath  CARDIOVASCULAR: No chest pain or palpitations  GASTROINTESTINAL: No abdominal or epigastric pain. No nausea, vomiting, or hematemesis; No diarrhea or constipation. No melena or hematochezia.  GENITOURINARY: No dysuria, frequency or hematuria  NEUROLOGICAL: No numbness or weakness  SKIN: No itching, burning, rashes, or lesions   All other review of systems is negative unless indicated above.      Weight (kg): 39.5 (06-14 @ 21:09)    T(F): 99.1 (06-15-22 @ 14:01), Max: 100.6 (06-14-22 @ 21:10)  HR: 75 (06-15-22 @ 14:01)  BP: 114/69 (06-15-22 @ 14:01)  RR: 16 (06-15-22 @ 14:01)  SpO2: 99% (06-15-22 @ 14:01)  Wt(kg): --      PHYSICAL EXAMINATION    GENERAL: NAD, Thin  HEAD:  Atraumatic, Normocephalic  EYES: EOMI, PERRLA, conjunctiva and sclera clear  NECK: Supple, No JVD  CHEST/LUNG: Clear to auscultation bilaterally; No wheeze  HEART: Regular rate and rhythm; No murmurs, rubs, or gallops  ABDOMEN: Soft, Nontender, Nondistended; Bowel sounds present  EXTREMITIES:  2+ Peripheral Pulses, No clubbing, cyanosis, or edema  NEUROLOGY: non-focal  SKIN: No rashes or lesions        LABS                      9.3    14.10 )-----------( 610      ( 15 Woo 2022 05:13 )             28.6       06-15    137  |  100  |  9   ----------------------------<  118<H>  3.8   |  27  |  0.67    Ca    8.8      15 Woo 2022 05:13  Phos  3.3     06-15  Mg     2.00     06-15    TPro  7.0  /  Alb  2.9<L>  /  TBili  0.7  /  DBili  x   /  AST  36<H>  /  ALT  35<H>  /  AlkPhos  705<H>  06-15      Magnesium, Serum: 2.00 mg/dL (06-15 @ 05:13)  Phosphorus Level, Serum: 3.3 mg/dL (06-15 @ 05:13)      PT/INR - ( 15 Woo 2022 05:13 )   PT: 16.4 sec;   INR: 1.41 ratio         PTT - ( 15 Woo 2022 05:13 )  PTT:33.7 sec    RADIOLOGY

## 2022-06-15 NOTE — PROGRESS NOTE ADULT - PROBLEM SELECTOR PLAN 2
- Reports melena  - pt with no episodes of bloody/black stool in the hospital  - Hgb stable  - GI evaluted - no endoscopy inpatient  - Maintain active type and screen  - 2 large bore IVS pt with no episodes of bloody/black stool in the hospital  - Hgb stable, no BMs here   - GI evaluated - no endoscopy inpatient

## 2022-06-15 NOTE — PROGRESS NOTE ADULT - SUBJECTIVE AND OBJECTIVE BOX
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 Patient also reports episode of melena, and associated malaise and decreased PO intake. States last melanotic episode was last Tuesday, however, before then was having multiple dark black stools (estimates about 20 in total). Patient also endorses chronic diarrhea  since last year, however states that last week she was having multiple episodes of diarrhea and they were not similar to prior, said that she had received her lanreotide injection prior to the start her of diarrhea. Patient states that diarrhea has since stopped as of Thursday. Reports last BM was yesterday, was normal in color and soft in consistency. She denies CP, SOB, subjective f/c, n/v.    In regards to her cancer, patient follows with Dr. Whitmore at [facility]. Last got lanreotide on 6/2 (next due 7/1). Also reports that she is pending peptide receptor radionuclide therapy (PRRT)  on 6/30. Patient previously had radiation therapy last year.      In the ED, labs significant for WBC 16, Hgb 8.9, platelet 608, Alk phos 20, ALT 49, AST 62.  Vital signs with Tmax: 100.9, HR:70 - 97, BP: 98/71 - 131/77. CTAP with evidence of dilated CBD and new 4mm right pulmonary nodule and progression of hepatic metastases. Given zosynx1, NS  bolus, and tylenolx1.  (14 Jun 2022 01:59)      PAST MEDICAL & SURGICAL HISTORY:  Vertigo      Neoplasm of digestive system      Malignant neoplasm of small intestine      Anemia      S/P laparoscopy  S/P Excsion left retroperitoneal mass 02/3/2014      H/O resection of small bowel  2014          MEDICATIONS  (STANDING):  clotrimazole Lozenge 1 Lozenge Oral five times a day  pantoprazole  Injectable 40 milliGRAM(s) IV Push every 12 hours  piperacillin/tazobactam IVPB.. 3.375 Gram(s) IV Intermittent every 8 hours      Allergies    No Known Allergies    Intolerances        SOCIAL HISTORY:  Denies ETOh,Smoking,     FAMILY HISTORY:      REVIEW OF SYSTEMS:    CONSTITUTIONAL: No weakness, fevers or chills  EYES/ENT: No visual changes;  No vertigo or throat pain   NECK: No pain or stiffness  RESPIRATORY: No cough, wheezing, hemoptysis; No shortness of breath  CARDIOVASCULAR: No chest pain or palpitations  GASTROINTESTINAL: No abdominal or epigastric pain. No nausea, vomiting, or hematemesis; No diarrhea or constipation. No melena or hematochezia.  GENITOURINARY: No dysuria, frequency or hematuria  NEUROLOGICAL: No numbness or weakness  SKIN: No itching, burning, rashes, or lesions   All other review of systems is negative unless indicated above.    VITAL:  T(C): , Max: 38.1 (06-14-22 @ 21:10)  T(F): , Max: 100.6 (06-14-22 @ 21:10)  HR: 80 (06-14-22 @ 21:10)  BP: 119/64 (06-14-22 @ 21:10)  BP(mean): --  RR: 18 (06-14-22 @ 21:10)  SpO2: 100% (06-14-22 @ 21:10)  Wt(kg): --    I and O's:    06-14 @ 07:01  -  06-15 @ 05:58  --------------------------------------------------------  IN: 1250 mL / OUT: 0 mL / NET: 1250 mL          PHYSICAL EXAM:    Constitutional: NAD  HEENT: PERRLA,   Neck: No JVD  Respiratory: CTA B/L  Cardiovascular: S1 and S2  Gastrointestinal: BS+, soft, NT/ND  Extremities: No peripheral edema  Neurological: A/O x 3, no focal deficits  Psychiatric: Normal mood, normal affect  : No Gerber  Skin: No rashes  Access: Not applicable  Back: No CVA tenderness    LABS:                        10.0   18.10 )-----------( 609      ( 14 Jun 2022 13:19 )             31.1     06-14    137  |  101  |  7   ----------------------------<  92  4.0   |  26  |  0.55    Ca    8.8      14 Jun 2022 13:18  Phos  3.4     06-14  Mg     2.00     06-14    TPro  7.4  /  Alb  3.2<L>  /  TBili  0.9  /  DBili  0.4<H>  /  AST  42<H>  /  ALT  42<H>  /  AlkPhos  731<H>  06-14          RADIOLOGY & ADDITIONAL STUDIES:

## 2022-06-15 NOTE — DISCHARGE NOTE PROVIDER - NSDCMRMEDTOKEN_GEN_ALL_CORE_FT
lanreotide: 1 dose(s) subcutaneous once a month   clotrimazole 10 mg oral lozenge: 1 lozenge orally 5 times a day  lanreotide: 1 dose(s) subcutaneous once a month  pantoprazole 40 mg oral delayed release tablet: 1 tab(s) orally once a day (before a meal)

## 2022-06-15 NOTE — PROGRESS NOTE ADULT - PROBLEM SELECTOR PLAN 1
Temp to 100.9 in ED. WBC to 16.  s/p zosynx1 in ED  -CT imaging with dilated 8mm CBD and gallbladder wall thickening. RUQ US with limited evaluation.  - Patient also reporting diarrhea, last on Thursday (last week)  - per surgery, low concern for acute cholecystitis  - CXR clear lungs  - no signs of infection, BCx NGTD, UCx NGTD  - MRCP: liver mets, no signs of acute cholecystitis or infection  - d/c Zosyn  - f/u oncology recs    ====PLAN======  - c/w empiric abx: Zosyn  - f/u MRCP  - f/u BCx x2, UCx, MRSA swab  - f/u GI PCR, stool O and P Temp to 100.9 in ED. WBC to 16.  s/p zosynx1 in ED  - CT imaging with dilated 8mm CBD and gallbladder wall thickening. RUQ US with limited evaluation.  - patient w/o localizing complaint  - blood and urine cx NG; CXR clear lungs  - MRCP: liver mets, no signs of acute cholecystitis or infection  - d/c Zosyn  - f/u oncology recs  - may be noninfectious SIRS as no overt source identified, ?tumor fevers  - no further diarrhea, no BM since here    ====PLAN======  - s/p zosyn, dc given neg cx  - MRCP liver mets, no biliary obstruction   - blood and urine cx neg  - stool studies not sent 2/2 no diarrhea

## 2022-06-15 NOTE — DISCHARGE NOTE PROVIDER - NSDCFUADDAPPT_GEN_ALL_CORE_FT
Please follow up with your oncologist Dr. Whitmore 1-2 weeks after discharge. Please follow up with your oncologist Dr. Whitmore 1-2 weeks after discharge.    Please follow up with your GI doctor (Dr. Patel) 1-2 weeks after discharge.

## 2022-06-15 NOTE — DISCHARGE NOTE NURSING/CASE MANAGEMENT/SOCIAL WORK - NSDCFUADDAPPT_GEN_ALL_CORE_FT
Please follow up with your oncologist Dr. Whitmore 1-2 weeks after discharge.    Please follow up with your GI doctor (Dr. Patel) 1-2 weeks after discharge.

## 2022-06-15 NOTE — PROGRESS NOTE ADULT - PROBLEM SELECTOR PLAN 6
Neuroendocrine tumor from primary small intestine s/p resection with metastases to the liver   - on lanreotide monthly injection  - Imaging concern for worsening hepatic metastases and now new 4mm pulmonary nodule that may also be mets  - Oncologist: Dr. Whitmore at [facility]  - Consider sooner appointment on discharge.
Neuroendocrine tumor from primary small intestine s/p resection with metastases to the liver   - on lanreotide monthly injection  - Imaging concern for worsening hepatic metastases and now new 4mm pulmonary nodule that may also be mets  - Oncologist: Dr. Whitmore at [facility] f/u onc recs

## 2022-06-15 NOTE — PROGRESS NOTE ADULT - PROBLEM SELECTOR PLAN 7
DVT ppx: SCDs in s/o reported melena.   Diet: Regular DVT ppx: SCDs in s/o reported melena given stable hb, lovenox ppx  Diet: Regular

## 2022-06-15 NOTE — PROGRESS NOTE ADULT - ATTENDING COMMENTS
Patient seen and examined, care d/w HS6.    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 Now with concern for GIB, SIRS, and worsening metastatic disease.     # SIRS  # anemia, question of intermittent melena  # Neuroendocrine tumor, POD  # elevated LFTs/Alk phos    - cultures neg, nontoxic, s/p zosyn now dc as no infectious nidus found, noninfectious SIRS 2/2 tumor fever   - GI saw, no plan for endoscopic eval, currently not having melanotic stool, Hb stable  - intermittent RUQ with elevated LFTs/alkphos may be POD vs biliary obstruction given ductal dilation, MRCP neg for obstruction   - neuroendocrine tumor with POD, unclear if other DMT available, states was referred to [facility] for new tx, f/u onc recs    Dispo planning  possible dc home pending onc assessment  dispo time 33 min Patient seen and examined, care d/w HS6.    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 Now with concern for GIB, SIRS, and worsening metastatic disease.     # SIRS, noninfectious   # anemia, question of intermittent melena  # Neuroendocrine tumor, POD, liver mets  # elevated LFTs/Alk phos  # Severe protein Malnutrition     - cultures neg, nontoxic, s/p zosyn now dc as no infectious nidus found, noninfectious SIRS 2/2 tumor fever due to liver mets/POD, pt reports feels at her baseline, reports has been feeling "hot" at home intermittently for months   - GI saw, no plan for endoscopic eval, currently not having melanotic stool (no stool in fact, +flatus, no abdominal pain), Hb stable  - intermittent RUQ with elevated LFTs/alkphos MRCP confirms POD, no biliary obstruction despite ductal dilation, seen by surgery no suspicion for  mark   - neuroendocrine tumor with POD, unclear if other DMT available, states was referred to [facility] by Dr. Whitmore for new tx, f/u onc recs    full code  Dispo planning  possible dc home pending onc assessment  dispo time 33 min Patient seen and examined, care d/w HS6.    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 Now with concern for GIB, SIRS, and worsening metastatic disease.     # SIRS, noninfectious   # anemia, question of intermittent melena  # Neuroendocrine tumor, POD, liver mets  # elevated LFTs/Alk phos  # Severe protein Malnutrition, BMP 15.9, 30lb weight loss over year    - cultures neg, nontoxic, s/p zosyn now dc as no infectious nidus found, noninfectious SIRS 2/2 tumor fever due to liver mets/POD, pt reports feels at her baseline, reports has been feeling "hot" at home intermittently for months   - GI saw, no plan for endoscopic eval, currently not having melanotic stool (no stool in fact now constipated, +flatus, no abdominal pain), Hb stable, on bowel regimen  - intermittent RUQ pain (more in rib area) with elevated LFTs/alkphos MRCP confirms POD, no biliary obstruction despite ductal dilation, seen by surgery no suspicion for  mark   - neuroendocrine tumor with POD, unclear if other DMT available, states was referred to [facility] by Dr. Whitmore for new tx, f/u onc recs    full code  Dispo planning  possible dc home pending onc assessment  dispo time 33 min

## 2022-06-15 NOTE — DIETITIAN INITIAL EVALUATION ADULT - ORAL INTAKE PTA/DIET HISTORY
Patient seen for assessment. Reports poor appetite >1 month in setting of catabolic illness and chronic diarrhea resulting in weight loss. Reports trying to consume small meals, and has been restricting certain food groups to prevent diarrhea.

## 2022-06-15 NOTE — PROGRESS NOTE ADULT - ASSESSMENT
conservative gi magnemnt  no acute gi complaints  surgery no opertion  no sign of overt bleed  risk > beneift endospic evaluation  pt without acute complaints. workup fever and wbc underway  ? gb

## 2022-06-15 NOTE — DISCHARGE NOTE PROVIDER - DETAILS OF MALNUTRITION DIAGNOSIS/DIAGNOSES
This patient has been assessed with a concern for Malnutrition and was treated during this hospitalization for the following Nutrition diagnosis/diagnoses:     -  06/15/2022: Severe protein-calorie malnutrition   -  06/15/2022: Underweight (BMI < 19)

## 2022-06-15 NOTE — DISCHARGE NOTE NURSING/CASE MANAGEMENT/SOCIAL WORK - NSDCPEFALRISK_GEN_ALL_CORE
For information on Fall & Injury Prevention, visit: [email or web address] OR  [email or web address] OR  https://www.cdc.gov/steadi/patient.html

## 2022-06-15 NOTE — DISCHARGE NOTE PROVIDER - NSDCCPCAREPLAN_GEN_ALL_CORE_FT
PRINCIPAL DISCHARGE DIAGNOSIS  Diagnosis: Weakness generalized  Assessment and Plan of Treatment: You came in to the hospital because you felt weak. You were founfd to have fevers. You given antibiotics. Blood tests were taken which did not show any signs of infection. An MRI was done which shows metastatic lesions in your liver from your cancer. The cancer doctors saw you while you were in the hospital and recommended ______. Please follow up with your oncologist 1-2 weeks after discharge. Please come back to the emergency room if you have fevers, chest pain, SOB, worsening abdominal pain.        SECONDARY DISCHARGE DIAGNOSES  Diagnosis: GIB (gastrointestinal bleeding)  Assessment and Plan of Treatment: You had diarrhea and dark stools. Your blood count was low on admission. The GI doctors were called due to a concern of a possible bleed. Your blood counts were stable throughout the admission and you had no more episodes of bloody/dark stool. The GI doctors evaluated you and did not recommend getting an endoscopy at this time. Please follow up with your GI doctor 1-2 weeks after discharge. Please come back to the ED if you have any more episodes of black or bloody stools.     PRINCIPAL DISCHARGE DIAGNOSIS  Diagnosis: Weakness generalized  Assessment and Plan of Treatment: You came in to the hospital because you felt weak. You were founfd to have fevers. You given antibiotics. Blood tests were taken which did not show any signs of infection. An MRI was done which shows metastatic lesions in your liver from your cancer. The cancer doctors saw you while you were in the hospital and recommended discharge and follow up outpatient. Please follow up with your oncologist 1-2 weeks after discharge. Please come back to the emergency room if you have fevers, chest pain, SOB, worsening abdominal pain.  You were noted to have a fungal infection of your tongue which can be a side effect of having cancer or of your lantreotide. Please continue taking clotrimazole lozenge 5 times a day for 3 more days.        SECONDARY DISCHARGE DIAGNOSES  Diagnosis: GIB (gastrointestinal bleeding)  Assessment and Plan of Treatment: You had diarrhea and dark stools. Your blood count was low on admission. The GI doctors were called due to a concern of a possible bleed. Your blood counts were stable throughout the admission and you had no more episodes of bloody/dark stool. The GI doctors evaluated you and did not recommend getting an endoscopy at this time. Please follow up with your GI doctor 1-2 weeks after discharge. Please come back to the ED if you have any more episodes of black or bloody stools. Please start taking pantoprazole 40mg daily with meals to prevent over production of acid in your GI tract.

## 2022-06-15 NOTE — DISCHARGE NOTE PROVIDER - HOSPITAL COURSE
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 Patient also reports episode of melena, and associated malaise and decreased PO intake. States last melanotic episode was last Tuesday, however, before then was having multiple dark black stools (estimates about 20 in total). Patient also endorses chronic diarrhea  since last year, however states that last week she was having multiple episodes of diarrhea and they were not similar to prior, said that she had received her lanreotide injection prior to the start her of diarrhea. Patient states that diarrhea has since stopped as of Thursday. Reports last BM was yesterday, was normal in color and soft in consistency. She denies CP, SOB, subjective f/c, n/v.    In regards to her cancer, patient follows with Dr. Whitmore at [facility]. Last got lanreotide on 6/2 (next due 7/1). Also reports that she is pending peptide receptor radionuclide therapy (PRRT)  on 6/30. Patient previously had radiation therapy last year.      In the ED, labs significant for WBC 16, Hgb 8.9, platelet 608, Alk phos 20, ALT 49, AST 62.  Vital signs with Tmax: 100.9, HR:70 - 97, BP: 98/71 - 131/77. CTAP with evidence of dilated CBD and new 4mm right pulmonary nodule and progression of hepatic metastases. Given zosynx1, NS  bolus, and tylenolx1.     Patient was started on Zosyn. Blood cultures NGTD x 2, UCx NGTD.CXR clear lungs.  MRCP shows Enlarged liver with bilobar metastatic lesions with mass effect on intrahepatic biliary ducts. Mild diffuse dilatation of CBD measuring up to 8mm with no evidence of choledocholithiasis. Cholelithiasis without acute cholecystitis. GI was consulted and did not recommend any endoscopy at this time as hgb remained stable. Zosyn was discontinued on 6/15. Oncology evaluated the patient inside the hospital and stated _______. Patient should follow up with onoclogist Dr. Whitmore 1-2 weeks after discharge. Patient is hemodynamically stable and ready for discharge.   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 Patient also reports episode of melena, and associated malaise and decreased PO intake. States last melanotic episode was last Tuesday, however, before then was having multiple dark black stools (estimates about 20 in total). Patient also endorses chronic diarrhea  since last year, however states that last week she was having multiple episodes of diarrhea and they were not similar to prior, said that she had received her lanreotide injection prior to the start her of diarrhea. Patient states that diarrhea has since stopped as of Thursday. Reports last BM was yesterday, was normal in color and soft in consistency. She denies CP, SOB, subjective f/c, n/v.    In regards to her cancer, patient follows with Dr. Whitmore at [facility]. Last got lanreotide on 6/2 (next due 7/1). Also reports that she is pending peptide receptor radionuclide therapy (PRRT)  on 6/30. Patient previously had radiation therapy last year.      In the ED, labs significant for WBC 16, Hgb 8.9, platelet 608, Alk phos 20, ALT 49, AST 62.  Vital signs with Tmax: 100.9, HR:70 - 97, BP: 98/71 - 131/77. CTAP with evidence of dilated CBD and new 4mm right pulmonary nodule and progression of hepatic metastases. Given zosynx1, NS  bolus, and tylenolx1.     Patient was started on Zosyn. Blood cultures NGTD x 2, UCx NGTD.CXR clear lungs.  MRCP shows Enlarged liver with bilobar metastatic lesions with mass effect on intrahepatic biliary ducts. Mild diffuse dilatation of CBD measuring up to 8mm with no evidence of choledocholithiasis. Cholelithiasis without acute cholecystitis. GI was consulted and did not recommend any endoscopy at this time as hgb remained stable.  Zosyn was discontinued on 6/15. Oncology evaluated the patient inside the hospital and stated they recommend discharge and follow up outpatient. Patient should follow up with onoclogist Dr. Whitmore 1-2 weeks after discharge. Patient is hemodynamically stable and ready for discharge.   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 Patient also reports episode of melena, and associated malaise and decreased PO intake. States last melanotic episode was last Tuesday, however, before then was having multiple dark black stools (estimates about 20 in total). Patient also endorses chronic diarrhea  since last year, however states that last week she was having multiple episodes of diarrhea and they were not similar to prior, said that she had received her lanreotide injection prior to the start her of diarrhea. Patient states that diarrhea has since stopped as of Thursday. Reports last BM was yesterday, was normal in color and soft in consistency. She denies CP, SOB, subjective f/c, n/v.    In regards to her cancer, patient follows with Dr. Whitmore at [facility]. Last got lanreotide on 6/2 (next due 7/1). Also reports that she is pending peptide receptor radionuclide therapy (PRRT)  on 6/30. Patient previously had radiation therapy last year.      In the ED, labs significant for WBC 16, Hgb 8.9, platelet 608, Alk phos 20, ALT 49, AST 62.  Vital signs with Tmax: 100.9, HR:70 - 97, BP: 98/71 - 131/77. CTAP with evidence of dilated CBD and new 4mm right pulmonary nodule and progression of hepatic metastases. Given zosynx1, NS  bolus, and tylenolx1.     Patient was started on Zosyn. Blood cultures NGTD x 2, UCx NGTD.CXR clear lungs.  MRCP shows Enlarged liver with bilobar metastatic lesions with mass effect on intrahepatic biliary ducts. Mild diffuse dilatation of CBD measuring up to 8mm with no evidence of choledocholithiasis. Cholelithiasis without acute cholecystitis. GI was consulted and did not recommend any endoscopy at this time as hgb remained stable.  Zosyn was discontinued on 6/15. Oncology evaluated the patient inside the hospital and stated they recommend discharge and follow up outpatient. Patient should follow up with onoclogist Dr. Whitmore 1-2 weeks after discharge. Patient is hemodynamically stable and ready for discharge.

## 2022-06-15 NOTE — DISCHARGE NOTE PROVIDER - NSDCFUSCHEDAPPT_GEN_ALL_CORE_FT
[facility] Physician Christ  [facility]  [address]  Scheduled Appointment: 06/30/2022    Doctor, Unknown  [facility] PreAdmits  Scheduled Appointment: 06/30/2022    [facility] Physician Christ LOPEZ [facility]  Scheduled Appointment: 07/01/2022

## 2022-06-15 NOTE — PROGRESS NOTE ADULT - PROBLEM SELECTOR PLAN 4
Hgb to 8.9 on admission.  Baseline 10-11 last year. Possibly in s/o acute blood loss, lanreotide can also cause anemia.   - hgb stable, low iron sat with elevated ferritin. 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
Hgb to 8.9 on admission.  Baseline 10-11 last year. Possibly in s/o acute blood loss, lanreotide can also cause anemia.   - Trend CBC QD for now. STAT CBC with melanotic/BRBPR stools  - maintain active type and screen  - iron studies, ferritin

## 2022-06-15 NOTE — CHART NOTE - NSCHARTNOTEFT_GEN_A_CORE
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 CT w/ CBD 8mm, RUQ US CBD 3mm. MRCP negative for acute cholecystitis.     Assessment and Plan:  - gallbladder thickening and CBD dilation likely related to progressive metastatic disease; low suspicion for acute cholecystitis in setting of negative MRCP  - no acute surgical intervention at this time  - please re-consult with any further questions    B Team Surgery, [id number]

## 2022-06-15 NOTE — PROGRESS NOTE ADULT - SUBJECTIVE AND OBJECTIVE BOX
Chidi Pimentel MD  PGY 2 Department of Internal Medicine  Available on [employer] Teams      Patient is a 52y old  Female who presents with a chief complaint of weakness/melena (15 Woo 2022 05:58)      OVERNIGHT EVENTS: No acute overnight events.    SUBJECTIVE: Pt seen and examined. States she is feeling back to her normal self. No more bloody bowel movements. RUQ pain improved. Denies fevers, chills, CP, SOB, N/V, Constipation, Diarrhea    MEDICATIONS  (STANDING):  clotrimazole Lozenge 1 Lozenge Oral five times a day  pantoprazole    Tablet 40 milliGRAM(s) Oral before breakfast  polyethylene glycol 3350 17 Gram(s) Oral daily  senna 2 Tablet(s) Oral at bedtime    MEDICATIONS  (PRN):      I&O's Summary    2022 07:01  -  15 Woo 2022 07:00  --------------------------------------------------------  IN: 1650 mL / OUT: 0 mL / NET: 1650 mL        Vital Signs Last 24 Hrs  T(C): 36.8 (15 Woo 2022 10:23), Max: 38.1 (2022 21:10)  T(F): 98.3 (15 Woo 2022 10:23), Max: 100.6 (2022 21:10)  HR: 83 (15 Woo 2022 10:23) (72 - 83)  BP: 114/61 (15 Woo 2022 10:23) (107/69 - 119/64)  BP(mean): 71 (15 Woo 2022 04:58) (71 - 71)  RR: 16 (15 Woo 2022 10:23) (16 - 18)  SpO2: 100% (15 Woo 2022 10:23) (99% - 100%)    =================PHYSICAL EXAM=================    GENERAL: Laying comfortably, NAD  EYES: EOMI, PERRL, no scleral icterus  NECK: No JVD  LUNG: Clear to auscultation bilaterally; No wheeze, crackles or rhonci  HEART: Regular rate and rhythm; No murmurs, rubs, or gallops  ABDOMEN: RUQ TTP, Nontender, Nondistended  EXTREMITIES:  No LE edema, 2+ Peripheral Pulses, No clubbing, cyanosis, or edema  PSYCH: AAOx3  NEUROLOGY: non-focal, strength 5/5 in all extremities, sensation intact  SKIN: No rashes or lesions    =================================================    LABS:                        9.3    14.10 )-----------( 610      ( 15 Woo 2022 05:13 )             28.6     Auto Eosinophil # 0.16  / Auto Eosinophil % 1.1   / Auto Neutrophil # 10.88 / Auto Neutrophil % 77.3  / BANDS % x                            10.0   18.10 )-----------( 609      ( 2022 13:19 )             31.1     Auto Eosinophil # x     / Auto Eosinophil % x     / Auto Neutrophil # x     / Auto Neutrophil % x     / BANDS % x                            8.9    16.66 )-----------( 608      ( 2022 13:38 )             27.0     Auto Eosinophil # 0.00  / Auto Eosinophil % 0.0   / Auto Neutrophil # 13.79 / Auto Neutrophil % 82.8  / BANDS % x        06-15    137  |  100  |  9   ----------------------------<  118<H>  3.8   |  27  |  0.67  -14    137  |  101  |  7   ----------------------------<  92  4.0   |  26  |  0.55  06-13    134<L>  |  97<L>  |  10  ----------------------------<  111<H>  3.9   |  26  |  0.63    Ca    8.8      15 Woo 2022 05:13  Mg     2.00     06-15  Phos  3.3     -15  TPro  7.0  /  Alb  2.9<L>  /  TBili  0.7  /  DBili  x   /  AST  36<H>  /  ALT  35<H>  /  AlkPhos  705<H>  06-15  TPro  7.4  /  Alb  3.2<L>  /  TBili  0.9  /  DBili  0.4<H>  /  AST  42<H>  /  ALT  42<H>  /  AlkPhos  731<H>    TPro  7.6  /  Alb  3.4  /  TBili  1.0  /  DBili  x   /  AST  62<H>  /  ALT  49<H>  /  AlkPhos  720<H>      PT/INR - ( 15 Woo 2022 05:13 )   PT: 16.4 sec;   INR: 1.41 ratio         PTT - ( 15 Woo 2022 05:13 )  PTT:33.7 sec      Urinalysis Basic - ( 2022 15:00 )    Color: Yellow / Appearance: Slightly Turbid / S.015 / pH: x  Gluc: x / Ketone: Negative  / Bili: Negative / Urobili: 3 mg/dL   Blood: x / Protein: Trace / Nitrite: Negative   Leuk Esterase: Negative / RBC: 4 /HPF / WBC 1 /HPF   Sq Epi: x / Non Sq Epi: 1 /HPF / Bacteria: Few            RADIOLOGY & ADDITIONAL TESTS:    Imaging Personally Reviewed:    Consultant(s) Notes Reviewed:      Care Discussed with Consultants/Other Providers:   Chidi Pimentel MD  PGY 2 Department of Internal Medicine  Available on [employer] Teams    Patient is a 52y old  Female who presents with a chief complaint of weakness/melena (15 Woo 2022 05:58)    OVERNIGHT EVENTS: No acute overnight events.    SUBJECTIVE: Pt seen and examined. States she is feeling back to her normal self. No more bloody bowel movements. RUQ pain improved. Denies fevers, chills, CP, SOB, N/V, Constipation, Diarrhea    MEDICATIONS  (STANDING):  clotrimazole Lozenge 1 Lozenge Oral five times a day  pantoprazole    Tablet 40 milliGRAM(s) Oral before breakfast  polyethylene glycol 3350 17 Gram(s) Oral daily  senna 2 Tablet(s) Oral at bedtime    I&O's Summary    2022 07:01  -  15 Woo 2022 07:00  --------------------------------------------------------  IN: 1650 mL / OUT: 0 mL / NET: 1650 mL    Vital Signs Last 24 Hrs  T(C): 36.8 (15 Woo 2022 10:23), Max: 38.1 (2022 21:10)  T(F): 98.3 (15 Woo 2022 10:23), Max: 100.6 (2022 21:10)  HR: 83 (15 Woo 2022 10:23) (72 - 83)  BP: 114/61 (15 Woo 2022 10:23) (107/69 - 119/64)  BP(mean): 71 (15 Woo 2022 04:58) (71 - 71)  RR: 16 (15 Woo 2022 10:23) (16 - 18)  SpO2: 100% (15 Woo 2022 10:23) (99% - 100%)    =================PHYSICAL EXAM=================    GENERAL: Laying comfortably, NAD  EYES: EOMI, PERRL, no scleral icterus  NECK: No JVD  LUNG: Clear to auscultation bilaterally; No wheeze, crackles or rhonci  HEART: Regular rate and rhythm; No murmurs, rubs, or gallops  ABDOMEN: RUQ TTP, Nontender, Nondistended  EXTREMITIES:  No LE edema, 2+ Peripheral Pulses, No clubbing, cyanosis, or edema  PSYCH: AAOx3  NEUROLOGY: non-focal, strength 5/5 in all extremities, sensation intact  SKIN: No rashes or lesions    =================================================    LABS:                        9.3    14.10 )-----------( 610      ( 15 Woo 2022 05:13 )             28.6     Auto Eosinophil # 0.16  / Auto Eosinophil % 1.1   / Auto Neutrophil # 10.88 / Auto Neutrophil % 77.3  / BANDS % x                            10.0   18.10 )-----------( 609      ( 2022 13:19 )             31.1     Auto Eosinophil # x     / Auto Eosinophil % x     / Auto Neutrophil # x     / Auto Neutrophil % x     / BANDS % x                            8.9    16.66 )-----------( 608      ( 2022 13:38 )             27.0     Auto Eosinophil # 0.00  / Auto Eosinophil % 0.0   / Auto Neutrophil # 13.79 / Auto Neutrophil % 82.8  / BANDS % x        06-15    137  |  100  |  9   ----------------------------<  118<H>  3.8   |  27  |  0.67  -14    137  |  101  |  7   ----------------------------<  92  4.0   |  26  |  0.55  06-13    134<L>  |  97<L>  |  10  ----------------------------<  111<H>  3.9   |  26  |  0.63    Ca    8.8      15 Woo 2022 05:13  Mg     2.00     06-15  Phos  3.3     06-15  TPro  7.0  /  Alb  2.9<L>  /  TBili  0.7  /  DBili  x   /  AST  36<H>  /  ALT  35<H>  /  AlkPhos  705<H>  15  TPro  7.4  /  Alb  3.2<L>  /  TBili  0.9  /  DBili  0.4<H>  /  AST  42<H>  /  ALT  42<H>  /  AlkPhos  731<H>  06-14  TPro  7.6  /  Alb  3.4  /  TBili  1.0  /  DBili  x   /  AST  62<H>  /  ALT  49<H>  /  AlkPhos  720<H>  13    PT/INR - ( 15 Woo 2022 05:13 )   PT: 16.4 sec;   INR: 1.41 ratio    PTT - ( 15 Woo 2022 05:13 )  PTT:33.7 sec    Urinalysis Basic - ( 2022 15:00 )  Color: Yellow / Appearance: Slightly Turbid / S.015 / pH: x  Gluc: x / Ketone: Negative  / Bili: Negative / Urobili: 3 mg/dL   Blood: x / Protein: Trace / Nitrite: Negative   Leuk Esterase: Negative / RBC: 4 /HPF / WBC 1 /HPF   Sq Epi: x / Non Sq Epi: 1 /HPF / Bacteria: Few      MRCP:  FINDINGS:  LOWER CHEST: Within normal limits.    LIVER: Enlarged with multiple, bilobar metastatic lesions.  BILE DUCTS: CBD is enlarged measuring 8 mm. No filling defects.  GALLBLADDER: Cholelithiasis.  SPLEEN: Within normal limits.  PANCREAS: Within normal limits.  ADRENALS: Within normal limits.  KIDNEYS/URETERS: Within normal limits.    VISUALIZED PORTIONS:  BOWEL: Moderate amount of stool.  PERITONEUM: No ascites.  VESSELS: Within normal limits.  RETROPERITONEUM/LYMPH NODES: No lymphadenopathy.  ABDOMINAL WALL: Within normal limits.  BONES: Within normal limits.    IMPRESSION:  Enlarged liver with bilobar metastatic lesions with mass effect on   intrahepatic biliary ducts. Mild diffuse dilatation of CBD measuring up   to 8mm with no evidence of choledocholithiasis.  Cholelithiasis without acute cholecystitis.      Consultant(s) Notes Reviewed:  GI  Care Discussed with Consultants/Other Providers:

## 2022-06-15 NOTE — PROGRESS NOTE ADULT - ASSESSMENT
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 Now with concern for GIB, SIRS, and worsening metastatic disease.  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 Now with concern for GIB, SIRS, and worsening metastatic disease.

## 2022-06-18 LAB
CULTURE RESULTS: SIGNIFICANT CHANGE UP
CULTURE RESULTS: SIGNIFICANT CHANGE UP
SPECIMEN SOURCE: SIGNIFICANT CHANGE UP
SPECIMEN SOURCE: SIGNIFICANT CHANGE UP

## 2022-06-21 ENCOUNTER — RESULT REVIEW (OUTPATIENT)
Age: 52
End: 2022-06-21

## 2022-06-21 ENCOUNTER — LABORATORY RESULT (OUTPATIENT)
Age: 52
End: 2022-06-21

## 2022-06-21 ENCOUNTER — APPOINTMENT (OUTPATIENT)
Dept: HEMATOLOGY ONCOLOGY | Facility: CLINIC | Age: 52
End: 2022-06-21

## 2022-06-21 LAB
BASOPHILS # BLD AUTO: 0.08 K/UL — SIGNIFICANT CHANGE UP (ref 0–0.2)
BASOPHILS NFR BLD AUTO: 0.7 % — SIGNIFICANT CHANGE UP (ref 0–2)
EOSINOPHIL # BLD AUTO: 0.64 K/UL — HIGH (ref 0–0.5)
EOSINOPHIL NFR BLD AUTO: 5.9 % — SIGNIFICANT CHANGE UP (ref 0–6)
HCT VFR BLD CALC: 29.4 % — LOW (ref 34.5–45)
HGB BLD-MCNC: 9.5 G/DL — LOW (ref 11.5–15.5)
IMM GRANULOCYTES NFR BLD AUTO: 0.6 % — SIGNIFICANT CHANGE UP (ref 0–1.5)
LYMPHOCYTES # BLD AUTO: 19.4 % — SIGNIFICANT CHANGE UP (ref 13–44)
LYMPHOCYTES # BLD AUTO: 2.11 K/UL — SIGNIFICANT CHANGE UP (ref 1–3.3)
MCHC RBC-ENTMCNC: 20.6 PG — LOW (ref 27–34)
MCHC RBC-ENTMCNC: 32.3 G/DL — SIGNIFICANT CHANGE UP (ref 32–36)
MCV RBC AUTO: 63.6 FL — LOW (ref 80–100)
MONOCYTES # BLD AUTO: 0.9 K/UL — SIGNIFICANT CHANGE UP (ref 0–0.9)
MONOCYTES NFR BLD AUTO: 8.3 % — SIGNIFICANT CHANGE UP (ref 2–14)
NEUTROPHILS # BLD AUTO: 7.09 K/UL — SIGNIFICANT CHANGE UP (ref 1.8–7.4)
NEUTROPHILS NFR BLD AUTO: 65.1 % — SIGNIFICANT CHANGE UP (ref 43–77)
NRBC # BLD: 0 /100 WBCS — SIGNIFICANT CHANGE UP (ref 0–0)
PLATELET # BLD AUTO: 857 K/UL — HIGH (ref 150–400)
RBC # BLD: 4.62 M/UL — SIGNIFICANT CHANGE UP (ref 3.8–5.2)
RBC # FLD: 19.3 % — HIGH (ref 10.3–14.5)
WBC # BLD: 10.88 K/UL — HIGH (ref 3.8–10.5)
WBC # FLD AUTO: 10.88 K/UL — HIGH (ref 3.8–10.5)

## 2022-06-27 ENCOUNTER — NON-APPOINTMENT (OUTPATIENT)
Age: 52
End: 2022-06-27

## 2022-06-30 ENCOUNTER — OUTPATIENT (OUTPATIENT)
Dept: OUTPATIENT SERVICES | Facility: HOSPITAL | Age: 52
LOS: 1 days | End: 2022-06-30
Payer: COMMERCIAL

## 2022-06-30 ENCOUNTER — RESULT REVIEW (OUTPATIENT)
Age: 52
End: 2022-06-30

## 2022-06-30 ENCOUNTER — APPOINTMENT (OUTPATIENT)
Dept: NUCLEAR MEDICINE | Facility: HOSPITAL | Age: 52
End: 2022-06-30

## 2022-06-30 DIAGNOSIS — Z98.89 OTHER SPECIFIED POSTPROCEDURAL STATES: Chronic | ICD-10-CM

## 2022-06-30 DIAGNOSIS — Z90.49 ACQUIRED ABSENCE OF OTHER SPECIFIED PARTS OF DIGESTIVE TRACT: Chronic | ICD-10-CM

## 2022-06-30 PROCEDURE — A9513: CPT

## 2022-06-30 PROCEDURE — 80048 BASIC METABOLIC PNL TOTAL CA: CPT

## 2022-06-30 PROCEDURE — 84132 ASSAY OF SERUM POTASSIUM: CPT

## 2022-06-30 PROCEDURE — 79101 NUCLEAR RX IV ADMIN: CPT

## 2022-06-30 PROCEDURE — 36415 COLL VENOUS BLD VENIPUNCTURE: CPT

## 2022-06-30 PROCEDURE — 79101 NUCLEAR RX IV ADMIN: CPT | Mod: 26

## 2022-07-01 ENCOUNTER — APPOINTMENT (OUTPATIENT)
Dept: INFUSION THERAPY | Facility: HOSPITAL | Age: 52
End: 2022-07-01

## 2022-07-01 ENCOUNTER — APPOINTMENT (OUTPATIENT)
Dept: HEMATOLOGY ONCOLOGY | Facility: CLINIC | Age: 52
End: 2022-07-01

## 2022-07-01 VITALS
WEIGHT: 90.39 LBS | RESPIRATION RATE: 16 BRPM | HEIGHT: 62 IN | OXYGEN SATURATION: 99 % | SYSTOLIC BLOOD PRESSURE: 114 MMHG | TEMPERATURE: 97.7 F | BODY MASS INDEX: 16.63 KG/M2 | DIASTOLIC BLOOD PRESSURE: 76 MMHG | HEART RATE: 83 BPM

## 2022-07-01 PROCEDURE — 99214 OFFICE O/P EST MOD 30 MIN: CPT

## 2022-07-01 RX ORDER — CLOTRIMAZOLE 10 MG/1
10 LOZENGE ORAL
Qty: 15 | Refills: 0 | Status: DISCONTINUED | COMMUNITY
Start: 2022-06-15

## 2022-07-01 NOTE — HISTORY OF PRESENT ILLNESS
[Disease: _____________________] : Disease: [unfilled] [T: ___] : T[unfilled] [N: ___] : N[unfilled] [M: ___] : M[unfilled] [AJCC Stage: ____] : AJCC Stage: [unfilled] [de-identified] : Ms. Franklin was a new set of health still December of 2013 when she noticed some vague abdominal pain at which time she presented to the emergency room was found to have a mesenteric mass measuring 4.2 cm in greatest dimension by the aortic bifurcation. She eventually underwent a laparoscopic biopsy by Dr. Dennis Moya Singh is found to be consistent with a neuroendocrine tumor. Patient was eventually seen by a gastroenterologist Dr. Adi Patel who performed an upper endoscopy and colonoscopy that was unremarkable and was socially followed by a capsule study that revealed a mass in the jejunum patient was subsequently taken to the OR by Dr. 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  since there is no evidence of disease at that time and no evidence of metastatic disease after discussion with the patient and her presentation at tumor patient was placed on surveillance therapy off treatment.\par \par In November 2017 found to have metastatic disease and started on lanreotide\par \par July 2019 CT c/a/p showed liver metastases, unchanged to slightly increased in size compared with the prior study. 4 mm left lower lobe groundglass pulmonary nodule, unchanged dating back to the 2014 exam, most consistent with a benign etiology. \par \par In the meantime she was evaluated by Dr. Lilly at [place] for liver transplantation. The patient has been noncompliant with her lanreotide treatment. The last dose was in July, 2019, missed Meera dose. Amairani comes in for follow up while on Lanreotide and feels well overall.  She denies any abdominal pain, diarrhea. She continues to work without issues. Patient still has not had consultation with IR to discuss liver directed therapy (she requested). \par \par 1/17/2020 she feels fine, works without any issues. She denies any abdominal pain, diarrhea.  She was seen by Dr. Armenta for liver-directed therapy, has not decided when to receive the treatment. \par \par 4/24/2020 she feels fine, works without any issues. She denies any abdominal pain, diarrhea.  She was seen by Dr. Armenta for liver-directed therapy, has not decided when to receive the treatment.  [de-identified] : chromogranin and serotonin [de-identified] : 6/26/2020 : Ms Franklin  joined telehealth visit today. She received somatuline on 6/22/2020. No labs were obtained that day. She describes her "is moving slowly and her food seems to me slow slowly". She becker snot have NV and does not endorse pain. \par She feels well otherwise, working.\par \par 7/11/2020 CT c/a/p showed Liver metastases, overall increased in size and number since 7/13/2019. A 0.4 cm left lower lobe groundglass nodule is unchanged.. \par \par 9/4/2020 she reports feeling overall fine, eats well, sometimes epigastric discomfort, no pain, no weight loss. \par \par 12/30/2020 she had sore throat 10 days ago, had positive COVID test on 12/26 because of her coworker positive, otherwise feels overall fine, has good appetite, denies fever, chest pain, cough, and shortness of breath. \par \par 2/24/2021 she feels overall fine. eats well, denies nausea, vomiting, abdominal pain and diarrhea. \par \par 3/14/21 CT c/a/p showed Bilobar hepatic metastatic disease with interval progression compared with prior imaging. Lesions demonstrate enhancement on arterial phase imaging\par Hepatic dome 2.8 x 2.8 cm, previously 2.2 x 1.8 cm. Segment 7 lesion 4.2 x 4.1 cm, previously 1.8 x 1.8 cm. Segment 5 lesion 3.2 x 3.2 cm, previously 2.8 x 2.5 cm. Segment 6 lesion 2.4 x 2.4 cm, previously 1.9 x 1.8 cm. He started having RUQ mild pain referring to her right shoulder, has good appetite, denies abdominal pain and diarrhea.  \par \par 9/21/21 CT abdomen and pelvis showed Right hepatic dome (3:12) 4.0 x 3.1 cm, previously 3.7 x 3.1 cm. Simple in appearance. Segment 7 lesion (3:17) 2.7 x 2.6 cm, previously 3.5 x 3.2 cm. Similar in appearance. Segment 5 lesion likely extending into segment IVb (3:37) 3.5 x 3.4 cm, previously 3.2 x 3.2 cm. Appearance and enhancement is unchanged. \par Segment 6 lesion (3:35) 4.9 x 4.7 cm, previously 4.1 x 3.7 cm. Slightly increased central low density likely reflecting treatment change.\par \par 10/11/21 s/p Y90 SIRT with Dr. Armenta. SHe reports RUQ pain when coughing, facial flushing sometimes, gasping for breath. Her weakness has been better now since local treatment. Shortly after local treatment, 3 to 4 times of watery diarrhea. She has once formed stool now.  \par    \par 11/5/21: Pt returns with concerns for L. supraclavicular mass enlarging. Mass became very prominent and tender. It has since gotten smaller over last 2 weeks. Denies redness and fever. \par \par 12/4/21 CT c/a/p showed Bilobar hepatic neoplasm. Interval increase in size and number of left hepatic lobe lesions. Most of right hepatic lobe disease is not significantly changed in size with exception of segment 6 lesions which appear to have coalesced with concern for progression.\par \par 12/17/21 she reports worsening RUQ pain, sometimes radiating to the shoulder and back, loss of appetite and weight, 3 to 4 times of watery diarrhea. \par \par 3/12/22 CT c/a/p showed Interval progression of disease in the liver with increase in size and number of lesions. No evidence of metastatic disease in the chest.\par \par 3/29/22 Dotatate PET CT showed 1. Multiple intensely DOTATATE-avid bilobar hepatic metastases. 2. DOTATATE-avid left supraclavicular, mediastinal, and upper abdominal lymph node metastases. 3. Focus of increased radiotracer activity in the spleen is compatible with metastasis.\par \par 4/4/22 report RUQ intermittent pain, 5/10, sometimes with diarrhea 4 to 5 times, denies abdominal cramps and nausea and vomiting. States generalized fatigue and loss of energy. \par \par 7/1/22 recent ED visit for melena and fever, however, her melena was resolved in the ED without further work up and treated with zosyn one day with all fever work ups negative. She was discharged in the ED. 6/13 CT abdomen and pelvis showed Progression of hepatic metastatic disease when compared with previous study of 3/12/2022.\par Asymmetric gallbladder wall thickening/enhancement, including subtle increased prominence of the common bile duct may be infectious/inflammatory. New nonspecific 4 mm right pulmonary nodule, concerning for metastasis.\par reports loss of weight although eats well with intermittent diarrhea, sometimes loose stool, sometimes watery. Given the shortage or PRRT, she finally received treatment on June 30.  [FreeTextEntry1] : Lanreotide

## 2022-07-01 NOTE — ASSESSMENT
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 CT scans in July showed stable disease. I have reviewed all the scans with the patient. She has metastatic disease, was discussed with the importance of compliance with her treatment. She feels fine, denies abdominal cramps, diarrhea. CT scans showed slightly increasing in size for her liver lesions in July 2020. She has progression of disease for her liver metastasis in March, 2021 scans. Dec 4 2021 CT scans showed progression of disease. \par \par Plan\par continue lanreotide monthly injection (hold off 4 to 6 weeks prior to PRRT treatment)\par continue PRRT 177 Jyotsna dotatate.  She is referred to Dr. Haile at [facility], .  dotatate PET CT showed extensive disease. She also needs to stop lanreotide 4 to 6 weeks prior to treatment. AEs of this nucleotide include but are not limited to myelosuppression, secondary MDS and leukemia, renal, and hepatic toxicity. Alternatively everolimus 5 mg daily, AEs include but are not limited to fatigue, edema, hypertension, hyperglycemia, hypercholestolemia, nausea, vomiting, constipation vs diarrhea, skin rashes. She will think about it and decide which one she will choose\par s/p  IR liver directed therapy 8/2021\par check labs \par RTC in 4 weeks \par

## 2022-07-01 NOTE — REVIEW OF SYSTEMS
[Fatigue] : fatigue [Recent Change In Weight] : ~T recent weight change [Shortness Of Breath] : shortness of breath [Abdominal Pain] : abdominal pain [Diarrhea] : diarrhea [Negative] : Allergic/Immunologic [Chills] : no chills [Fever] : no fever [Chest Pain] : no chest pain [Lower Ext Edema] : no lower extremity edema [Wheezing] : no wheezing [Cough] : no cough [SOB on Exertion] : no shortness of breath during exertion [Constipation] : no constipation [FreeTextEntry7] : RUQ intermittent pain

## 2022-07-13 ENCOUNTER — NON-APPOINTMENT (OUTPATIENT)
Age: 52
End: 2022-07-13

## 2022-07-15 ENCOUNTER — NON-APPOINTMENT (OUTPATIENT)
Age: 52
End: 2022-07-15

## 2022-07-19 ENCOUNTER — OUTPATIENT (OUTPATIENT)
Dept: OUTPATIENT SERVICES | Facility: HOSPITAL | Age: 52
LOS: 1 days | Discharge: HOME | End: 2022-07-19

## 2022-07-19 DIAGNOSIS — Z90.49 ACQUIRED ABSENCE OF OTHER SPECIFIED PARTS OF DIGESTIVE TRACT: Chronic | ICD-10-CM

## 2022-07-19 DIAGNOSIS — D64.9 ANEMIA, UNSPECIFIED: ICD-10-CM

## 2022-07-19 DIAGNOSIS — Z98.89 OTHER SPECIFIED POSTPROCEDURAL STATES: Chronic | ICD-10-CM

## 2022-07-25 ENCOUNTER — NON-APPOINTMENT (OUTPATIENT)
Age: 52
End: 2022-07-25

## 2022-07-28 ENCOUNTER — RESULT REVIEW (OUTPATIENT)
Age: 52
End: 2022-07-28

## 2022-07-28 ENCOUNTER — APPOINTMENT (OUTPATIENT)
Dept: HEMATOLOGY ONCOLOGY | Facility: CLINIC | Age: 52
End: 2022-07-28

## 2022-07-28 LAB
BASOPHILS # BLD AUTO: 0.07 K/UL — SIGNIFICANT CHANGE UP (ref 0–0.2)
BASOPHILS NFR BLD AUTO: 0.9 % — SIGNIFICANT CHANGE UP (ref 0–2)
EOSINOPHIL # BLD AUTO: 0.27 K/UL — SIGNIFICANT CHANGE UP (ref 0–0.5)
EOSINOPHIL NFR BLD AUTO: 3.4 % — SIGNIFICANT CHANGE UP (ref 0–6)
HCT VFR BLD CALC: 29.6 % — LOW (ref 34.5–45)
HGB BLD-MCNC: 9.8 G/DL — LOW (ref 11.5–15.5)
IMM GRANULOCYTES NFR BLD AUTO: 0.1 % — SIGNIFICANT CHANGE UP (ref 0–1.5)
LYMPHOCYTES # BLD AUTO: 1.2 K/UL — SIGNIFICANT CHANGE UP (ref 1–3.3)
LYMPHOCYTES # BLD AUTO: 15.3 % — SIGNIFICANT CHANGE UP (ref 13–44)
MCHC RBC-ENTMCNC: 21.7 PG — LOW (ref 27–34)
MCHC RBC-ENTMCNC: 33.1 G/DL — SIGNIFICANT CHANGE UP (ref 32–36)
MCV RBC AUTO: 65.6 FL — LOW (ref 80–100)
MONOCYTES # BLD AUTO: 0.94 K/UL — HIGH (ref 0–0.9)
MONOCYTES NFR BLD AUTO: 12 % — SIGNIFICANT CHANGE UP (ref 2–14)
NEUTROPHILS # BLD AUTO: 5.36 K/UL — SIGNIFICANT CHANGE UP (ref 1.8–7.4)
NEUTROPHILS NFR BLD AUTO: 68.3 % — SIGNIFICANT CHANGE UP (ref 43–77)
NRBC # BLD: 0 /100 WBCS — SIGNIFICANT CHANGE UP (ref 0–0)
PLATELET # BLD AUTO: 312 K/UL — SIGNIFICANT CHANGE UP (ref 150–400)
RBC # BLD: 4.51 M/UL — SIGNIFICANT CHANGE UP (ref 3.8–5.2)
RBC # FLD: 20.5 % — HIGH (ref 10.3–14.5)
WBC # BLD: 7.85 K/UL — SIGNIFICANT CHANGE UP (ref 3.8–10.5)
WBC # FLD AUTO: 7.85 K/UL — SIGNIFICANT CHANGE UP (ref 3.8–10.5)

## 2022-07-29 ENCOUNTER — APPOINTMENT (OUTPATIENT)
Dept: HEMATOLOGY ONCOLOGY | Facility: CLINIC | Age: 52
End: 2022-07-29

## 2022-07-29 ENCOUNTER — APPOINTMENT (OUTPATIENT)
Dept: INFUSION THERAPY | Facility: HOSPITAL | Age: 52
End: 2022-07-29

## 2022-07-29 VITALS
DIASTOLIC BLOOD PRESSURE: 81 MMHG | WEIGHT: 92.59 LBS | SYSTOLIC BLOOD PRESSURE: 137 MMHG | OXYGEN SATURATION: 99 % | RESPIRATION RATE: 16 BRPM | HEIGHT: 62.01 IN | BODY MASS INDEX: 16.82 KG/M2 | TEMPERATURE: 98.2 F | HEART RATE: 66 BPM

## 2022-07-29 DIAGNOSIS — D3A.8 OTHER BENIGN NEUROENDOCRINE TUMORS: ICD-10-CM

## 2022-07-29 LAB
ALBUMIN SERPL ELPH-MCNC: 3.8 G/DL
ALP BLD-CCNC: 1035 U/L
ALT SERPL-CCNC: 40 U/L
ANION GAP SERPL CALC-SCNC: 14 MMOL/L
AST SERPL-CCNC: 61 U/L
BILIRUB SERPL-MCNC: 0.6 MG/DL
BUN SERPL-MCNC: 12 MG/DL
CALCIUM SERPL-MCNC: 9 MG/DL
CHLORIDE SERPL-SCNC: 102 MMOL/L
CO2 SERPL-SCNC: 24 MMOL/L
CREAT SERPL-MCNC: 0.58 MG/DL
EGFR: 108 ML/MIN/1.73M2
GLUCOSE SERPL-MCNC: 75 MG/DL
POTASSIUM SERPL-SCNC: 4.2 MMOL/L
PROT SERPL-MCNC: 7.1 G/DL
SODIUM SERPL-SCNC: 140 MMOL/L

## 2022-07-29 PROCEDURE — 99214 OFFICE O/P EST MOD 30 MIN: CPT

## 2022-08-01 LAB
CGA SERPL-MCNC: ABNORMAL NG/ML
GI PCR PANEL, STOOL: NORMAL

## 2022-08-03 LAB — SEROTONIN SERUM: 2231 NG/ML

## 2022-08-25 ENCOUNTER — RESULT REVIEW (OUTPATIENT)
Age: 52
End: 2022-08-25

## 2022-08-25 ENCOUNTER — OUTPATIENT (OUTPATIENT)
Dept: OUTPATIENT SERVICES | Facility: HOSPITAL | Age: 52
LOS: 1 days | End: 2022-08-25
Payer: COMMERCIAL

## 2022-08-25 ENCOUNTER — APPOINTMENT (OUTPATIENT)
Dept: NUCLEAR MEDICINE | Facility: HOSPITAL | Age: 52
End: 2022-08-25

## 2022-08-25 DIAGNOSIS — Z90.49 ACQUIRED ABSENCE OF OTHER SPECIFIED PARTS OF DIGESTIVE TRACT: Chronic | ICD-10-CM

## 2022-08-25 DIAGNOSIS — Z98.89 OTHER SPECIFIED POSTPROCEDURAL STATES: Chronic | ICD-10-CM

## 2022-08-25 PROCEDURE — 79101 NUCLEAR RX IV ADMIN: CPT | Mod: 26

## 2022-08-25 PROCEDURE — A9513: CPT

## 2022-08-25 PROCEDURE — 96365 THER/PROPH/DIAG IV INF INIT: CPT

## 2022-08-25 PROCEDURE — 36415 COLL VENOUS BLD VENIPUNCTURE: CPT

## 2022-08-25 PROCEDURE — 79101 NUCLEAR RX IV ADMIN: CPT

## 2022-08-25 PROCEDURE — 84132 ASSAY OF SERUM POTASSIUM: CPT

## 2022-08-25 PROCEDURE — 96366 THER/PROPH/DIAG IV INF ADDON: CPT

## 2022-08-25 NOTE — CHART NOTE - NSCHARTNOTEFT_GEN_A_CORE
Pt treated in Nuclear Medicine 8/25/22 at 8am with Lutathera infusion for Malignant Carcinoma of the Jejunum. Prior to infusion labs from 7/28/2022 reviewed. HGB 9.8, WBCs 7.85, PLTS 312, BILIRUBIN 0.6, and Creat 0.58 noted, allowing us to proceed with treatment. Amino Acids infused from 832am to 1247pm, Jyotsna-177 infused from 933am to 1042am. Treatment uneventful, pt denied nausea but felt as though episodes of diarrhea were going to start soon. Lanreotide injection appointment for 8/26/22 at Drs office confirmed. Pt next treatment scheduled for 10/20/22. Pt rpts after initial treatment having increased episodes of diarrhea, pt encouraged to increase water intake post procedure. Will call tomorrow to follow up with patient post procedure.

## 2022-08-26 ENCOUNTER — APPOINTMENT (OUTPATIENT)
Dept: INFUSION THERAPY | Facility: HOSPITAL | Age: 52
End: 2022-08-26

## 2022-09-20 ENCOUNTER — OUTPATIENT (OUTPATIENT)
Dept: OUTPATIENT SERVICES | Facility: HOSPITAL | Age: 52
LOS: 1 days | Discharge: HOME | End: 2022-09-20

## 2022-09-20 DIAGNOSIS — Z98.89 OTHER SPECIFIED POSTPROCEDURAL STATES: Chronic | ICD-10-CM

## 2022-09-20 DIAGNOSIS — Z90.49 ACQUIRED ABSENCE OF OTHER SPECIFIED PARTS OF DIGESTIVE TRACT: Chronic | ICD-10-CM

## 2022-09-23 ENCOUNTER — APPOINTMENT (OUTPATIENT)
Dept: HEMATOLOGY ONCOLOGY | Facility: CLINIC | Age: 52
End: 2022-09-23

## 2022-09-23 ENCOUNTER — APPOINTMENT (OUTPATIENT)
Dept: INFUSION THERAPY | Facility: HOSPITAL | Age: 52
End: 2022-09-23

## 2022-09-23 ENCOUNTER — RESULT REVIEW (OUTPATIENT)
Age: 52
End: 2022-09-23

## 2022-09-23 LAB
BASOPHILS # BLD AUTO: 0.04 K/UL — SIGNIFICANT CHANGE UP (ref 0–0.2)
BASOPHILS NFR BLD AUTO: 0.6 % — SIGNIFICANT CHANGE UP (ref 0–2)
EOSINOPHIL # BLD AUTO: 0.32 K/UL — SIGNIFICANT CHANGE UP (ref 0–0.5)
EOSINOPHIL NFR BLD AUTO: 4.9 % — SIGNIFICANT CHANGE UP (ref 0–6)
HCT VFR BLD CALC: 32.9 % — LOW (ref 34.5–45)
HGB BLD-MCNC: 11 G/DL — LOW (ref 11.5–15.5)
IMM GRANULOCYTES NFR BLD AUTO: 0.2 % — SIGNIFICANT CHANGE UP (ref 0–0.9)
LYMPHOCYTES # BLD AUTO: 1.09 K/UL — SIGNIFICANT CHANGE UP (ref 1–3.3)
LYMPHOCYTES # BLD AUTO: 16.8 % — SIGNIFICANT CHANGE UP (ref 13–44)
MCHC RBC-ENTMCNC: 22.4 PG — LOW (ref 27–34)
MCHC RBC-ENTMCNC: 33.4 G/DL — SIGNIFICANT CHANGE UP (ref 32–36)
MCV RBC AUTO: 66.9 FL — LOW (ref 80–100)
MONOCYTES # BLD AUTO: 0.62 K/UL — SIGNIFICANT CHANGE UP (ref 0–0.9)
MONOCYTES NFR BLD AUTO: 9.6 % — SIGNIFICANT CHANGE UP (ref 2–14)
NEUTROPHILS # BLD AUTO: 4.39 K/UL — SIGNIFICANT CHANGE UP (ref 1.8–7.4)
NEUTROPHILS NFR BLD AUTO: 67.9 % — SIGNIFICANT CHANGE UP (ref 43–77)
NRBC # BLD: 0 /100 WBCS — SIGNIFICANT CHANGE UP (ref 0–0)
PLATELET # BLD AUTO: 295 K/UL — SIGNIFICANT CHANGE UP (ref 150–400)
RBC # BLD: 4.92 M/UL — SIGNIFICANT CHANGE UP (ref 3.8–5.2)
RBC # FLD: 19.1 % — HIGH (ref 10.3–14.5)
WBC # BLD: 6.47 K/UL — SIGNIFICANT CHANGE UP (ref 3.8–10.5)
WBC # FLD AUTO: 6.47 K/UL — SIGNIFICANT CHANGE UP (ref 3.8–10.5)

## 2022-09-24 DIAGNOSIS — D50.9 IRON DEFICIENCY ANEMIA, UNSPECIFIED: ICD-10-CM

## 2022-09-24 DIAGNOSIS — D3A.8 OTHER BENIGN NEUROENDOCRINE TUMORS: ICD-10-CM

## 2022-09-26 LAB
ALBUMIN SERPL ELPH-MCNC: 4.7 G/DL
ALP BLD-CCNC: 867 U/L
ALT SERPL-CCNC: 61 U/L
ANION GAP SERPL CALC-SCNC: 15 MMOL/L
AST SERPL-CCNC: 89 U/L
BILIRUB SERPL-MCNC: 0.5 MG/DL
BUN SERPL-MCNC: 14 MG/DL
CALCIUM SERPL-MCNC: 9.6 MG/DL
CHLORIDE SERPL-SCNC: 100 MMOL/L
CO2 SERPL-SCNC: 24 MMOL/L
CREAT SERPL-MCNC: 0.5 MG/DL
EGFR: 112 ML/MIN/1.73M2
GLUCOSE SERPL-MCNC: 61 MG/DL
POTASSIUM SERPL-SCNC: 4.4 MMOL/L
PROT SERPL-MCNC: 8.2 G/DL
SODIUM SERPL-SCNC: 139 MMOL/L

## 2022-09-27 LAB — CGA SERPL-MCNC: ABNORMAL NG/ML

## 2022-10-03 LAB — SEROTONIN SERUM: 2143 NG/ML

## 2022-10-20 ENCOUNTER — RESULT REVIEW (OUTPATIENT)
Age: 52
End: 2022-10-20

## 2022-10-20 ENCOUNTER — OUTPATIENT (OUTPATIENT)
Dept: OUTPATIENT SERVICES | Facility: HOSPITAL | Age: 52
LOS: 1 days | End: 2022-10-20
Payer: COMMERCIAL

## 2022-10-20 ENCOUNTER — APPOINTMENT (OUTPATIENT)
Dept: NUCLEAR MEDICINE | Facility: HOSPITAL | Age: 52
End: 2022-10-20

## 2022-10-20 DIAGNOSIS — Z98.89 OTHER SPECIFIED POSTPROCEDURAL STATES: Chronic | ICD-10-CM

## 2022-10-20 DIAGNOSIS — Z90.49 ACQUIRED ABSENCE OF OTHER SPECIFIED PARTS OF DIGESTIVE TRACT: Chronic | ICD-10-CM

## 2022-10-20 PROCEDURE — 79101 NUCLEAR RX IV ADMIN: CPT | Mod: 26

## 2022-10-20 PROCEDURE — 84132 ASSAY OF SERUM POTASSIUM: CPT

## 2022-10-20 PROCEDURE — 79101 NUCLEAR RX IV ADMIN: CPT

## 2022-10-20 PROCEDURE — 36415 COLL VENOUS BLD VENIPUNCTURE: CPT

## 2022-10-20 PROCEDURE — A9513: CPT

## 2022-10-21 ENCOUNTER — APPOINTMENT (OUTPATIENT)
Dept: HEMATOLOGY ONCOLOGY | Facility: CLINIC | Age: 52
End: 2022-10-21

## 2022-10-21 ENCOUNTER — RESULT REVIEW (OUTPATIENT)
Age: 52
End: 2022-10-21

## 2022-10-21 ENCOUNTER — APPOINTMENT (OUTPATIENT)
Dept: INFUSION THERAPY | Facility: HOSPITAL | Age: 52
End: 2022-10-21

## 2022-10-21 VITALS
HEART RATE: 73 BPM | TEMPERATURE: 97.7 F | DIASTOLIC BLOOD PRESSURE: 81 MMHG | OXYGEN SATURATION: 99 % | BODY MASS INDEX: 17.09 KG/M2 | WEIGHT: 93.45 LBS | SYSTOLIC BLOOD PRESSURE: 120 MMHG | RESPIRATION RATE: 16 BRPM

## 2022-10-21 LAB
BASOPHILS # BLD AUTO: 0.04 K/UL — SIGNIFICANT CHANGE UP (ref 0–0.2)
BASOPHILS NFR BLD AUTO: 0.6 % — SIGNIFICANT CHANGE UP (ref 0–2)
EOSINOPHIL # BLD AUTO: 0.31 K/UL — SIGNIFICANT CHANGE UP (ref 0–0.5)
EOSINOPHIL NFR BLD AUTO: 4.9 % — SIGNIFICANT CHANGE UP (ref 0–6)
HCT VFR BLD CALC: 35.2 % — SIGNIFICANT CHANGE UP (ref 34.5–45)
HGB BLD-MCNC: 12 G/DL — SIGNIFICANT CHANGE UP (ref 11.5–15.5)
IMM GRANULOCYTES NFR BLD AUTO: 0.3 % — SIGNIFICANT CHANGE UP (ref 0–0.9)
LYMPHOCYTES # BLD AUTO: 0.8 K/UL — LOW (ref 1–3.3)
LYMPHOCYTES # BLD AUTO: 12.8 % — LOW (ref 13–44)
MCHC RBC-ENTMCNC: 23 PG — LOW (ref 27–34)
MCHC RBC-ENTMCNC: 34.1 G/DL — SIGNIFICANT CHANGE UP (ref 32–36)
MCV RBC AUTO: 67.6 FL — LOW (ref 80–100)
MONOCYTES # BLD AUTO: 0.49 K/UL — SIGNIFICANT CHANGE UP (ref 0–0.9)
MONOCYTES NFR BLD AUTO: 7.8 % — SIGNIFICANT CHANGE UP (ref 2–14)
NEUTROPHILS # BLD AUTO: 4.61 K/UL — SIGNIFICANT CHANGE UP (ref 1.8–7.4)
NEUTROPHILS NFR BLD AUTO: 73.6 % — SIGNIFICANT CHANGE UP (ref 43–77)
NRBC # BLD: 0 /100 WBCS — SIGNIFICANT CHANGE UP (ref 0–0)
PLATELET # BLD AUTO: 356 K/UL — SIGNIFICANT CHANGE UP (ref 150–400)
RBC # BLD: 5.21 M/UL — HIGH (ref 3.8–5.2)
RBC # FLD: 20 % — HIGH (ref 10.3–14.5)
WBC # BLD: 6.27 K/UL — SIGNIFICANT CHANGE UP (ref 3.8–10.5)
WBC # FLD AUTO: 6.27 K/UL — SIGNIFICANT CHANGE UP (ref 3.8–10.5)

## 2022-10-21 PROCEDURE — 99214 OFFICE O/P EST MOD 30 MIN: CPT

## 2022-10-24 LAB
ALBUMIN SERPL ELPH-MCNC: 4.6 G/DL
ALP BLD-CCNC: 835 U/L
ALT SERPL-CCNC: 112 U/L
ANION GAP SERPL CALC-SCNC: 11 MMOL/L
AST SERPL-CCNC: 106 U/L
BILIRUB SERPL-MCNC: 0.4 MG/DL
BUN SERPL-MCNC: 15 MG/DL
CALCIUM SERPL-MCNC: 9.8 MG/DL
CGA SERPL-MCNC: ABNORMAL NG/ML
CHLORIDE SERPL-SCNC: 104 MMOL/L
CO2 SERPL-SCNC: 26 MMOL/L
CREAT SERPL-MCNC: 0.67 MG/DL
EGFR: 104 ML/MIN/1.73M2
GLUCOSE SERPL-MCNC: 78 MG/DL
POTASSIUM SERPL-SCNC: 4.4 MMOL/L
PROT SERPL-MCNC: 8.1 G/DL
SODIUM SERPL-SCNC: 141 MMOL/L

## 2022-10-24 NOTE — REVIEW OF SYSTEMS
[Fatigue] : fatigue [Diarrhea] : diarrhea [Negative] : Allergic/Immunologic [Fever] : no fever [Chills] : no chills [Recent Change In Weight] : ~T no recent weight change [Chest Pain] : no chest pain [Lower Ext Edema] : no lower extremity edema [Wheezing] : no wheezing [Cough] : no cough [SOB on Exertion] : no shortness of breath during exertion [Abdominal Pain] : no abdominal pain [Constipation] : no constipation

## 2022-10-24 NOTE — ASSESSMENT
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 Her diagnosis has been reviewed with the patient and the importance of compliance with treatment has been stressed given that she has metastatic disease. CT scans in July 2020, March 2021 showed progression of her liver metastasis. She was referred to IR for Y90 treatment, however she declined until August 2021 when she ultimately received Y90. Thereafter scans continued to show progression of disease in her liver. She had PETCT dotatate scan in March 2022 which showed extensive disease and was found to be a candidate for PRRT 177 Jyotsna dotatate. She was referred to Dr. Haile at [facility]. AEs of this nucleotide include but are not limited to myelosuppression, secondary MDS and leukemia, renal, and hepatic toxicity. Alternative to PRRT is everolimus 5 mg daily. Her most recent scans in June 2022 show progression of her liver disease as well as lesions concerning for lung metastasis. Patient decided to proceed with PRRT 177 Jyotsna dotatate and received her first dose on June 30, 2022, second dose 8/25, third on 10/20. She is continued on monthly lanreotide injections.\par \par Plan\par Metastatic Small Bowel Neuroendocrine Tumor\par - continue monthly lanreotide injections; patient to receive today\par - continue with PRRT 177 Jyotsna dotatate at [id number] for a total of 4 doses, third dose given on 10/20\par - imaging from treatment yesterday showed "intense uptake in large portions of the liver and in a small area in the left upper quadrant that probably represents the jejunal primary, comparison with prior studies from June 30 and August 25, 2022 show that there has been no change in the distribution of activity or its intensity", however patient describes improvement in symptoms and displays clinical improvement \par -  final 177 Jyotsna dotatate injection in December, plan to repeat imaging 3 months after completion \par - follow up lab work from today, CBC, CMP, chromogranin A and serotonin\par \par Diarrhea\par - improved; continue loperamide as needed \par \par RTC 4 weeks

## 2022-10-24 NOTE — PHYSICAL EXAM
[Fully active, able to carry on all pre-disease performance without restriction] : Status 0 - Fully active, able to carry on all pre-disease performance without restriction [Normal] : affect appropriate [Thin] : thin [de-identified] : hyperactive bowel sounds, hepatomegaly - liver palpated one finger breadth below costal margin, masses appreciated, soft, non tender to palpation

## 2022-10-24 NOTE — HISTORY OF PRESENT ILLNESS
[Disease: _____________________] : Disease: [unfilled] [T: ___] : T[unfilled] [N: ___] : N[unfilled] [M: ___] : M[unfilled] [AJCC Stage: ____] : AJCC Stage: [unfilled] [N/A] : Currently not applicable [de-identified] : Ms. Franklin was a new set of health still December of 2013 when she noticed some vague abdominal pain at which time she presented to the emergency room was found to have a mesenteric mass measuring 4.2 cm in greatest dimension by the aortic bifurcation. She eventually underwent a laparoscopic biopsy by Dr. Dennis Singh is found to be consistent with a neuroendocrine tumor. Patient was eventually seen by a gastroenterologist Dr. Adi Patel who performed an upper endoscopy and colonoscopy that was unremarkable and was socially followed by a capsule study that revealed a mass in the jejunum patient was subsequently taken to the OR by Dr. 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  since there is no evidence of disease at that time and no evidence of metastatic disease after discussion with the patient and her presentation at tumor patient was placed on surveillance therapy off treatment.\par \par In November 2017 found to have metastatic disease and started on lanreotide\par \par July 2019 CT c/a/p showed liver metastases, unchanged to slightly increased in size compared with the prior study. 4 mm left lower lobe groundglass pulmonary nodule, unchanged dating back to the 2014 exam, most consistent with a benign etiology. \par \par In the meantime she was evaluated by Dr. Lilly at [facility] for liver transplantation. The patient has been noncompliant with her lanreotide treatment. The last dose was in July, 2019, missed Meera dose. Amairani comes in for follow up while on Lanreotide and feels well overall.  She denies any abdominal pain, diarrhea. She continues to work without issues. Patient still has not had consultation with IR to discuss liver directed therapy (she requested). \par \par 1/17/2020 she feels fine, works without any issues. She denies any abdominal pain, diarrhea.  She was seen by Dr. Armenta for liver-directed therapy, has not decided when to receive the treatment. \par \par 4/24/2020 she feels fine, works without any issues. She denies any abdominal pain, diarrhea.  She was seen by Dr. Armenta for liver-directed therapy, has not decided when to receive the treatment. \par \par 6/26/2020 : Ms Franklin  joined telehealth visit today. She received somatuline on 6/22/2020. No labs were obtained that day. She describes her "is moving slowly and her food seems to me slow slowly". She becker snot have NV and does not endorse pain. \par She feels well otherwise, working.\par \par 7/11/2020 CT c/a/p showed Liver metastases, overall increased in size and number since 7/13/2019. A 0.4 cm left lower lobe groundglass nodule is unchanged.. \par \par 9/4/2020 she reports feeling overall fine, eats well, sometimes epigastric discomfort, no pain, no weight loss. \par \par 12/30/2020 she had sore throat 10 days ago, had positive COVID test on 12/26 because of her coworker positive, otherwise feels overall fine, has good appetite, denies fever, chest pain, cough, and shortness of breath. \par \par 2/24/2021 she feels overall fine. eats well, denies nausea, vomiting, abdominal pain and diarrhea. \par \par 3/14/21 CT c/a/p showed Bilobar hepatic metastatic disease with interval progression compared with prior imaging. Lesions demonstrate enhancement on arterial phase imaging\par Hepatic dome 2.8 x 2.8 cm, previously 2.2 x 1.8 cm. Segment 7 lesion 4.2 x 4.1 cm, previously 1.8 x 1.8 cm. Segment 5 lesion 3.2 x 3.2 cm, previously 2.8 x 2.5 cm. Segment 6 lesion 2.4 x 2.4 cm, previously 1.9 x 1.8 cm. He started having RUQ mild pain referring to her right shoulder, has good appetite, denies abdominal pain and diarrhea.  \par \par 9/21/21 CT abdomen and pelvis showed Right hepatic dome (3:12) 4.0 x 3.1 cm, previously 3.7 x 3.1 cm. Simple in appearance. Segment 7 lesion (3:17) 2.7 x 2.6 cm, previously 3.5 x 3.2 cm. Similar in appearance. Segment 5 lesion likely extending into segment IVb (3:37) 3.5 x 3.4 cm, previously 3.2 x 3.2 cm. Appearance and enhancement is unchanged. \par Segment 6 lesion (3:35) 4.9 x 4.7 cm, previously 4.1 x 3.7 cm. Slightly increased central low density likely reflecting treatment change.\par \par 10/11/21 s/p Y90 SIRT with Dr. Armenta. SHe reports RUQ pain when coughing, facial flushing sometimes, gasping for breath. Her weakness has been better now since local treatment. Shortly after local treatment, 3 to 4 times of watery diarrhea. She has once formed stool now.  \par    \par 11/5/21: Pt returns with concerns for L. supraclavicular mass enlarging. Mass became very prominent and tender. It has since gotten smaller over last 2 weeks. Denies redness and fever. \par \par 12/4/21 CT c/a/p showed Bilobar hepatic neoplasm. Interval increase in size and number of left hepatic lobe lesions. Most of right hepatic lobe disease is not significantly changed in size with exception of segment 6 lesions which appear to have coalesced with concern for progression.\par \par 12/17/21 she reports worsening RUQ pain, sometimes radiating to the shoulder and back, loss of appetite and weight, 3 to 4 times of watery diarrhea. \par \par 3/12/22 CT c/a/p showed Interval progression of disease in the liver with increase in size and number of lesions. No evidence of metastatic disease in the chest.\par \par 3/29/22 Dotatate PET CT showed 1. Multiple intensely DOTATATE-avid bilobar hepatic metastases. 2. DOTATATE-avid left supraclavicular, mediastinal, and upper abdominal lymph node metastases. 3. Focus of increased radiotracer activity in the spleen is compatible with metastasis.\par \par 4/4/22 report RUQ intermittent pain, 5/10, sometimes with diarrhea 4 to 5 times, denies abdominal cramps and nausea and vomiting. States generalized fatigue and loss of energy. \par \par 7/1/22 recent ED visit for melena and fever, however, her melena was resolved in the ED without further work up and treated with zosyn one day with all fever work ups negative. She was discharged in the ED. 6/13 CT abdomen and pelvis showed Progression of hepatic metastatic disease when compared with previous study of 3/12/2022.\par Asymmetric gallbladder wall thickening/enhancement, including subtle increased prominence of the common bile duct may be infectious/inflammatory. New nonspecific 4 mm right pulmonary nodule, concerning for metastasis.\par reports loss of weight although eats well with intermittent diarrhea, sometimes loose stool, sometimes watery. Given the shortage or PRRT, she finally received treatment on June 30. \par \par 7/29/22: Patient has been struggling with worsening diarrhea for the past month or so. She feels like it is "raining inside of her stomach" and everything she eats runs right through her. She is not able to gain weight because of the diarrhea. She feels weak all of the time. Typically she has about 4-5 loose bowel movements a day, but on a bad day she can have about 10-12 and she will be up all night having bowel movements. This is extremely distressing to her. She has been taking imodium on and off, but not consistently. At most she was taking it 3 times a day. She reports having taken it this morning because she had a particularly bad night last night. She denies very foul smelling stool and blood in stool. In regards to her weakness, she actually feels a bit better after getting her first dose of PRRT. However, she still gets tired very quickly and is not able to do much more than go to work and get in to bed at about 6-7pm. She is hopeful that she will see improvement in her diarrhea with subsequent treatments. [de-identified] : chromogranin and serotonin [FreeTextEntry1] : Lanreotide, PRRT lutetium dotatate [de-identified] : \par 10/21/22: Patient received her third lutetium dotatate injection yesterday and is feeling tired and weak today. However, in the recent weeks patient has been feeling better and stronger. She has more energy. She recounts that she used to not go into the basement at work because it would be difficult for her to get back up, now she is able to tolerate climbing these stairs. She has a good appetite, but still is struggling to gain weight. Her diarrhea has also improved. She has 3-4 bowel movements a day, normal consistency, she has uses loperamide infrequently.

## 2022-10-28 LAB — SEROTONIN SERUM: 3174 NG/ML

## 2022-11-03 ENCOUNTER — NON-APPOINTMENT (OUTPATIENT)
Age: 52
End: 2022-11-03

## 2022-11-18 ENCOUNTER — RESULT REVIEW (OUTPATIENT)
Age: 52
End: 2022-11-18

## 2022-11-18 ENCOUNTER — LABORATORY RESULT (OUTPATIENT)
Age: 52
End: 2022-11-18

## 2022-11-18 ENCOUNTER — APPOINTMENT (OUTPATIENT)
Dept: INFUSION THERAPY | Facility: HOSPITAL | Age: 52
End: 2022-11-18

## 2022-11-18 LAB
BASOPHILS # BLD AUTO: 0.03 K/UL — SIGNIFICANT CHANGE UP (ref 0–0.2)
BASOPHILS NFR BLD AUTO: 0.6 % — SIGNIFICANT CHANGE UP (ref 0–2)
EOSINOPHIL # BLD AUTO: 0.26 K/UL — SIGNIFICANT CHANGE UP (ref 0–0.5)
EOSINOPHIL NFR BLD AUTO: 5 % — SIGNIFICANT CHANGE UP (ref 0–6)
HCT VFR BLD CALC: 33.8 % — LOW (ref 34.5–45)
HGB BLD-MCNC: 11.6 G/DL — SIGNIFICANT CHANGE UP (ref 11.5–15.5)
IMM GRANULOCYTES NFR BLD AUTO: 0.2 % — SIGNIFICANT CHANGE UP (ref 0–0.9)
LYMPHOCYTES # BLD AUTO: 1.12 K/UL — SIGNIFICANT CHANGE UP (ref 1–3.3)
LYMPHOCYTES # BLD AUTO: 21.7 % — SIGNIFICANT CHANGE UP (ref 13–44)
MCHC RBC-ENTMCNC: 23.7 PG — LOW (ref 27–34)
MCHC RBC-ENTMCNC: 34.3 G/DL — SIGNIFICANT CHANGE UP (ref 32–36)
MCV RBC AUTO: 69.1 FL — LOW (ref 80–100)
MONOCYTES # BLD AUTO: 0.6 K/UL — SIGNIFICANT CHANGE UP (ref 0–0.9)
MONOCYTES NFR BLD AUTO: 11.6 % — SIGNIFICANT CHANGE UP (ref 2–14)
NEUTROPHILS # BLD AUTO: 3.15 K/UL — SIGNIFICANT CHANGE UP (ref 1.8–7.4)
NEUTROPHILS NFR BLD AUTO: 60.9 % — SIGNIFICANT CHANGE UP (ref 43–77)
NRBC # BLD: 0 /100 WBCS — SIGNIFICANT CHANGE UP (ref 0–0)
PLATELET # BLD AUTO: 270 K/UL — SIGNIFICANT CHANGE UP (ref 150–400)
RBC # BLD: 4.89 M/UL — SIGNIFICANT CHANGE UP (ref 3.8–5.2)
RBC # FLD: 19.5 % — HIGH (ref 10.3–14.5)
WBC # BLD: 5.17 K/UL — SIGNIFICANT CHANGE UP (ref 3.8–10.5)
WBC # FLD AUTO: 5.17 K/UL — SIGNIFICANT CHANGE UP (ref 3.8–10.5)

## 2022-12-15 NOTE — CHART NOTE - NSCHARTNOTEFT_GEN_A_CORE
Pt treated in Nuclear Medicine 12/15/22 at 8am with Lutathera infusion for Malignant Carcinoma of the Jejunum. Prior to infusion labs from 11/18/2022 reviewed. HGB 11.6, WBCs 5.17, PLTS 270, BILIRUBIN 0.4, and Creat 0.52 noted, allowing us to proceed with treatment. Amino Acids infused from 817am to 1225pm, Jyotsna-177 infused from 931am to 1034am. Treatment uneventful, pt denied nausea and had no episodes of diarrhea. Lanreotide injection appointment for 12/16/22 at Drs office confirmed. Pt encouraged to increase water intake post procedure. Provided w/ discharge instructions, verbalizes understanding. Presently undergoing POST Treatment SPECT scan. Will call tomorrow to follow up with patient post procedure. Pt will return in three months for followup PET scan.

## 2023-01-01 ENCOUNTER — APPOINTMENT (OUTPATIENT)
Dept: INFUSION THERAPY | Facility: HOSPITAL | Age: 53
End: 2023-01-01

## 2023-01-01 ENCOUNTER — NON-APPOINTMENT (OUTPATIENT)
Age: 53
End: 2023-01-01

## 2023-01-01 ENCOUNTER — APPOINTMENT (OUTPATIENT)
Dept: HEMATOLOGY ONCOLOGY | Facility: CLINIC | Age: 53
End: 2023-01-01
Payer: COMMERCIAL

## 2023-01-01 ENCOUNTER — RESULT REVIEW (OUTPATIENT)
Age: 53
End: 2023-01-01

## 2023-01-01 ENCOUNTER — OUTPATIENT (OUTPATIENT)
Dept: OUTPATIENT SERVICES | Facility: HOSPITAL | Age: 53
LOS: 1 days | End: 2023-01-01
Payer: COMMERCIAL

## 2023-01-01 ENCOUNTER — APPOINTMENT (OUTPATIENT)
Dept: HEMATOLOGY ONCOLOGY | Facility: CLINIC | Age: 53
End: 2023-01-01

## 2023-01-01 ENCOUNTER — APPOINTMENT (OUTPATIENT)
Dept: NUCLEAR MEDICINE | Facility: IMAGING CENTER | Age: 53
End: 2023-01-01
Payer: COMMERCIAL

## 2023-01-01 ENCOUNTER — APPOINTMENT (OUTPATIENT)
Dept: ULTRASOUND IMAGING | Facility: IMAGING CENTER | Age: 53
End: 2023-01-01
Payer: COMMERCIAL

## 2023-01-01 ENCOUNTER — OUTPATIENT (OUTPATIENT)
Dept: OUTPATIENT SERVICES | Facility: HOSPITAL | Age: 53
LOS: 1 days | Discharge: HOME | End: 2023-01-01

## 2023-01-01 ENCOUNTER — TRANSCRIPTION ENCOUNTER (OUTPATIENT)
Age: 53
End: 2023-01-01

## 2023-01-01 ENCOUNTER — APPOINTMENT (OUTPATIENT)
Dept: CT IMAGING | Facility: IMAGING CENTER | Age: 53
End: 2023-01-01
Payer: COMMERCIAL

## 2023-01-01 ENCOUNTER — INPATIENT (INPATIENT)
Facility: HOSPITAL | Age: 53
LOS: 7 days | Discharge: HOME HEALTH | End: 2023-11-16
Attending: INTERNAL MEDICINE | Admitting: INTERNAL MEDICINE
Payer: COMMERCIAL

## 2023-01-01 ENCOUNTER — LABORATORY RESULT (OUTPATIENT)
Age: 53
End: 2023-01-01

## 2023-01-01 VITALS
RESPIRATION RATE: 16 BRPM | DIASTOLIC BLOOD PRESSURE: 65 MMHG | OXYGEN SATURATION: 99 % | WEIGHT: 99.43 LBS | HEIGHT: 62 IN | BODY MASS INDEX: 18.3 KG/M2 | HEART RATE: 67 BPM | TEMPERATURE: 96.3 F | SYSTOLIC BLOOD PRESSURE: 112 MMHG

## 2023-01-01 VITALS
RESPIRATION RATE: 16 BRPM | HEART RATE: 61 BPM | BODY MASS INDEX: 19.23 KG/M2 | DIASTOLIC BLOOD PRESSURE: 77 MMHG | WEIGHT: 105.82 LBS | TEMPERATURE: 97.1 F | SYSTOLIC BLOOD PRESSURE: 138 MMHG | HEIGHT: 62.01 IN | OXYGEN SATURATION: 100 %

## 2023-01-01 VITALS
WEIGHT: 102.07 LBS | OXYGEN SATURATION: 98 % | RESPIRATION RATE: 16 BRPM | HEART RATE: 73 BPM | SYSTOLIC BLOOD PRESSURE: 121 MMHG | BODY MASS INDEX: 18.66 KG/M2 | DIASTOLIC BLOOD PRESSURE: 71 MMHG

## 2023-01-01 VITALS
BODY MASS INDEX: 19.63 KG/M2 | HEIGHT: 62.01 IN | SYSTOLIC BLOOD PRESSURE: 119 MMHG | RESPIRATION RATE: 16 BRPM | DIASTOLIC BLOOD PRESSURE: 74 MMHG | TEMPERATURE: 98.4 F | WEIGHT: 108.03 LBS | OXYGEN SATURATION: 99 % | HEART RATE: 78 BPM

## 2023-01-01 VITALS
HEIGHT: 62.01 IN | OXYGEN SATURATION: 100 % | DIASTOLIC BLOOD PRESSURE: 79 MMHG | SYSTOLIC BLOOD PRESSURE: 127 MMHG | HEART RATE: 92 BPM | RESPIRATION RATE: 20 BRPM | TEMPERATURE: 99 F

## 2023-01-01 VITALS
DIASTOLIC BLOOD PRESSURE: 84 MMHG | TEMPERATURE: 97.3 F | WEIGHT: 103.62 LBS | BODY MASS INDEX: 18.95 KG/M2 | OXYGEN SATURATION: 100 % | SYSTOLIC BLOOD PRESSURE: 156 MMHG | HEART RATE: 63 BPM | RESPIRATION RATE: 16 BRPM

## 2023-01-01 VITALS
HEART RATE: 81 BPM | DIASTOLIC BLOOD PRESSURE: 62 MMHG | OXYGEN SATURATION: 100 % | TEMPERATURE: 98 F | RESPIRATION RATE: 18 BRPM | SYSTOLIC BLOOD PRESSURE: 106 MMHG

## 2023-01-01 VITALS
HEART RATE: 61 BPM | TEMPERATURE: 96.6 F | BODY MASS INDEX: 19.19 KG/M2 | SYSTOLIC BLOOD PRESSURE: 112 MMHG | RESPIRATION RATE: 16 BRPM | OXYGEN SATURATION: 98 % | WEIGHT: 104.94 LBS | DIASTOLIC BLOOD PRESSURE: 71 MMHG

## 2023-01-01 VITALS
SYSTOLIC BLOOD PRESSURE: 110 MMHG | TEMPERATURE: 97.3 F | WEIGHT: 104.06 LBS | OXYGEN SATURATION: 100 % | HEART RATE: 89 BPM | BODY MASS INDEX: 19.03 KG/M2 | DIASTOLIC BLOOD PRESSURE: 72 MMHG | RESPIRATION RATE: 16 BRPM

## 2023-01-01 DIAGNOSIS — Z00.8 ENCOUNTER FOR OTHER GENERAL EXAMINATION: ICD-10-CM

## 2023-01-01 DIAGNOSIS — Z71.89 OTHER SPECIFIED COUNSELING: ICD-10-CM

## 2023-01-01 DIAGNOSIS — Z90.49 ACQUIRED ABSENCE OF OTHER SPECIFIED PARTS OF DIGESTIVE TRACT: Chronic | ICD-10-CM

## 2023-01-01 DIAGNOSIS — Z29.9 ENCOUNTER FOR PROPHYLACTIC MEASURES, UNSPECIFIED: ICD-10-CM

## 2023-01-01 DIAGNOSIS — C7B.02 SECONDARY CARCINOID TUMORS OF LIVER: ICD-10-CM

## 2023-01-01 DIAGNOSIS — C78.7 SECONDARY MALIGNANT NEOPLASM OF LIVER AND INTRAHEPATIC BILE DUCT: ICD-10-CM

## 2023-01-01 DIAGNOSIS — Z98.89 OTHER SPECIFIED POSTPROCEDURAL STATES: Chronic | ICD-10-CM

## 2023-01-01 DIAGNOSIS — K52.9 NONINFECTIVE GASTROENTERITIS AND COLITIS, UNSPECIFIED: ICD-10-CM

## 2023-01-01 DIAGNOSIS — R60.0 LOCALIZED EDEMA: ICD-10-CM

## 2023-01-01 DIAGNOSIS — C17.9 MALIGNANT NEOPLASM OF SMALL INTESTINE, UNSPECIFIED: ICD-10-CM

## 2023-01-01 DIAGNOSIS — R18.8 OTHER ASCITES: ICD-10-CM

## 2023-01-01 DIAGNOSIS — Z51.89 ENCOUNTER FOR OTHER SPECIFIED AFTERCARE: ICD-10-CM

## 2023-01-01 DIAGNOSIS — R19.7 DIARRHEA, UNSPECIFIED: ICD-10-CM

## 2023-01-01 DIAGNOSIS — R62.7 ADULT FAILURE TO THRIVE: ICD-10-CM

## 2023-01-01 DIAGNOSIS — Z98.890 OTHER SPECIFIED POSTPROCEDURAL STATES: ICD-10-CM

## 2023-01-01 DIAGNOSIS — Z51.5 ENCOUNTER FOR PALLIATIVE CARE: ICD-10-CM

## 2023-01-01 DIAGNOSIS — C7A.8 OTHER MALIGNANT NEUROENDOCRINE TUMORS: ICD-10-CM

## 2023-01-01 DIAGNOSIS — D3A.8 OTHER BENIGN NEUROENDOCRINE TUMORS: ICD-10-CM

## 2023-01-01 DIAGNOSIS — G89.3 NEOPLASM RELATED PAIN (ACUTE) (CHRONIC): ICD-10-CM

## 2023-01-01 DIAGNOSIS — A04.8 OTHER SPECIFIED BACTERIAL INTESTINAL INFECTIONS: ICD-10-CM

## 2023-01-01 DIAGNOSIS — R10.9 UNSPECIFIED ABDOMINAL PAIN: ICD-10-CM

## 2023-01-01 DIAGNOSIS — K92.1 MELENA: ICD-10-CM

## 2023-01-01 DIAGNOSIS — E86.0 DEHYDRATION: ICD-10-CM

## 2023-01-01 DIAGNOSIS — R53.1 WEAKNESS: ICD-10-CM

## 2023-01-01 LAB
ALBUMIN FLD-MCNC: 0.4 G/DL — SIGNIFICANT CHANGE UP
ALBUMIN FLD-MCNC: 0.4 G/DL — SIGNIFICANT CHANGE UP
ALBUMIN SERPL ELPH-MCNC: 2.3 G/DL — LOW (ref 3.3–5)
ALBUMIN SERPL ELPH-MCNC: 2.3 G/DL — LOW (ref 3.3–5)
ALBUMIN SERPL ELPH-MCNC: 2.5 G/DL — LOW (ref 3.3–5)
ALBUMIN SERPL ELPH-MCNC: 2.5 G/DL — LOW (ref 3.3–5)
ALBUMIN SERPL ELPH-MCNC: 3.3 G/DL — SIGNIFICANT CHANGE UP (ref 3.3–5)
ALBUMIN SERPL ELPH-MCNC: 3.3 G/DL — SIGNIFICANT CHANGE UP (ref 3.3–5)
ALBUMIN SERPL ELPH-MCNC: 3.9 G/DL
ALBUMIN SERPL ELPH-MCNC: 3.9 G/DL
ALBUMIN SERPL ELPH-MCNC: 4.1 G/DL
ALBUMIN SERPL ELPH-MCNC: 4.2 G/DL — SIGNIFICANT CHANGE UP (ref 3.3–5)
ALBUMIN SERPL ELPH-MCNC: 4.4 G/DL
ALBUMIN SERPL ELPH-MCNC: 4.4 G/DL
ALBUMIN SERPL ELPH-MCNC: 4.5 G/DL
ALP BLD-CCNC: 1022 U/L
ALP BLD-CCNC: 1186 U/L
ALP BLD-CCNC: 1249 U/L
ALP BLD-CCNC: 277 U/L
ALP BLD-CCNC: 311 U/L
ALP BLD-CCNC: 394 U/L
ALP BLD-CCNC: 429 U/L
ALP BLD-CCNC: 965 U/L
ALP SERPL-CCNC: 1026 U/L — HIGH (ref 40–120)
ALP SERPL-CCNC: 1243 U/L — HIGH (ref 40–120)
ALP SERPL-CCNC: 1243 U/L — HIGH (ref 40–120)
ALP SERPL-CCNC: 1423 U/L — HIGH (ref 40–120)
ALP SERPL-CCNC: 1423 U/L — HIGH (ref 40–120)
ALP SERPL-CCNC: 1504 U/L — HIGH (ref 40–120)
ALP SERPL-CCNC: 1504 U/L — HIGH (ref 40–120)
ALT FLD-CCNC: 31 U/L — SIGNIFICANT CHANGE UP (ref 4–33)
ALT FLD-CCNC: 31 U/L — SIGNIFICANT CHANGE UP (ref 4–33)
ALT FLD-CCNC: 39 U/L — HIGH (ref 4–33)
ALT FLD-CCNC: 39 U/L — HIGH (ref 4–33)
ALT FLD-CCNC: 39 U/L — SIGNIFICANT CHANGE UP (ref 10–45)
ALT FLD-CCNC: 47 U/L — HIGH (ref 4–33)
ALT FLD-CCNC: 47 U/L — HIGH (ref 4–33)
ALT SERPL-CCNC: 30 U/L
ALT SERPL-CCNC: 33 U/L
ALT SERPL-CCNC: 33 U/L
ALT SERPL-CCNC: 50 U/L
ALT SERPL-CCNC: 50 U/L
ALT SERPL-CCNC: 54 U/L
ALT SERPL-CCNC: 54 U/L
ALT SERPL-CCNC: 73 U/L
ANION GAP SERPL CALC-SCNC: 10 MMOL/L
ANION GAP SERPL CALC-SCNC: 11 MMOL/L — SIGNIFICANT CHANGE UP (ref 5–17)
ANION GAP SERPL CALC-SCNC: 11 MMOL/L — SIGNIFICANT CHANGE UP (ref 7–14)
ANION GAP SERPL CALC-SCNC: 12 MMOL/L
ANION GAP SERPL CALC-SCNC: 12 MMOL/L
ANION GAP SERPL CALC-SCNC: 13 MMOL/L
ANION GAP SERPL CALC-SCNC: 13 MMOL/L
ANION GAP SERPL CALC-SCNC: 14 MMOL/L
ANION GAP SERPL CALC-SCNC: 14 MMOL/L
ANION GAP SERPL CALC-SCNC: 15 MMOL/L
ANION GAP SERPL CALC-SCNC: 8 MMOL/L — SIGNIFICANT CHANGE UP (ref 7–14)
ANION GAP SERPL CALC-SCNC: 8 MMOL/L — SIGNIFICANT CHANGE UP (ref 7–14)
ANION GAP SERPL CALC-SCNC: 9 MMOL/L — SIGNIFICANT CHANGE UP (ref 7–14)
ANISOCYTOSIS BLD QL: SIGNIFICANT CHANGE UP
ANISOCYTOSIS BLD QL: SIGNIFICANT CHANGE UP
APPEARANCE UR: CLEAR — SIGNIFICANT CHANGE UP
APPEARANCE UR: CLEAR — SIGNIFICANT CHANGE UP
APTT BLD: 35.4 SEC
APTT BLD: 35.9 SEC — HIGH (ref 24.5–35.6)
APTT BLD: 35.9 SEC — HIGH (ref 24.5–35.6)
AST SERPL-CCNC: 100 U/L — HIGH (ref 10–40)
AST SERPL-CCNC: 48 U/L — HIGH (ref 4–32)
AST SERPL-CCNC: 48 U/L — HIGH (ref 4–32)
AST SERPL-CCNC: 50 U/L
AST SERPL-CCNC: 52 U/L
AST SERPL-CCNC: 55 U/L
AST SERPL-CCNC: 64 U/L
AST SERPL-CCNC: 65 U/L
AST SERPL-CCNC: 65 U/L
AST SERPL-CCNC: 66 U/L — HIGH (ref 4–32)
AST SERPL-CCNC: 66 U/L — HIGH (ref 4–32)
AST SERPL-CCNC: 68 U/L — HIGH (ref 4–32)
AST SERPL-CCNC: 68 U/L — HIGH (ref 4–32)
AST SERPL-CCNC: 78 U/L
AST SERPL-CCNC: 81 U/L
B PERT IGG+IGM PNL SER: ABNORMAL
B PERT IGG+IGM PNL SER: ABNORMAL
BACTERIA # UR AUTO: NEGATIVE /HPF — SIGNIFICANT CHANGE UP
BACTERIA # UR AUTO: NEGATIVE /HPF — SIGNIFICANT CHANGE UP
BASE EXCESS BLDV CALC-SCNC: 8.3 MMOL/L — HIGH (ref -2–3)
BASE EXCESS BLDV CALC-SCNC: 8.3 MMOL/L — HIGH (ref -2–3)
BASOPHILS # BLD AUTO: 0 K/UL — SIGNIFICANT CHANGE UP (ref 0–0.2)
BASOPHILS # BLD AUTO: 0 K/UL — SIGNIFICANT CHANGE UP (ref 0–0.2)
BASOPHILS # BLD AUTO: 0.02 K/UL — SIGNIFICANT CHANGE UP (ref 0–0.2)
BASOPHILS # BLD AUTO: 0.03 K/UL — SIGNIFICANT CHANGE UP (ref 0–0.2)
BASOPHILS # BLD AUTO: 0.04 K/UL — SIGNIFICANT CHANGE UP (ref 0–0.2)
BASOPHILS # BLD AUTO: 0.05 K/UL — SIGNIFICANT CHANGE UP (ref 0–0.2)
BASOPHILS NFR BLD AUTO: 0 % — SIGNIFICANT CHANGE UP (ref 0–2)
BASOPHILS NFR BLD AUTO: 0 % — SIGNIFICANT CHANGE UP (ref 0–2)
BASOPHILS NFR BLD AUTO: 0.1 % — SIGNIFICANT CHANGE UP (ref 0–2)
BASOPHILS NFR BLD AUTO: 0.1 % — SIGNIFICANT CHANGE UP (ref 0–2)
BASOPHILS NFR BLD AUTO: 0.2 % — SIGNIFICANT CHANGE UP (ref 0–2)
BASOPHILS NFR BLD AUTO: 0.2 % — SIGNIFICANT CHANGE UP (ref 0–2)
BASOPHILS NFR BLD AUTO: 0.4 % — SIGNIFICANT CHANGE UP (ref 0–2)
BASOPHILS NFR BLD AUTO: 0.4 % — SIGNIFICANT CHANGE UP (ref 0–2)
BASOPHILS NFR BLD AUTO: 0.5 % — SIGNIFICANT CHANGE UP (ref 0–2)
BASOPHILS NFR BLD AUTO: 0.7 % — SIGNIFICANT CHANGE UP (ref 0–2)
BASOPHILS NFR BLD AUTO: 0.8 % — SIGNIFICANT CHANGE UP (ref 0–2)
BILIRUB SERPL-MCNC: 0.2 MG/DL
BILIRUB SERPL-MCNC: 0.4 MG/DL
BILIRUB SERPL-MCNC: 0.6 MG/DL
BILIRUB SERPL-MCNC: 0.6 MG/DL — SIGNIFICANT CHANGE UP (ref 0.2–1.2)
BILIRUB SERPL-MCNC: 1 MG/DL
BILIRUB SERPL-MCNC: 1 MG/DL
BILIRUB SERPL-MCNC: 1.3 MG/DL — HIGH (ref 0.2–1.2)
BILIRUB SERPL-MCNC: 1.3 MG/DL — HIGH (ref 0.2–1.2)
BILIRUB SERPL-MCNC: 1.4 MG/DL — HIGH (ref 0.2–1.2)
BILIRUB SERPL-MCNC: 1.4 MG/DL — HIGH (ref 0.2–1.2)
BILIRUB SERPL-MCNC: 1.7 MG/DL — HIGH (ref 0.2–1.2)
BILIRUB SERPL-MCNC: 1.7 MG/DL — HIGH (ref 0.2–1.2)
BILIRUB UR-MCNC: ABNORMAL
BILIRUB UR-MCNC: ABNORMAL
BLD GP AB SCN SERPL QL: NEGATIVE — SIGNIFICANT CHANGE UP
BLD GP AB SCN SERPL QL: NEGATIVE — SIGNIFICANT CHANGE UP
BLOOD GAS VENOUS COMPREHENSIVE RESULT: SIGNIFICANT CHANGE UP
BLOOD GAS VENOUS COMPREHENSIVE RESULT: SIGNIFICANT CHANGE UP
BUN SERPL-MCNC: 10 MG/DL — SIGNIFICANT CHANGE UP (ref 7–23)
BUN SERPL-MCNC: 11 MG/DL — SIGNIFICANT CHANGE UP (ref 7–23)
BUN SERPL-MCNC: 12 MG/DL
BUN SERPL-MCNC: 12 MG/DL — SIGNIFICANT CHANGE UP (ref 7–23)
BUN SERPL-MCNC: 12 MG/DL — SIGNIFICANT CHANGE UP (ref 7–23)
BUN SERPL-MCNC: 14 MG/DL — SIGNIFICANT CHANGE UP (ref 7–23)
BUN SERPL-MCNC: 14 MG/DL — SIGNIFICANT CHANGE UP (ref 7–23)
BUN SERPL-MCNC: 15 MG/DL
BUN SERPL-MCNC: 16 MG/DL
BUN SERPL-MCNC: 16 MG/DL
BUN SERPL-MCNC: 7 MG/DL
BUN SERPL-MCNC: 7 MG/DL
BUN SERPL-MCNC: 8 MG/DL
BUN SERPL-MCNC: 8 MG/DL — SIGNIFICANT CHANGE UP (ref 7–23)
BUN SERPL-MCNC: 9 MG/DL
CALCIUM SERPL-MCNC: 7.9 MG/DL — LOW (ref 8.4–10.5)
CALCIUM SERPL-MCNC: 7.9 MG/DL — LOW (ref 8.4–10.5)
CALCIUM SERPL-MCNC: 8.1 MG/DL — LOW (ref 8.4–10.5)
CALCIUM SERPL-MCNC: 8.1 MG/DL — LOW (ref 8.4–10.5)
CALCIUM SERPL-MCNC: 8.2 MG/DL — LOW (ref 8.4–10.5)
CALCIUM SERPL-MCNC: 8.2 MG/DL — LOW (ref 8.4–10.5)
CALCIUM SERPL-MCNC: 8.3 MG/DL — LOW (ref 8.4–10.5)
CALCIUM SERPL-MCNC: 8.3 MG/DL — LOW (ref 8.4–10.5)
CALCIUM SERPL-MCNC: 8.4 MG/DL — SIGNIFICANT CHANGE UP (ref 8.4–10.5)
CALCIUM SERPL-MCNC: 8.4 MG/DL — SIGNIFICANT CHANGE UP (ref 8.4–10.5)
CALCIUM SERPL-MCNC: 8.5 MG/DL — SIGNIFICANT CHANGE UP (ref 8.4–10.5)
CALCIUM SERPL-MCNC: 8.9 MG/DL
CALCIUM SERPL-MCNC: 9 MG/DL
CALCIUM SERPL-MCNC: 9 MG/DL — SIGNIFICANT CHANGE UP (ref 8.4–10.5)
CALCIUM SERPL-MCNC: 9 MG/DL — SIGNIFICANT CHANGE UP (ref 8.4–10.5)
CALCIUM SERPL-MCNC: 9.2 MG/DL
CALCIUM SERPL-MCNC: 9.3 MG/DL — SIGNIFICANT CHANGE UP (ref 8.4–10.5)
CALCIUM SERPL-MCNC: 9.4 MG/DL
CALCIUM SERPL-MCNC: 9.6 MG/DL
CALCIUM SERPL-MCNC: 9.8 MG/DL
CALCIUM SERPL-MCNC: 9.8 MG/DL
CALCIUM SERPL-MCNC: 9.9 MG/DL
CAST: 0 /LPF — SIGNIFICANT CHANGE UP (ref 0–4)
CAST: 0 /LPF — SIGNIFICANT CHANGE UP (ref 0–4)
CGA SERPL-MCNC: 9136 NG/ML
CGA SERPL-MCNC: ABNORMAL NG/ML
CHLORIDE BLDV-SCNC: 97 MMOL/L — SIGNIFICANT CHANGE UP (ref 96–108)
CHLORIDE BLDV-SCNC: 97 MMOL/L — SIGNIFICANT CHANGE UP (ref 96–108)
CHLORIDE SERPL-SCNC: 100 MMOL/L
CHLORIDE SERPL-SCNC: 101 MMOL/L
CHLORIDE SERPL-SCNC: 102 MMOL/L
CHLORIDE SERPL-SCNC: 102 MMOL/L
CHLORIDE SERPL-SCNC: 102 MMOL/L — SIGNIFICANT CHANGE UP (ref 96–108)
CHLORIDE SERPL-SCNC: 103 MMOL/L
CHLORIDE SERPL-SCNC: 104 MMOL/L
CHLORIDE SERPL-SCNC: 92 MMOL/L — LOW (ref 98–107)
CHLORIDE SERPL-SCNC: 92 MMOL/L — LOW (ref 98–107)
CHLORIDE SERPL-SCNC: 93 MMOL/L — LOW (ref 98–107)
CHLORIDE SERPL-SCNC: 93 MMOL/L — LOW (ref 98–107)
CHLORIDE SERPL-SCNC: 95 MMOL/L — LOW (ref 98–107)
CHLORIDE SERPL-SCNC: 95 MMOL/L — LOW (ref 98–107)
CHLORIDE SERPL-SCNC: 96 MMOL/L — LOW (ref 98–107)
CHLORIDE SERPL-SCNC: 98 MMOL/L — SIGNIFICANT CHANGE UP (ref 98–107)
CHLORIDE SERPL-SCNC: 98 MMOL/L — SIGNIFICANT CHANGE UP (ref 98–107)
CHLORIDE SERPL-SCNC: 99 MMOL/L — SIGNIFICANT CHANGE UP (ref 98–107)
CHOLEST SERPL-MCNC: 252 MG/DL
CK MB BLD-MCNC: SIGNIFICANT CHANGE UP % (ref 0–2.5)
CK MB BLD-MCNC: SIGNIFICANT CHANGE UP % (ref 0–2.5)
CK MB CFR SERPL CALC: <1 NG/ML — SIGNIFICANT CHANGE UP
CK MB CFR SERPL CALC: <1 NG/ML — SIGNIFICANT CHANGE UP
CK SERPL-CCNC: 100 U/L — SIGNIFICANT CHANGE UP (ref 25–170)
CK SERPL-CCNC: 100 U/L — SIGNIFICANT CHANGE UP (ref 25–170)
CO2 BLDV-SCNC: 36.4 MMOL/L — HIGH (ref 22–26)
CO2 BLDV-SCNC: 36.4 MMOL/L — HIGH (ref 22–26)
CO2 SERPL-SCNC: 21 MMOL/L — LOW (ref 22–31)
CO2 SERPL-SCNC: 24 MMOL/L
CO2 SERPL-SCNC: 24 MMOL/L
CO2 SERPL-SCNC: 25 MMOL/L
CO2 SERPL-SCNC: 26 MMOL/L
CO2 SERPL-SCNC: 27 MMOL/L
CO2 SERPL-SCNC: 27 MMOL/L — SIGNIFICANT CHANGE UP (ref 22–31)
CO2 SERPL-SCNC: 27 MMOL/L — SIGNIFICANT CHANGE UP (ref 22–31)
CO2 SERPL-SCNC: 28 MMOL/L
CO2 SERPL-SCNC: 28 MMOL/L — SIGNIFICANT CHANGE UP (ref 22–31)
CO2 SERPL-SCNC: 29 MMOL/L — SIGNIFICANT CHANGE UP (ref 22–31)
CO2 SERPL-SCNC: 30 MMOL/L — SIGNIFICANT CHANGE UP (ref 22–31)
CO2 SERPL-SCNC: 30 MMOL/L — SIGNIFICANT CHANGE UP (ref 22–31)
CO2 SERPL-SCNC: 31 MMOL/L — SIGNIFICANT CHANGE UP (ref 22–31)
CO2 SERPL-SCNC: 31 MMOL/L — SIGNIFICANT CHANGE UP (ref 22–31)
COLOR FLD: ABNORMAL
COLOR FLD: ABNORMAL
COLOR SPEC: SIGNIFICANT CHANGE UP
COLOR SPEC: SIGNIFICANT CHANGE UP
COMMENT - FLUIDS: SIGNIFICANT CHANGE UP
COMMENT - FLUIDS: SIGNIFICANT CHANGE UP
CREAT SERPL-MCNC: 0.36 MG/DL — LOW (ref 0.5–1.3)
CREAT SERPL-MCNC: 0.36 MG/DL — LOW (ref 0.5–1.3)
CREAT SERPL-MCNC: 0.39 MG/DL — LOW (ref 0.5–1.3)
CREAT SERPL-MCNC: 0.39 MG/DL — LOW (ref 0.5–1.3)
CREAT SERPL-MCNC: 0.4 MG/DL — LOW (ref 0.5–1.3)
CREAT SERPL-MCNC: 0.42 MG/DL — LOW (ref 0.5–1.3)
CREAT SERPL-MCNC: 0.42 MG/DL — LOW (ref 0.5–1.3)
CREAT SERPL-MCNC: 0.45 MG/DL — LOW (ref 0.5–1.3)
CREAT SERPL-MCNC: 0.45 MG/DL — LOW (ref 0.5–1.3)
CREAT SERPL-MCNC: 0.46 MG/DL — LOW (ref 0.5–1.3)
CREAT SERPL-MCNC: 0.46 MG/DL — LOW (ref 0.5–1.3)
CREAT SERPL-MCNC: 0.49 MG/DL — LOW (ref 0.5–1.3)
CREAT SERPL-MCNC: 0.49 MG/DL — LOW (ref 0.5–1.3)
CREAT SERPL-MCNC: 0.51 MG/DL — SIGNIFICANT CHANGE UP (ref 0.5–1.3)
CREAT SERPL-MCNC: 0.52 MG/DL
CREAT SERPL-MCNC: 0.54 MG/DL
CREAT SERPL-MCNC: 0.55 MG/DL
CREAT SERPL-MCNC: 0.61 MG/DL
CREAT SERPL-MCNC: 0.61 MG/DL
CREAT SERPL-MCNC: 0.62 MG/DL
CREAT SERPL-MCNC: 0.69 MG/DL
CREAT SERPL-MCNC: 0.72 MG/DL
CULTURE RESULTS: ABNORMAL
CULTURE RESULTS: ABNORMAL
CULTURE RESULTS: SIGNIFICANT CHANGE UP
DIFF PNL FLD: NEGATIVE — SIGNIFICANT CHANGE UP
DIFF PNL FLD: NEGATIVE — SIGNIFICANT CHANGE UP
EGFR: 103 ML/MIN/1.73M2
EGFR: 106 ML/MIN/1.73M2
EGFR: 106 ML/MIN/1.73M2
EGFR: 107 ML/MIN/1.73M2
EGFR: 109 ML/MIN/1.73M2
EGFR: 109 ML/MIN/1.73M2
EGFR: 110 ML/MIN/1.73M2
EGFR: 111 ML/MIN/1.73M2 — SIGNIFICANT CHANGE UP
EGFR: 112 ML/MIN/1.73M2 — SIGNIFICANT CHANGE UP
EGFR: 112 ML/MIN/1.73M2 — SIGNIFICANT CHANGE UP
EGFR: 114 ML/MIN/1.73M2 — SIGNIFICANT CHANGE UP
EGFR: 116 ML/MIN/1.73M2 — SIGNIFICANT CHANGE UP
EGFR: 116 ML/MIN/1.73M2 — SIGNIFICANT CHANGE UP
EGFR: 118 ML/MIN/1.73M2 — SIGNIFICANT CHANGE UP
EGFR: 121 ML/MIN/1.73M2 — SIGNIFICANT CHANGE UP
EGFR: 121 ML/MIN/1.73M2 — SIGNIFICANT CHANGE UP
EGFR: 99 ML/MIN/1.73M2
EOSINOPHIL # BLD AUTO: 0 K/UL — SIGNIFICANT CHANGE UP (ref 0–0.5)
EOSINOPHIL # BLD AUTO: 0.05 K/UL — SIGNIFICANT CHANGE UP (ref 0–0.5)
EOSINOPHIL # BLD AUTO: 0.05 K/UL — SIGNIFICANT CHANGE UP (ref 0–0.5)
EOSINOPHIL # BLD AUTO: 0.06 K/UL — SIGNIFICANT CHANGE UP (ref 0–0.5)
EOSINOPHIL # BLD AUTO: 0.13 K/UL — SIGNIFICANT CHANGE UP (ref 0–0.5)
EOSINOPHIL # BLD AUTO: 0.34 K/UL — SIGNIFICANT CHANGE UP (ref 0–0.5)
EOSINOPHIL # BLD AUTO: 0.34 K/UL — SIGNIFICANT CHANGE UP (ref 0–0.5)
EOSINOPHIL # BLD AUTO: 0.35 K/UL — SIGNIFICANT CHANGE UP (ref 0–0.5)
EOSINOPHIL # BLD AUTO: 0.44 K/UL — SIGNIFICANT CHANGE UP (ref 0–0.5)
EOSINOPHIL # BLD AUTO: 0.51 K/UL — HIGH (ref 0–0.5)
EOSINOPHIL NFR BLD AUTO: 0 % — SIGNIFICANT CHANGE UP (ref 0–6)
EOSINOPHIL NFR BLD AUTO: 0.6 % — SIGNIFICANT CHANGE UP (ref 0–6)
EOSINOPHIL NFR BLD AUTO: 2 % — SIGNIFICANT CHANGE UP (ref 0–6)
EOSINOPHIL NFR BLD AUTO: 4.4 % — SIGNIFICANT CHANGE UP (ref 0–6)
EOSINOPHIL NFR BLD AUTO: 5.6 % — SIGNIFICANT CHANGE UP (ref 0–6)
EOSINOPHIL NFR BLD AUTO: 6 % — SIGNIFICANT CHANGE UP (ref 0–6)
EOSINOPHIL NFR BLD AUTO: 6.8 % — HIGH (ref 0–6)
EOSINOPHIL NFR BLD AUTO: 7.9 % — HIGH (ref 0–6)
FERRITIN SERPL-MCNC: 366 NG/ML — HIGH (ref 13–330)
FERRITIN SERPL-MCNC: 366 NG/ML — HIGH (ref 13–330)
FLUID INTAKE SUBSTANCE CLASS: SIGNIFICANT CHANGE UP
FLUID INTAKE SUBSTANCE CLASS: SIGNIFICANT CHANGE UP
FOLATE+VIT B12 SERBLD-IMP: 3 % — SIGNIFICANT CHANGE UP
FOLATE+VIT B12 SERBLD-IMP: 3 % — SIGNIFICANT CHANGE UP
GAS PNL BLDV: 134 MMOL/L — LOW (ref 136–145)
GAS PNL BLDV: 134 MMOL/L — LOW (ref 136–145)
GI PCR PANEL: DETECTED
GI PCR PANEL: DETECTED
GIANT PLATELETS BLD QL SMEAR: PRESENT — SIGNIFICANT CHANGE UP
GIANT PLATELETS BLD QL SMEAR: PRESENT — SIGNIFICANT CHANGE UP
GLUCOSE BLDV-MCNC: 118 MG/DL — HIGH (ref 70–99)
GLUCOSE BLDV-MCNC: 118 MG/DL — HIGH (ref 70–99)
GLUCOSE FLD-MCNC: 116 MG/DL — SIGNIFICANT CHANGE UP
GLUCOSE FLD-MCNC: 116 MG/DL — SIGNIFICANT CHANGE UP
GLUCOSE SERPL-MCNC: 104 MG/DL
GLUCOSE SERPL-MCNC: 111 MG/DL
GLUCOSE SERPL-MCNC: 117 MG/DL
GLUCOSE SERPL-MCNC: 117 MG/DL — HIGH (ref 70–99)
GLUCOSE SERPL-MCNC: 117 MG/DL — HIGH (ref 70–99)
GLUCOSE SERPL-MCNC: 119 MG/DL — HIGH (ref 70–99)
GLUCOSE SERPL-MCNC: 119 MG/DL — HIGH (ref 70–99)
GLUCOSE SERPL-MCNC: 190 MG/DL — HIGH (ref 70–99)
GLUCOSE SERPL-MCNC: 190 MG/DL — HIGH (ref 70–99)
GLUCOSE SERPL-MCNC: 72 MG/DL
GLUCOSE SERPL-MCNC: 77 MG/DL
GLUCOSE SERPL-MCNC: 78 MG/DL
GLUCOSE SERPL-MCNC: 80 MG/DL
GLUCOSE SERPL-MCNC: 83 MG/DL — SIGNIFICANT CHANGE UP (ref 70–99)
GLUCOSE SERPL-MCNC: 83 MG/DL — SIGNIFICANT CHANGE UP (ref 70–99)
GLUCOSE SERPL-MCNC: 84 MG/DL — SIGNIFICANT CHANGE UP (ref 70–99)
GLUCOSE SERPL-MCNC: 84 MG/DL — SIGNIFICANT CHANGE UP (ref 70–99)
GLUCOSE SERPL-MCNC: 87 MG/DL
GLUCOSE SERPL-MCNC: 88 MG/DL — SIGNIFICANT CHANGE UP (ref 70–99)
GLUCOSE SERPL-MCNC: 88 MG/DL — SIGNIFICANT CHANGE UP (ref 70–99)
GLUCOSE SERPL-MCNC: 89 MG/DL — SIGNIFICANT CHANGE UP (ref 70–99)
GLUCOSE SERPL-MCNC: 89 MG/DL — SIGNIFICANT CHANGE UP (ref 70–99)
GLUCOSE SERPL-MCNC: 90 MG/DL — SIGNIFICANT CHANGE UP (ref 70–99)
GLUCOSE SERPL-MCNC: 90 MG/DL — SIGNIFICANT CHANGE UP (ref 70–99)
GLUCOSE SERPL-MCNC: 98 MG/DL — SIGNIFICANT CHANGE UP (ref 70–99)
GLUCOSE UR QL: NEGATIVE MG/DL — SIGNIFICANT CHANGE UP
GLUCOSE UR QL: NEGATIVE MG/DL — SIGNIFICANT CHANGE UP
GRAM STN FLD: SIGNIFICANT CHANGE UP
GRAM STN FLD: SIGNIFICANT CHANGE UP
HCO3 BLDV-SCNC: 35 MMOL/L — HIGH (ref 22–29)
HCO3 BLDV-SCNC: 35 MMOL/L — HIGH (ref 22–29)
HCT VFR BLD CALC: 25.6 % — LOW (ref 34.5–45)
HCT VFR BLD CALC: 25.6 % — LOW (ref 34.5–45)
HCT VFR BLD CALC: 26.2 % — LOW (ref 34.5–45)
HCT VFR BLD CALC: 26.2 % — LOW (ref 34.5–45)
HCT VFR BLD CALC: 27.1 % — LOW (ref 34.5–45)
HCT VFR BLD CALC: 27.1 % — LOW (ref 34.5–45)
HCT VFR BLD CALC: 27.8 % — LOW (ref 34.5–45)
HCT VFR BLD CALC: 27.8 % — LOW (ref 34.5–45)
HCT VFR BLD CALC: 28.8 % — LOW (ref 34.5–45)
HCT VFR BLD CALC: 28.8 % — LOW (ref 34.5–45)
HCT VFR BLD CALC: 29.9 % — LOW (ref 34.5–45)
HCT VFR BLD CALC: 29.9 % — LOW (ref 34.5–45)
HCT VFR BLD CALC: 30.3 % — LOW (ref 34.5–45)
HCT VFR BLD CALC: 30.3 % — LOW (ref 34.5–45)
HCT VFR BLD CALC: 31 % — LOW (ref 34.5–45)
HCT VFR BLD CALC: 31 % — LOW (ref 34.5–45)
HCT VFR BLD CALC: 32.6 % — LOW (ref 34.5–45)
HCT VFR BLD CALC: 32.8 % — LOW (ref 34.5–45)
HCT VFR BLD CALC: 32.8 % — LOW (ref 34.5–45)
HCT VFR BLD CALC: 33.2 % — LOW (ref 34.5–45)
HCT VFR BLD CALC: 33.9 % — LOW (ref 34.5–45)
HCT VFR BLD CALC: 34.1 % — LOW (ref 34.5–45)
HCT VFR BLD CALC: 35.1 % — SIGNIFICANT CHANGE UP (ref 34.5–45)
HCT VFR BLD CALC: 35.1 % — SIGNIFICANT CHANGE UP (ref 34.5–45)
HCT VFR BLD CALC: 35.5 % — SIGNIFICANT CHANGE UP (ref 34.5–45)
HCT VFR BLD CALC: 35.6 % — SIGNIFICANT CHANGE UP (ref 34.5–45)
HCT VFR BLD CALC: 35.8 % — SIGNIFICANT CHANGE UP (ref 34.5–45)
HCT VFR BLDA CALC: 35 % — SIGNIFICANT CHANGE UP (ref 34.5–46.5)
HCT VFR BLDA CALC: 35 % — SIGNIFICANT CHANGE UP (ref 34.5–46.5)
HDLC SERPL-MCNC: 117 MG/DL
HEMOGLOBIN INTERPRETATION: SIGNIFICANT CHANGE UP
HEMOGLOBIN INTERPRETATION: SIGNIFICANT CHANGE UP
HGB A MFR BLD: 60.1 % — LOW (ref 95–97.6)
HGB A MFR BLD: 60.1 % — LOW (ref 95–97.6)
HGB A2 MFR BLD: 2.8 % — SIGNIFICANT CHANGE UP (ref 2.4–3.5)
HGB A2 MFR BLD: 2.8 % — SIGNIFICANT CHANGE UP (ref 2.4–3.5)
HGB BLD CALC-MCNC: 11.6 G/DL — LOW (ref 11.7–16.1)
HGB BLD CALC-MCNC: 11.6 G/DL — LOW (ref 11.7–16.1)
HGB BLD-MCNC: 10 G/DL — LOW (ref 11.5–15.5)
HGB BLD-MCNC: 10 G/DL — LOW (ref 11.5–15.5)
HGB BLD-MCNC: 10.2 G/DL — LOW (ref 11.5–15.5)
HGB BLD-MCNC: 10.2 G/DL — LOW (ref 11.5–15.5)
HGB BLD-MCNC: 10.5 G/DL — LOW (ref 11.5–15.5)
HGB BLD-MCNC: 10.5 G/DL — LOW (ref 11.5–15.5)
HGB BLD-MCNC: 10.6 G/DL — LOW (ref 11.5–15.5)
HGB BLD-MCNC: 11 G/DL — LOW (ref 11.5–15.5)
HGB BLD-MCNC: 11 G/DL — LOW (ref 11.5–15.5)
HGB BLD-MCNC: 11.1 G/DL — LOW (ref 11.5–15.5)
HGB BLD-MCNC: 11.4 G/DL — LOW (ref 11.5–15.5)
HGB BLD-MCNC: 11.6 G/DL — SIGNIFICANT CHANGE UP (ref 11.5–15.5)
HGB BLD-MCNC: 12.2 G/DL — SIGNIFICANT CHANGE UP (ref 11.5–15.5)
HGB BLD-MCNC: 12.2 G/DL — SIGNIFICANT CHANGE UP (ref 11.5–15.5)
HGB BLD-MCNC: 8.9 G/DL — LOW (ref 11.5–15.5)
HGB BLD-MCNC: 8.9 G/DL — LOW (ref 11.5–15.5)
HGB BLD-MCNC: 9 G/DL — LOW (ref 11.5–15.5)
HGB BLD-MCNC: 9 G/DL — LOW (ref 11.5–15.5)
HGB BLD-MCNC: 9.2 G/DL — LOW (ref 11.5–15.5)
HGB BLD-MCNC: 9.2 G/DL — LOW (ref 11.5–15.5)
HGB BLD-MCNC: 9.6 G/DL — LOW (ref 11.5–15.5)
HGB BLD-MCNC: 9.6 G/DL — LOW (ref 11.5–15.5)
HGB BLD-MCNC: 9.8 G/DL — LOW (ref 11.5–15.5)
HGB BLD-MCNC: 9.8 G/DL — LOW (ref 11.5–15.5)
HGB C MFR BLD: 34.7 % — HIGH
HGB C MFR BLD: 34.7 % — HIGH
HGB F MFR BLD: <1 % — SIGNIFICANT CHANGE UP (ref 0–1.5)
HGB F MFR BLD: <1 % — SIGNIFICANT CHANGE UP (ref 0–1.5)
HGB S MFR BLD: 2.1 % — HIGH
HGB S MFR BLD: 2.1 % — HIGH
HIV 1+2 AB+HIV1 P24 AG SERPL QL IA: SIGNIFICANT CHANGE UP
HIV 1+2 AB+HIV1 P24 AG SERPL QL IA: SIGNIFICANT CHANGE UP
HYPOCHROMIA BLD QL: SLIGHT — SIGNIFICANT CHANGE UP
HYPOCHROMIA BLD QL: SLIGHT — SIGNIFICANT CHANGE UP
IANC: 13.48 K/UL — HIGH (ref 1.8–7.4)
IANC: 13.48 K/UL — HIGH (ref 1.8–7.4)
IANC: 15.4 K/UL — HIGH (ref 1.8–7.4)
IANC: 15.4 K/UL — HIGH (ref 1.8–7.4)
IMM GRANULOCYTES NFR BLD AUTO: 0.1 % — SIGNIFICANT CHANGE UP (ref 0–0.9)
IMM GRANULOCYTES NFR BLD AUTO: 0.2 % — SIGNIFICANT CHANGE UP (ref 0–0.9)
IMM GRANULOCYTES NFR BLD AUTO: 0.2 % — SIGNIFICANT CHANGE UP (ref 0–0.9)
IMM GRANULOCYTES NFR BLD AUTO: 0.3 % — SIGNIFICANT CHANGE UP (ref 0–0.9)
IMM GRANULOCYTES NFR BLD AUTO: 0.4 % — SIGNIFICANT CHANGE UP (ref 0–0.9)
IMM GRANULOCYTES NFR BLD AUTO: 0.4 % — SIGNIFICANT CHANGE UP (ref 0–0.9)
IMM GRANULOCYTES NFR BLD AUTO: 0.5 % — SIGNIFICANT CHANGE UP (ref 0–0.9)
IMM GRANULOCYTES NFR BLD AUTO: 0.5 % — SIGNIFICANT CHANGE UP (ref 0–0.9)
IMM GRANULOCYTES NFR BLD AUTO: 0.6 % — SIGNIFICANT CHANGE UP (ref 0–0.9)
IMM GRANULOCYTES NFR BLD AUTO: 0.9 % — SIGNIFICANT CHANGE UP (ref 0–0.9)
IMM GRANULOCYTES NFR BLD AUTO: 0.9 % — SIGNIFICANT CHANGE UP (ref 0–0.9)
INR BLD: 1.78 RATIO — HIGH (ref 0.85–1.18)
INR BLD: 1.78 RATIO — HIGH (ref 0.85–1.18)
INR BLD: 1.83 RATIO — HIGH (ref 0.85–1.18)
INR BLD: 1.83 RATIO — HIGH (ref 0.85–1.18)
INR PPP: 1.36 RATIO
IRON SATN MFR SERPL: 14 % — SIGNIFICANT CHANGE UP (ref 14–50)
IRON SATN MFR SERPL: 14 % — SIGNIFICANT CHANGE UP (ref 14–50)
IRON SATN MFR SERPL: 16 UG/DL — LOW (ref 30–160)
IRON SATN MFR SERPL: 16 UG/DL — LOW (ref 30–160)
KETONES UR-MCNC: ABNORMAL MG/DL
KETONES UR-MCNC: ABNORMAL MG/DL
LACTATE BLDV-MCNC: 2.2 MMOL/L — HIGH (ref 0.5–2)
LACTATE BLDV-MCNC: 2.2 MMOL/L — HIGH (ref 0.5–2)
LDH SERPL L TO P-CCNC: 97 U/L — SIGNIFICANT CHANGE UP
LDH SERPL L TO P-CCNC: 97 U/L — SIGNIFICANT CHANGE UP
LDLC SERPL CALC-MCNC: 117 MG/DL
LEUKOCYTE ESTERASE UR-ACNC: NEGATIVE — SIGNIFICANT CHANGE UP
LEUKOCYTE ESTERASE UR-ACNC: NEGATIVE — SIGNIFICANT CHANGE UP
LIDOCAIN IGE QN: 12 U/L — SIGNIFICANT CHANGE UP (ref 7–60)
LIDOCAIN IGE QN: 12 U/L — SIGNIFICANT CHANGE UP (ref 7–60)
LYMPHOCYTES # BLD AUTO: 0.68 K/UL — LOW (ref 1–3.3)
LYMPHOCYTES # BLD AUTO: 0.68 K/UL — LOW (ref 1–3.3)
LYMPHOCYTES # BLD AUTO: 0.88 K/UL — LOW (ref 1–3.3)
LYMPHOCYTES # BLD AUTO: 0.91 K/UL — LOW (ref 1–3.3)
LYMPHOCYTES # BLD AUTO: 0.91 K/UL — LOW (ref 1–3.3)
LYMPHOCYTES # BLD AUTO: 0.94 K/UL — LOW (ref 1–3.3)
LYMPHOCYTES # BLD AUTO: 1.1 K/UL — SIGNIFICANT CHANGE UP (ref 1–3.3)
LYMPHOCYTES # BLD AUTO: 1.1 K/UL — SIGNIFICANT CHANGE UP (ref 1–3.3)
LYMPHOCYTES # BLD AUTO: 1.11 K/UL — SIGNIFICANT CHANGE UP (ref 1–3.3)
LYMPHOCYTES # BLD AUTO: 1.26 K/UL — SIGNIFICANT CHANGE UP (ref 1–3.3)
LYMPHOCYTES # BLD AUTO: 1.27 K/UL — SIGNIFICANT CHANGE UP (ref 1–3.3)
LYMPHOCYTES # BLD AUTO: 1.29 K/UL — SIGNIFICANT CHANGE UP (ref 1–3.3)
LYMPHOCYTES # BLD AUTO: 1.54 K/UL — SIGNIFICANT CHANGE UP (ref 1–3.3)
LYMPHOCYTES # BLD AUTO: 13.5 % — SIGNIFICANT CHANGE UP (ref 13–44)
LYMPHOCYTES # BLD AUTO: 14.9 % — SIGNIFICANT CHANGE UP (ref 13–44)
LYMPHOCYTES # BLD AUTO: 15.8 % — SIGNIFICANT CHANGE UP (ref 13–44)
LYMPHOCYTES # BLD AUTO: 18.3 % — SIGNIFICANT CHANGE UP (ref 13–44)
LYMPHOCYTES # BLD AUTO: 18.4 % — SIGNIFICANT CHANGE UP (ref 13–44)
LYMPHOCYTES # BLD AUTO: 19.7 % — SIGNIFICANT CHANGE UP (ref 13–44)
LYMPHOCYTES # BLD AUTO: 19.9 % — SIGNIFICANT CHANGE UP (ref 13–44)
LYMPHOCYTES # BLD AUTO: 4.3 % — LOW (ref 13–44)
LYMPHOCYTES # BLD AUTO: 4.3 % — LOW (ref 13–44)
LYMPHOCYTES # BLD AUTO: 5.2 % — LOW (ref 13–44)
LYMPHOCYTES # BLD AUTO: 5.2 % — LOW (ref 13–44)
LYMPHOCYTES # FLD: 9 % — SIGNIFICANT CHANGE UP
LYMPHOCYTES # FLD: 9 % — SIGNIFICANT CHANGE UP
MAGNESIUM SERPL-MCNC: 1.9 MG/DL — SIGNIFICANT CHANGE UP (ref 1.6–2.6)
MAGNESIUM SERPL-MCNC: 2 MG/DL — SIGNIFICANT CHANGE UP (ref 1.6–2.6)
MAGNESIUM SERPL-MCNC: 2.1 MG/DL — SIGNIFICANT CHANGE UP (ref 1.6–2.6)
MAGNESIUM SERPL-MCNC: 2.3 MG/DL — SIGNIFICANT CHANGE UP (ref 1.6–2.6)
MCHC RBC-ENTMCNC: 19.5 PG — LOW (ref 27–34)
MCHC RBC-ENTMCNC: 19.7 PG — LOW (ref 27–34)
MCHC RBC-ENTMCNC: 19.8 PG — LOW (ref 27–34)
MCHC RBC-ENTMCNC: 19.8 PG — LOW (ref 27–34)
MCHC RBC-ENTMCNC: 19.9 PG — LOW (ref 27–34)
MCHC RBC-ENTMCNC: 19.9 PG — LOW (ref 27–34)
MCHC RBC-ENTMCNC: 20 PG — LOW (ref 27–34)
MCHC RBC-ENTMCNC: 20.1 PG — LOW (ref 27–34)
MCHC RBC-ENTMCNC: 20.1 PG — LOW (ref 27–34)
MCHC RBC-ENTMCNC: 21.7 PG — LOW (ref 27–34)
MCHC RBC-ENTMCNC: 23.2 PG — LOW (ref 27–34)
MCHC RBC-ENTMCNC: 24.5 PG — LOW (ref 27–34)
MCHC RBC-ENTMCNC: 24.7 PG — LOW (ref 27–34)
MCHC RBC-ENTMCNC: 25.5 PG — LOW (ref 27–34)
MCHC RBC-ENTMCNC: 25.6 PG — LOW (ref 27–34)
MCHC RBC-ENTMCNC: 32.5 G/DL — SIGNIFICANT CHANGE UP (ref 32–36)
MCHC RBC-ENTMCNC: 32.7 G/DL — SIGNIFICANT CHANGE UP (ref 32–36)
MCHC RBC-ENTMCNC: 33 G/DL — SIGNIFICANT CHANGE UP (ref 32–36)
MCHC RBC-ENTMCNC: 33 G/DL — SIGNIFICANT CHANGE UP (ref 32–36)
MCHC RBC-ENTMCNC: 33.4 G/DL — SIGNIFICANT CHANGE UP (ref 32–36)
MCHC RBC-ENTMCNC: 33.4 G/DL — SIGNIFICANT CHANGE UP (ref 32–36)
MCHC RBC-ENTMCNC: 33.4 GM/DL — SIGNIFICANT CHANGE UP (ref 32–36)
MCHC RBC-ENTMCNC: 33.4 GM/DL — SIGNIFICANT CHANGE UP (ref 32–36)
MCHC RBC-ENTMCNC: 33.5 GM/DL — SIGNIFICANT CHANGE UP (ref 32–36)
MCHC RBC-ENTMCNC: 33.5 GM/DL — SIGNIFICANT CHANGE UP (ref 32–36)
MCHC RBC-ENTMCNC: 33.7 GM/DL — SIGNIFICANT CHANGE UP (ref 32–36)
MCHC RBC-ENTMCNC: 33.7 GM/DL — SIGNIFICANT CHANGE UP (ref 32–36)
MCHC RBC-ENTMCNC: 33.9 GM/DL — SIGNIFICANT CHANGE UP (ref 32–36)
MCHC RBC-ENTMCNC: 34 GM/DL — SIGNIFICANT CHANGE UP (ref 32–36)
MCHC RBC-ENTMCNC: 34 GM/DL — SIGNIFICANT CHANGE UP (ref 32–36)
MCHC RBC-ENTMCNC: 34.1 G/DL — SIGNIFICANT CHANGE UP (ref 32–36)
MCHC RBC-ENTMCNC: 34.2 G/DL — SIGNIFICANT CHANGE UP (ref 32–36)
MCHC RBC-ENTMCNC: 34.3 G/DL — SIGNIFICANT CHANGE UP (ref 32–36)
MCHC RBC-ENTMCNC: 34.4 GM/DL — SIGNIFICANT CHANGE UP (ref 32–36)
MCHC RBC-ENTMCNC: 34.4 GM/DL — SIGNIFICANT CHANGE UP (ref 32–36)
MCHC RBC-ENTMCNC: 34.5 GM/DL — SIGNIFICANT CHANGE UP (ref 32–36)
MCHC RBC-ENTMCNC: 34.5 GM/DL — SIGNIFICANT CHANGE UP (ref 32–36)
MCHC RBC-ENTMCNC: 34.8 GM/DL — SIGNIFICANT CHANGE UP (ref 32–36)
MCHC RBC-ENTMCNC: 34.8 GM/DL — SIGNIFICANT CHANGE UP (ref 32–36)
MCV RBC AUTO: 57.2 FL — LOW (ref 80–100)
MCV RBC AUTO: 57.2 FL — LOW (ref 80–100)
MCV RBC AUTO: 57.6 FL — LOW (ref 80–100)
MCV RBC AUTO: 57.6 FL — LOW (ref 80–100)
MCV RBC AUTO: 57.7 FL — LOW (ref 80–100)
MCV RBC AUTO: 57.7 FL — LOW (ref 80–100)
MCV RBC AUTO: 57.9 FL — LOW (ref 80–100)
MCV RBC AUTO: 57.9 FL — LOW (ref 80–100)
MCV RBC AUTO: 58.2 FL — LOW (ref 80–100)
MCV RBC AUTO: 58.2 FL — LOW (ref 80–100)
MCV RBC AUTO: 58.6 FL — LOW (ref 80–100)
MCV RBC AUTO: 58.6 FL — LOW (ref 80–100)
MCV RBC AUTO: 58.9 FL — LOW (ref 80–100)
MCV RBC AUTO: 58.9 FL — LOW (ref 80–100)
MCV RBC AUTO: 59 FL — LOW (ref 80–100)
MCV RBC AUTO: 59.1 FL — LOW (ref 80–100)
MCV RBC AUTO: 59.1 FL — LOW (ref 80–100)
MCV RBC AUTO: 61.5 FL — LOW (ref 80–100)
MCV RBC AUTO: 66.4 FL — LOW (ref 80–100)
MCV RBC AUTO: 69.3 FL — LOW (ref 80–100)
MCV RBC AUTO: 72.6 FL — LOW (ref 80–100)
MCV RBC AUTO: 73.3 FL — LOW (ref 80–100)
MCV RBC AUTO: 74.3 FL — LOW (ref 80–100)
MCV RBC AUTO: 74.7 FL — LOW (ref 80–100)
MESOTHL CELL # FLD: 2 % — SIGNIFICANT CHANGE UP
MESOTHL CELL # FLD: 2 % — SIGNIFICANT CHANGE UP
MICROCYTES BLD QL: SIGNIFICANT CHANGE UP
MICROCYTES BLD QL: SIGNIFICANT CHANGE UP
MONOCYTES # BLD AUTO: 0.36 K/UL — SIGNIFICANT CHANGE UP (ref 0–0.9)
MONOCYTES # BLD AUTO: 0.36 K/UL — SIGNIFICANT CHANGE UP (ref 0–0.9)
MONOCYTES # BLD AUTO: 0.46 K/UL — SIGNIFICANT CHANGE UP (ref 0–0.9)
MONOCYTES # BLD AUTO: 0.56 K/UL — SIGNIFICANT CHANGE UP (ref 0–0.9)
MONOCYTES # BLD AUTO: 0.57 K/UL — SIGNIFICANT CHANGE UP (ref 0–0.9)
MONOCYTES # BLD AUTO: 0.58 K/UL — SIGNIFICANT CHANGE UP (ref 0–0.9)
MONOCYTES # BLD AUTO: 0.59 K/UL — SIGNIFICANT CHANGE UP (ref 0–0.9)
MONOCYTES # BLD AUTO: 0.76 K/UL — SIGNIFICANT CHANGE UP (ref 0–0.9)
MONOCYTES # BLD AUTO: 0.89 K/UL — SIGNIFICANT CHANGE UP (ref 0–0.9)
MONOCYTES # BLD AUTO: 0.89 K/UL — SIGNIFICANT CHANGE UP (ref 0–0.9)
MONOCYTES # BLD AUTO: 0.92 K/UL — HIGH (ref 0–0.9)
MONOCYTES # BLD AUTO: 1.37 K/UL — HIGH (ref 0–0.9)
MONOCYTES # BLD AUTO: 1.37 K/UL — HIGH (ref 0–0.9)
MONOCYTES NFR BLD AUTO: 10.8 % — SIGNIFICANT CHANGE UP (ref 2–14)
MONOCYTES NFR BLD AUTO: 11.2 % — SIGNIFICANT CHANGE UP (ref 2–14)
MONOCYTES NFR BLD AUTO: 4.4 % — SIGNIFICANT CHANGE UP (ref 2–14)
MONOCYTES NFR BLD AUTO: 4.4 % — SIGNIFICANT CHANGE UP (ref 2–14)
MONOCYTES NFR BLD AUTO: 5.1 % — SIGNIFICANT CHANGE UP (ref 2–14)
MONOCYTES NFR BLD AUTO: 5.1 % — SIGNIFICANT CHANGE UP (ref 2–14)
MONOCYTES NFR BLD AUTO: 7.4 % — SIGNIFICANT CHANGE UP (ref 2–14)
MONOCYTES NFR BLD AUTO: 8.2 % — SIGNIFICANT CHANGE UP (ref 2–14)
MONOCYTES NFR BLD AUTO: 8.3 % — SIGNIFICANT CHANGE UP (ref 2–14)
MONOCYTES NFR BLD AUTO: 8.7 % — SIGNIFICANT CHANGE UP (ref 2–14)
MONOCYTES NFR BLD AUTO: 9.8 % — SIGNIFICANT CHANGE UP (ref 2–14)
MONOS+MACROS # FLD: 49 % — SIGNIFICANT CHANGE UP
MONOS+MACROS # FLD: 49 % — SIGNIFICANT CHANGE UP
NEUTROPHILS # BLD AUTO: 13.73 K/UL — HIGH (ref 1.8–7.4)
NEUTROPHILS # BLD AUTO: 13.73 K/UL — HIGH (ref 1.8–7.4)
NEUTROPHILS # BLD AUTO: 15.4 K/UL — HIGH (ref 1.8–7.4)
NEUTROPHILS # BLD AUTO: 15.4 K/UL — HIGH (ref 1.8–7.4)
NEUTROPHILS # BLD AUTO: 3.2 K/UL — SIGNIFICANT CHANGE UP (ref 1.8–7.4)
NEUTROPHILS # BLD AUTO: 3.75 K/UL — SIGNIFICANT CHANGE UP (ref 1.8–7.4)
NEUTROPHILS # BLD AUTO: 3.96 K/UL — SIGNIFICANT CHANGE UP (ref 1.8–7.4)
NEUTROPHILS # BLD AUTO: 4.42 K/UL — SIGNIFICANT CHANGE UP (ref 1.8–7.4)
NEUTROPHILS # BLD AUTO: 5.28 K/UL — SIGNIFICANT CHANGE UP (ref 1.8–7.4)
NEUTROPHILS # BLD AUTO: 5.78 K/UL — SIGNIFICANT CHANGE UP (ref 1.8–7.4)
NEUTROPHILS # BLD AUTO: 6.57 K/UL — SIGNIFICANT CHANGE UP (ref 1.8–7.4)
NEUTROPHILS # BLD AUTO: 6.57 K/UL — SIGNIFICANT CHANGE UP (ref 1.8–7.4)
NEUTROPHILS # BLD AUTO: 7.16 K/UL — SIGNIFICANT CHANGE UP (ref 1.8–7.4)
NEUTROPHILS NFR BLD AUTO: 62.6 % — SIGNIFICANT CHANGE UP (ref 43–77)
NEUTROPHILS NFR BLD AUTO: 65.4 % — SIGNIFICANT CHANGE UP (ref 43–77)
NEUTROPHILS NFR BLD AUTO: 67.3 % — SIGNIFICANT CHANGE UP (ref 43–77)
NEUTROPHILS NFR BLD AUTO: 67.6 % — SIGNIFICANT CHANGE UP (ref 43–77)
NEUTROPHILS NFR BLD AUTO: 67.7 % — SIGNIFICANT CHANGE UP (ref 43–77)
NEUTROPHILS NFR BLD AUTO: 68.3 % — SIGNIFICANT CHANGE UP (ref 43–77)
NEUTROPHILS NFR BLD AUTO: 76.9 % — SIGNIFICANT CHANGE UP (ref 43–77)
NEUTROPHILS NFR BLD AUTO: 80.8 % — HIGH (ref 43–77)
NEUTROPHILS NFR BLD AUTO: 80.8 % — HIGH (ref 43–77)
NEUTROPHILS NFR BLD AUTO: 87 % — HIGH (ref 43–77)
NEUTROPHILS NFR BLD AUTO: 87 % — HIGH (ref 43–77)
NEUTROPHILS NFR BLD AUTO: 88.7 % — HIGH (ref 43–77)
NEUTROPHILS NFR BLD AUTO: 88.7 % — HIGH (ref 43–77)
NEUTROPHILS-BODY FLUID: 37 % — SIGNIFICANT CHANGE UP
NEUTROPHILS-BODY FLUID: 37 % — SIGNIFICANT CHANGE UP
NITRITE UR-MCNC: NEGATIVE — SIGNIFICANT CHANGE UP
NITRITE UR-MCNC: NEGATIVE — SIGNIFICANT CHANGE UP
NONHDLC SERPL-MCNC: 135 MG/DL
NRBC # BLD: 0 /100 WBCS — SIGNIFICANT CHANGE UP (ref 0–0)
NRBC # BLD: 1 /100 — HIGH (ref 0–0)
NRBC # BLD: 1 /100 — HIGH (ref 0–0)
NRBC # FLD: 0 K/UL — SIGNIFICANT CHANGE UP (ref 0–0)
NRBC # FLD: 0.02 K/UL — HIGH (ref 0–0)
NRBC # FLD: 1 % — SIGNIFICANT CHANGE UP
NRBC # FLD: 1 % — SIGNIFICANT CHANGE UP
NT-PROBNP SERPL-SCNC: 63 PG/ML — SIGNIFICANT CHANGE UP
NT-PROBNP SERPL-SCNC: 63 PG/ML — SIGNIFICANT CHANGE UP
PCO2 BLDV: 56 MMHG — HIGH (ref 39–52)
PCO2 BLDV: 56 MMHG — HIGH (ref 39–52)
PH BLDV: 7.4 — SIGNIFICANT CHANGE UP (ref 7.32–7.43)
PH BLDV: 7.4 — SIGNIFICANT CHANGE UP (ref 7.32–7.43)
PH UR: 6 — SIGNIFICANT CHANGE UP (ref 5–8)
PH UR: 6 — SIGNIFICANT CHANGE UP (ref 5–8)
PHOSPHATE SERPL-MCNC: 2.1 MG/DL — LOW (ref 2.5–4.5)
PHOSPHATE SERPL-MCNC: 2.2 MG/DL — LOW (ref 2.5–4.5)
PHOSPHATE SERPL-MCNC: 2.2 MG/DL — LOW (ref 2.5–4.5)
PHOSPHATE SERPL-MCNC: 2.4 MG/DL — LOW (ref 2.5–4.5)
PHOSPHATE SERPL-MCNC: 2.4 MG/DL — LOW (ref 2.5–4.5)
PHOSPHATE SERPL-MCNC: 2.5 MG/DL — SIGNIFICANT CHANGE UP (ref 2.5–4.5)
PHOSPHATE SERPL-MCNC: 2.5 MG/DL — SIGNIFICANT CHANGE UP (ref 2.5–4.5)
PHOSPHATE SERPL-MCNC: 2.7 MG/DL — SIGNIFICANT CHANGE UP (ref 2.5–4.5)
PLAT MORPH BLD: NORMAL — SIGNIFICANT CHANGE UP
PLAT MORPH BLD: NORMAL — SIGNIFICANT CHANGE UP
PLATELET # BLD AUTO: 264 K/UL — SIGNIFICANT CHANGE UP (ref 150–400)
PLATELET # BLD AUTO: 264 K/UL — SIGNIFICANT CHANGE UP (ref 150–400)
PLATELET # BLD AUTO: 265 K/UL — SIGNIFICANT CHANGE UP (ref 150–400)
PLATELET # BLD AUTO: 275 K/UL — SIGNIFICANT CHANGE UP (ref 150–400)
PLATELET # BLD AUTO: 352 K/UL — SIGNIFICANT CHANGE UP (ref 150–400)
PLATELET # BLD AUTO: 386 K/UL — SIGNIFICANT CHANGE UP (ref 150–400)
PLATELET # BLD AUTO: 445 K/UL — HIGH (ref 150–400)
PLATELET # BLD AUTO: 528 K/UL — HIGH (ref 150–400)
PLATELET # BLD AUTO: 528 K/UL — HIGH (ref 150–400)
PLATELET # BLD AUTO: 534 K/UL — HIGH (ref 150–400)
PLATELET # BLD AUTO: 534 K/UL — HIGH (ref 150–400)
PLATELET # BLD AUTO: 550 K/UL — HIGH (ref 150–400)
PLATELET # BLD AUTO: 552 K/UL — HIGH (ref 150–400)
PLATELET # BLD AUTO: 552 K/UL — HIGH (ref 150–400)
PLATELET # BLD AUTO: 556 K/UL — HIGH (ref 150–400)
PLATELET # BLD AUTO: 556 K/UL — HIGH (ref 150–400)
PLATELET # BLD AUTO: 557 K/UL — HIGH (ref 150–400)
PLATELET # BLD AUTO: 557 K/UL — HIGH (ref 150–400)
PLATELET # BLD AUTO: 564 K/UL — HIGH (ref 150–400)
PLATELET # BLD AUTO: 568 K/UL — HIGH (ref 150–400)
PLATELET # BLD AUTO: 568 K/UL — HIGH (ref 150–400)
PLATELET # BLD AUTO: 570 K/UL — HIGH (ref 150–400)
PLATELET # BLD AUTO: 570 K/UL — HIGH (ref 150–400)
PLATELET # BLD AUTO: 574 K/UL — HIGH (ref 150–400)
PLATELET # BLD AUTO: 574 K/UL — HIGH (ref 150–400)
PLATELET # BLD AUTO: 660 K/UL — HIGH (ref 150–400)
PLATELET # BLD AUTO: 660 K/UL — HIGH (ref 150–400)
PLATELET COUNT - ESTIMATE: ABNORMAL
PLATELET COUNT - ESTIMATE: ABNORMAL
PO2 BLDV: 26 MMHG — SIGNIFICANT CHANGE UP (ref 25–45)
PO2 BLDV: 26 MMHG — SIGNIFICANT CHANGE UP (ref 25–45)
POIKILOCYTOSIS BLD QL AUTO: SLIGHT — SIGNIFICANT CHANGE UP
POIKILOCYTOSIS BLD QL AUTO: SLIGHT — SIGNIFICANT CHANGE UP
POLYCHROMASIA BLD QL SMEAR: SLIGHT — SIGNIFICANT CHANGE UP
POLYCHROMASIA BLD QL SMEAR: SLIGHT — SIGNIFICANT CHANGE UP
POTASSIUM BLDV-SCNC: 4.2 MMOL/L — SIGNIFICANT CHANGE UP (ref 3.5–5.1)
POTASSIUM BLDV-SCNC: 4.2 MMOL/L — SIGNIFICANT CHANGE UP (ref 3.5–5.1)
POTASSIUM SERPL-MCNC: 3.2 MMOL/L — LOW (ref 3.5–5.3)
POTASSIUM SERPL-MCNC: 3.2 MMOL/L — LOW (ref 3.5–5.3)
POTASSIUM SERPL-MCNC: 3.3 MMOL/L — LOW (ref 3.5–5.3)
POTASSIUM SERPL-MCNC: 3.3 MMOL/L — LOW (ref 3.5–5.3)
POTASSIUM SERPL-MCNC: 3.6 MMOL/L — SIGNIFICANT CHANGE UP (ref 3.5–5.3)
POTASSIUM SERPL-MCNC: 3.6 MMOL/L — SIGNIFICANT CHANGE UP (ref 3.5–5.3)
POTASSIUM SERPL-MCNC: 3.7 MMOL/L — SIGNIFICANT CHANGE UP (ref 3.5–5.3)
POTASSIUM SERPL-MCNC: 3.7 MMOL/L — SIGNIFICANT CHANGE UP (ref 3.5–5.3)
POTASSIUM SERPL-MCNC: 3.8 MMOL/L — SIGNIFICANT CHANGE UP (ref 3.5–5.3)
POTASSIUM SERPL-MCNC: 3.8 MMOL/L — SIGNIFICANT CHANGE UP (ref 3.5–5.3)
POTASSIUM SERPL-MCNC: 4.1 MMOL/L — SIGNIFICANT CHANGE UP (ref 3.5–5.3)
POTASSIUM SERPL-MCNC: 8.9 MMOL/L — CRITICAL HIGH (ref 3.5–5.3)
POTASSIUM SERPL-SCNC: 3.2 MMOL/L — LOW (ref 3.5–5.3)
POTASSIUM SERPL-SCNC: 3.2 MMOL/L — LOW (ref 3.5–5.3)
POTASSIUM SERPL-SCNC: 3.3 MMOL/L — LOW (ref 3.5–5.3)
POTASSIUM SERPL-SCNC: 3.3 MMOL/L — LOW (ref 3.5–5.3)
POTASSIUM SERPL-SCNC: 3.6 MMOL/L — SIGNIFICANT CHANGE UP (ref 3.5–5.3)
POTASSIUM SERPL-SCNC: 3.6 MMOL/L — SIGNIFICANT CHANGE UP (ref 3.5–5.3)
POTASSIUM SERPL-SCNC: 3.7 MMOL/L
POTASSIUM SERPL-SCNC: 3.7 MMOL/L
POTASSIUM SERPL-SCNC: 3.7 MMOL/L — SIGNIFICANT CHANGE UP (ref 3.5–5.3)
POTASSIUM SERPL-SCNC: 3.7 MMOL/L — SIGNIFICANT CHANGE UP (ref 3.5–5.3)
POTASSIUM SERPL-SCNC: 3.8 MMOL/L — SIGNIFICANT CHANGE UP (ref 3.5–5.3)
POTASSIUM SERPL-SCNC: 3.8 MMOL/L — SIGNIFICANT CHANGE UP (ref 3.5–5.3)
POTASSIUM SERPL-SCNC: 4.1 MMOL/L — SIGNIFICANT CHANGE UP (ref 3.5–5.3)
POTASSIUM SERPL-SCNC: 4.2 MMOL/L
POTASSIUM SERPL-SCNC: 4.2 MMOL/L
POTASSIUM SERPL-SCNC: 4.3 MMOL/L
POTASSIUM SERPL-SCNC: 4.5 MMOL/L
POTASSIUM SERPL-SCNC: 4.6 MMOL/L
POTASSIUM SERPL-SCNC: 4.7 MMOL/L
POTASSIUM SERPL-SCNC: 8.9 MMOL/L — CRITICAL HIGH (ref 3.5–5.3)
PROT FLD-MCNC: 1.1 G/DL — SIGNIFICANT CHANGE UP
PROT FLD-MCNC: 1.1 G/DL — SIGNIFICANT CHANGE UP
PROT SERPL-MCNC: 5.9 G/DL — LOW (ref 6–8.3)
PROT SERPL-MCNC: 5.9 G/DL — LOW (ref 6–8.3)
PROT SERPL-MCNC: 6.4 G/DL — SIGNIFICANT CHANGE UP (ref 6–8.3)
PROT SERPL-MCNC: 6.4 G/DL — SIGNIFICANT CHANGE UP (ref 6–8.3)
PROT SERPL-MCNC: 6.9 G/DL — SIGNIFICANT CHANGE UP (ref 6–8.3)
PROT SERPL-MCNC: 6.9 G/DL — SIGNIFICANT CHANGE UP (ref 6–8.3)
PROT SERPL-MCNC: 7.3 G/DL
PROT SERPL-MCNC: 7.4 G/DL
PROT SERPL-MCNC: 7.5 G/DL
PROT SERPL-MCNC: 7.6 G/DL
PROT SERPL-MCNC: 7.7 G/DL
PROT SERPL-MCNC: 7.8 G/DL
PROT SERPL-MCNC: 8.2 G/DL
PROT SERPL-MCNC: 8.5 G/DL
PROT SERPL-MCNC: 9.4 G/DL — HIGH (ref 6–8.3)
PROT UR-MCNC: 30 MG/DL
PROT UR-MCNC: 30 MG/DL
PROTHROM AB SERPL-ACNC: 19.8 SEC — HIGH (ref 9.5–13)
PROTHROM AB SERPL-ACNC: 19.8 SEC — HIGH (ref 9.5–13)
PROTHROM AB SERPL-ACNC: 20.3 SEC — HIGH (ref 9.5–13)
PROTHROM AB SERPL-ACNC: 20.3 SEC — HIGH (ref 9.5–13)
PT BLD: 15.4 SEC
RBC # BLD: 4.42 M/UL — SIGNIFICANT CHANGE UP (ref 3.8–5.2)
RBC # BLD: 4.42 M/UL — SIGNIFICANT CHANGE UP (ref 3.8–5.2)
RBC # BLD: 4.54 M/UL — SIGNIFICANT CHANGE UP (ref 3.8–5.2)
RBC # BLD: 4.58 M/UL — SIGNIFICANT CHANGE UP (ref 3.8–5.2)
RBC # BLD: 4.58 M/UL — SIGNIFICANT CHANGE UP (ref 3.8–5.2)
RBC # BLD: 4.65 M/UL — SIGNIFICANT CHANGE UP (ref 3.8–5.2)
RBC # BLD: 4.66 M/UL — SIGNIFICANT CHANGE UP (ref 3.8–5.2)
RBC # BLD: 4.66 M/UL — SIGNIFICANT CHANGE UP (ref 3.8–5.2)
RBC # BLD: 4.79 M/UL — SIGNIFICANT CHANGE UP (ref 3.8–5.2)
RBC # BLD: 4.79 M/UL — SIGNIFICANT CHANGE UP (ref 3.8–5.2)
RBC # BLD: 4.82 M/UL — SIGNIFICANT CHANGE UP (ref 3.8–5.2)
RBC # BLD: 4.82 M/UL — SIGNIFICANT CHANGE UP (ref 3.8–5.2)
RBC # BLD: 4.89 M/UL — SIGNIFICANT CHANGE UP (ref 3.8–5.2)
RBC # BLD: 4.89 M/UL — SIGNIFICANT CHANGE UP (ref 3.8–5.2)
RBC # BLD: 4.93 M/UL — SIGNIFICANT CHANGE UP (ref 3.8–5.2)
RBC # BLD: 5.07 M/UL — SIGNIFICANT CHANGE UP (ref 3.8–5.2)
RBC # BLD: 5.07 M/UL — SIGNIFICANT CHANGE UP (ref 3.8–5.2)
RBC # BLD: 5.17 M/UL — SIGNIFICANT CHANGE UP (ref 3.8–5.2)
RBC # BLD: 5.17 M/UL — SIGNIFICANT CHANGE UP (ref 3.8–5.2)
RBC # BLD: 5.3 M/UL — HIGH (ref 3.8–5.2)
RBC # BLD: 5.35 M/UL — HIGH (ref 3.8–5.2)
RBC # BLD: 5.38 M/UL — HIGH (ref 3.8–5.2)
RBC # BLD: 5.38 M/UL — HIGH (ref 3.8–5.2)
RBC # BLD: 5.56 M/UL — HIGH (ref 3.8–5.2)
RBC # BLD: 5.56 M/UL — HIGH (ref 3.8–5.2)
RBC # BLD: 5.94 M/UL — HIGH (ref 3.8–5.2)
RBC # BLD: 5.94 M/UL — HIGH (ref 3.8–5.2)
RBC # FLD: 15.9 % — HIGH (ref 10.3–14.5)
RBC # FLD: 16.1 % — HIGH (ref 10.3–14.5)
RBC # FLD: 16.6 % — HIGH (ref 10.3–14.5)
RBC # FLD: 17.1 % — HIGH (ref 10.3–14.5)
RBC # FLD: 17.5 % — HIGH (ref 10.3–14.5)
RBC # FLD: 18.8 % — HIGH (ref 10.3–14.5)
RBC # FLD: 19 % — HIGH (ref 10.3–14.5)
RBC # FLD: 22.6 % — HIGH (ref 10.3–14.5)
RBC # FLD: 22.6 % — HIGH (ref 10.3–14.5)
RBC # FLD: 24.1 % — HIGH (ref 10.3–14.5)
RBC # FLD: 24.1 % — HIGH (ref 10.3–14.5)
RBC # FLD: 24.4 % — HIGH (ref 10.3–14.5)
RBC # FLD: 24.4 % — HIGH (ref 10.3–14.5)
RBC # FLD: 24.5 % — HIGH (ref 10.3–14.5)
RBC # FLD: 24.8 % — HIGH (ref 10.3–14.5)
RBC # FLD: 25.2 % — HIGH (ref 10.3–14.5)
RBC # FLD: 25.2 % — HIGH (ref 10.3–14.5)
RBC # FLD: 25.3 % — HIGH (ref 10.3–14.5)
RBC # FLD: 25.3 % — HIGH (ref 10.3–14.5)
RBC BLD AUTO: ABNORMAL
RBC BLD AUTO: ABNORMAL
RBC CASTS # UR COMP ASSIST: 4 /HPF — SIGNIFICANT CHANGE UP (ref 0–4)
RBC CASTS # UR COMP ASSIST: 4 /HPF — SIGNIFICANT CHANGE UP (ref 0–4)
RCV VOL RI: HIGH CELLS/UL (ref 0–5)
RCV VOL RI: HIGH CELLS/UL (ref 0–5)
REVIEW: SIGNIFICANT CHANGE UP
REVIEW: SIGNIFICANT CHANGE UP
RH IG SCN BLD-IMP: POSITIVE — SIGNIFICANT CHANGE UP
RH IG SCN BLD-IMP: POSITIVE — SIGNIFICANT CHANGE UP
SAO2 % BLDV: 33.4 % — LOW (ref 67–88)
SAO2 % BLDV: 33.4 % — LOW (ref 67–88)
SEROTONIN SERUM: 1018 NG/ML
SEROTONIN SERUM: 1063 NG/ML
SEROTONIN SERUM: 1585 NG/ML
SEROTONIN SERUM: 2123 NG/ML
SODIUM SERPL-SCNC: 132 MMOL/L — LOW (ref 135–145)
SODIUM SERPL-SCNC: 133 MMOL/L — LOW (ref 135–145)
SODIUM SERPL-SCNC: 133 MMOL/L — LOW (ref 135–145)
SODIUM SERPL-SCNC: 134 MMOL/L — LOW (ref 135–145)
SODIUM SERPL-SCNC: 136 MMOL/L — SIGNIFICANT CHANGE UP (ref 135–145)
SODIUM SERPL-SCNC: 137 MMOL/L — SIGNIFICANT CHANGE UP (ref 135–145)
SODIUM SERPL-SCNC: 137 MMOL/L — SIGNIFICANT CHANGE UP (ref 135–145)
SODIUM SERPL-SCNC: 138 MMOL/L — SIGNIFICANT CHANGE UP (ref 135–145)
SODIUM SERPL-SCNC: 138 MMOL/L — SIGNIFICANT CHANGE UP (ref 135–145)
SODIUM SERPL-SCNC: 139 MMOL/L
SODIUM SERPL-SCNC: 139 MMOL/L
SODIUM SERPL-SCNC: 140 MMOL/L
SODIUM SERPL-SCNC: 141 MMOL/L
SP GR SPEC: 1.04 — HIGH (ref 1–1.03)
SP GR SPEC: 1.04 — HIGH (ref 1–1.03)
SPECIMEN SOURCE FLD: SIGNIFICANT CHANGE UP
SPECIMEN SOURCE FLD: SIGNIFICANT CHANGE UP
SPECIMEN SOURCE: SIGNIFICANT CHANGE UP
SQUAMOUS # UR AUTO: 3 /HPF — SIGNIFICANT CHANGE UP (ref 0–5)
SQUAMOUS # UR AUTO: 3 /HPF — SIGNIFICANT CHANGE UP (ref 0–5)
TARGETS BLD QL SMEAR: SIGNIFICANT CHANGE UP
TARGETS BLD QL SMEAR: SIGNIFICANT CHANGE UP
TIBC SERPL-MCNC: 118 UG/DL — LOW (ref 220–430)
TIBC SERPL-MCNC: 118 UG/DL — LOW (ref 220–430)
TOTAL NUCLEATED CELL COUNT, BODY FLUID: 70 CELLS/UL — HIGH (ref 0–5)
TOTAL NUCLEATED CELL COUNT, BODY FLUID: 70 CELLS/UL — HIGH (ref 0–5)
TRIGL SERPL-MCNC: 110 MG/DL
TROPONIN T, HIGH SENSITIVITY RESULT: 6 NG/L — SIGNIFICANT CHANGE UP
TROPONIN T, HIGH SENSITIVITY RESULT: 6 NG/L — SIGNIFICANT CHANGE UP
TUBE TYPE: SIGNIFICANT CHANGE UP
TUBE TYPE: SIGNIFICANT CHANGE UP
UIBC SERPL-MCNC: 102 UG/DL — LOW (ref 110–370)
UIBC SERPL-MCNC: 102 UG/DL — LOW (ref 110–370)
UROBILINOGEN FLD QL: 1 MG/DL — SIGNIFICANT CHANGE UP (ref 0.2–1)
UROBILINOGEN FLD QL: 1 MG/DL — SIGNIFICANT CHANGE UP (ref 0.2–1)
VIBRIO CHOL TOXIN CTXA STL QL NAA+PROBE: DETECTED
VIBRIO CHOL TOXIN CTXA STL QL NAA+PROBE: DETECTED
WBC # BLD: 12.37 K/UL — HIGH (ref 3.8–10.5)
WBC # BLD: 12.37 K/UL — HIGH (ref 3.8–10.5)
WBC # BLD: 12.89 K/UL — HIGH (ref 3.8–10.5)
WBC # BLD: 12.89 K/UL — HIGH (ref 3.8–10.5)
WBC # BLD: 13.81 K/UL — HIGH (ref 3.8–10.5)
WBC # BLD: 13.81 K/UL — HIGH (ref 3.8–10.5)
WBC # BLD: 13.97 K/UL — HIGH (ref 3.8–10.5)
WBC # BLD: 13.97 K/UL — HIGH (ref 3.8–10.5)
WBC # BLD: 14.2 K/UL — HIGH (ref 3.8–10.5)
WBC # BLD: 14.2 K/UL — HIGH (ref 3.8–10.5)
WBC # BLD: 15.78 K/UL — HIGH (ref 3.8–10.5)
WBC # BLD: 15.78 K/UL — HIGH (ref 3.8–10.5)
WBC # BLD: 17.38 K/UL — HIGH (ref 3.8–10.5)
WBC # BLD: 17.38 K/UL — HIGH (ref 3.8–10.5)
WBC # BLD: 18.08 K/UL — HIGH (ref 3.8–10.5)
WBC # BLD: 18.08 K/UL — HIGH (ref 3.8–10.5)
WBC # BLD: 20.14 K/UL — HIGH (ref 3.8–10.5)
WBC # BLD: 20.14 K/UL — HIGH (ref 3.8–10.5)
WBC # BLD: 5.11 K/UL — SIGNIFICANT CHANGE UP (ref 3.8–10.5)
WBC # BLD: 5.57 K/UL — SIGNIFICANT CHANGE UP (ref 3.8–10.5)
WBC # BLD: 6.05 K/UL — SIGNIFICANT CHANGE UP (ref 3.8–10.5)
WBC # BLD: 6.47 K/UL — SIGNIFICANT CHANGE UP (ref 3.8–10.5)
WBC # BLD: 7.81 K/UL — SIGNIFICANT CHANGE UP (ref 3.8–10.5)
WBC # BLD: 8.14 K/UL — SIGNIFICANT CHANGE UP (ref 3.8–10.5)
WBC # BLD: 8.14 K/UL — SIGNIFICANT CHANGE UP (ref 3.8–10.5)
WBC # BLD: 8.53 K/UL — SIGNIFICANT CHANGE UP (ref 3.8–10.5)
WBC # BLD: 9.32 K/UL — SIGNIFICANT CHANGE UP (ref 3.8–10.5)
WBC # FLD AUTO: 12.37 K/UL — HIGH (ref 3.8–10.5)
WBC # FLD AUTO: 12.37 K/UL — HIGH (ref 3.8–10.5)
WBC # FLD AUTO: 12.89 K/UL — HIGH (ref 3.8–10.5)
WBC # FLD AUTO: 12.89 K/UL — HIGH (ref 3.8–10.5)
WBC # FLD AUTO: 13.81 K/UL — HIGH (ref 3.8–10.5)
WBC # FLD AUTO: 13.81 K/UL — HIGH (ref 3.8–10.5)
WBC # FLD AUTO: 13.97 K/UL — HIGH (ref 3.8–10.5)
WBC # FLD AUTO: 13.97 K/UL — HIGH (ref 3.8–10.5)
WBC # FLD AUTO: 14.2 K/UL — HIGH (ref 3.8–10.5)
WBC # FLD AUTO: 14.2 K/UL — HIGH (ref 3.8–10.5)
WBC # FLD AUTO: 15.78 K/UL — HIGH (ref 3.8–10.5)
WBC # FLD AUTO: 15.78 K/UL — HIGH (ref 3.8–10.5)
WBC # FLD AUTO: 17.38 K/UL — HIGH (ref 3.8–10.5)
WBC # FLD AUTO: 17.38 K/UL — HIGH (ref 3.8–10.5)
WBC # FLD AUTO: 18.08 K/UL — HIGH (ref 3.8–10.5)
WBC # FLD AUTO: 18.08 K/UL — HIGH (ref 3.8–10.5)
WBC # FLD AUTO: 20.14 K/UL — HIGH (ref 3.8–10.5)
WBC # FLD AUTO: 20.14 K/UL — HIGH (ref 3.8–10.5)
WBC # FLD AUTO: 5.11 K/UL — SIGNIFICANT CHANGE UP (ref 3.8–10.5)
WBC # FLD AUTO: 5.57 K/UL — SIGNIFICANT CHANGE UP (ref 3.8–10.5)
WBC # FLD AUTO: 6.05 K/UL — SIGNIFICANT CHANGE UP (ref 3.8–10.5)
WBC # FLD AUTO: 6.47 K/UL — SIGNIFICANT CHANGE UP (ref 3.8–10.5)
WBC # FLD AUTO: 7.81 K/UL — SIGNIFICANT CHANGE UP (ref 3.8–10.5)
WBC # FLD AUTO: 8.14 K/UL — SIGNIFICANT CHANGE UP (ref 3.8–10.5)
WBC # FLD AUTO: 8.14 K/UL — SIGNIFICANT CHANGE UP (ref 3.8–10.5)
WBC # FLD AUTO: 8.53 K/UL — SIGNIFICANT CHANGE UP (ref 3.8–10.5)
WBC # FLD AUTO: 9.32 K/UL — SIGNIFICANT CHANGE UP (ref 3.8–10.5)
WBC UR QL: 2 /HPF — SIGNIFICANT CHANGE UP (ref 0–5)
WBC UR QL: 2 /HPF — SIGNIFICANT CHANGE UP (ref 0–5)

## 2023-01-01 PROCEDURE — 93970 EXTREMITY STUDY: CPT | Mod: 26

## 2023-01-01 PROCEDURE — 78815 PET IMAGE W/CT SKULL-THIGH: CPT | Mod: 26,PS

## 2023-01-01 PROCEDURE — 71250 CT THORAX DX C-: CPT

## 2023-01-01 PROCEDURE — 99285 EMERGENCY DEPT VISIT HI MDM: CPT

## 2023-01-01 PROCEDURE — 49083 ABD PARACENTESIS W/IMAGING: CPT

## 2023-01-01 PROCEDURE — 93306 TTE W/DOPPLER COMPLETE: CPT | Mod: 26

## 2023-01-01 PROCEDURE — 99497 ADVNCD CARE PLAN 30 MIN: CPT | Mod: 25

## 2023-01-01 PROCEDURE — 99223 1ST HOSP IP/OBS HIGH 75: CPT | Mod: GC

## 2023-01-01 PROCEDURE — 99214 OFFICE O/P EST MOD 30 MIN: CPT

## 2023-01-01 PROCEDURE — 78815 PET IMAGE W/CT SKULL-THIGH: CPT

## 2023-01-01 PROCEDURE — 99233 SBSQ HOSP IP/OBS HIGH 50: CPT | Mod: GC

## 2023-01-01 PROCEDURE — 93970 EXTREMITY STUDY: CPT

## 2023-01-01 PROCEDURE — 99215 OFFICE O/P EST HI 40 MIN: CPT

## 2023-01-01 PROCEDURE — A9592: CPT

## 2023-01-01 PROCEDURE — 99232 SBSQ HOSP IP/OBS MODERATE 35: CPT | Mod: 25

## 2023-01-01 PROCEDURE — 99222 1ST HOSP IP/OBS MODERATE 55: CPT

## 2023-01-01 PROCEDURE — 99239 HOSP IP/OBS DSCHRG MGMT >30: CPT

## 2023-01-01 PROCEDURE — 93975 VASCULAR STUDY: CPT | Mod: 26

## 2023-01-01 PROCEDURE — 71250 CT THORAX DX C-: CPT | Mod: 26

## 2023-01-01 PROCEDURE — 49083 ABD PARACENTESIS W/IMAGING: CPT | Mod: GC

## 2023-01-01 PROCEDURE — 99232 SBSQ HOSP IP/OBS MODERATE 35: CPT

## 2023-01-01 PROCEDURE — 74177 CT ABD & PELVIS W/CONTRAST: CPT | Mod: 26,MA

## 2023-01-01 RX ORDER — LOPERAMIDE HCL 2 MG
2 TABLET ORAL
Refills: 0 | DISCHARGE

## 2023-01-01 RX ORDER — PANTOPRAZOLE 40 MG/1
40 TABLET, DELAYED RELEASE ORAL DAILY
Qty: 30 | Refills: 5 | Status: ACTIVE | COMMUNITY
Start: 2023-01-01 | End: 1900-01-01

## 2023-01-01 RX ORDER — FUROSEMIDE 40 MG
40 TABLET ORAL
Refills: 0 | Status: DISCONTINUED | OUTPATIENT
Start: 2023-01-01 | End: 2023-01-01

## 2023-01-01 RX ORDER — AZITHROMYCIN 500 MG/1
1000 TABLET, FILM COATED ORAL ONCE
Refills: 0 | Status: COMPLETED | OUTPATIENT
Start: 2023-01-01 | End: 2023-01-01

## 2023-01-01 RX ORDER — PANTOPRAZOLE SODIUM 20 MG/1
TABLET, DELAYED RELEASE ORAL
Refills: 0 | Status: DISCONTINUED | OUTPATIENT
Start: 2023-01-01 | End: 2023-01-01

## 2023-01-01 RX ORDER — OXYCODONE HYDROCHLORIDE 5 MG/1
5 TABLET ORAL EVERY 6 HOURS
Refills: 0 | Status: DISCONTINUED | OUTPATIENT
Start: 2023-01-01 | End: 2023-01-01

## 2023-01-01 RX ORDER — FUROSEMIDE 40 MG
1 TABLET ORAL
Refills: 0 | DISCHARGE

## 2023-01-01 RX ORDER — INFLUENZA VIRUS VACCINE 15; 15; 15; 15 UG/.5ML; UG/.5ML; UG/.5ML; UG/.5ML
0.5 SUSPENSION INTRAMUSCULAR ONCE
Refills: 0 | Status: DISCONTINUED | OUTPATIENT
Start: 2023-01-01 | End: 2023-01-01

## 2023-01-01 RX ORDER — LANOLIN ALCOHOL/MO/W.PET/CERES
3 CREAM (GRAM) TOPICAL AT BEDTIME
Refills: 0 | Status: DISCONTINUED | OUTPATIENT
Start: 2023-01-01 | End: 2023-01-01

## 2023-01-01 RX ORDER — MORPHINE TINCTURE 1 G/100ML
10 MG/ML SOLUTION ORAL 4 TIMES DAILY
Qty: 35 | Refills: 0 | Status: ACTIVE | COMMUNITY
Start: 2023-01-01 | End: 1900-01-01

## 2023-01-01 RX ORDER — PANTOPRAZOLE SODIUM 20 MG/1
80 TABLET, DELAYED RELEASE ORAL ONCE
Refills: 0 | Status: COMPLETED | OUTPATIENT
Start: 2023-01-01 | End: 2023-01-01

## 2023-01-01 RX ORDER — EVEROLIMUS 10 MG/1
10 TABLET ORAL
Qty: 30 | Refills: 5 | Status: ACTIVE | COMMUNITY
Start: 2023-01-01 | End: 1900-01-01

## 2023-01-01 RX ORDER — POTASSIUM PHOSPHATE, MONOBASIC POTASSIUM PHOSPHATE, DIBASIC 236; 224 MG/ML; MG/ML
30 INJECTION, SOLUTION INTRAVENOUS ONCE
Refills: 0 | Status: COMPLETED | OUTPATIENT
Start: 2023-01-01 | End: 2023-01-01

## 2023-01-01 RX ORDER — OXYCODONE HYDROCHLORIDE 5 MG/1
10 TABLET ORAL EVERY 6 HOURS
Refills: 0 | Status: DISCONTINUED | OUTPATIENT
Start: 2023-01-01 | End: 2023-01-01

## 2023-01-01 RX ORDER — FUROSEMIDE 40 MG
1 TABLET ORAL
Qty: 60 | Refills: 0
Start: 2023-01-01 | End: 2023-12-15

## 2023-01-01 RX ORDER — LOPERAMIDE HCL 2 MG
4 TABLET ORAL EVERY 6 HOURS
Refills: 0 | Status: DISCONTINUED | OUTPATIENT
Start: 2023-01-01 | End: 2023-01-01

## 2023-01-01 RX ORDER — FUROSEMIDE 20 MG/1
20 TABLET ORAL DAILY
Qty: 40 | Refills: 0 | Status: ACTIVE | COMMUNITY
Start: 2023-01-01 | End: 1900-01-01

## 2023-01-01 RX ORDER — SODIUM,POTASSIUM PHOSPHATES 278-250MG
1 POWDER IN PACKET (EA) ORAL ONCE
Refills: 0 | Status: COMPLETED | OUTPATIENT
Start: 2023-01-01 | End: 2023-01-01

## 2023-01-01 RX ORDER — PANTOPRAZOLE SODIUM 20 MG/1
40 TABLET, DELAYED RELEASE ORAL EVERY 12 HOURS
Refills: 0 | Status: DISCONTINUED | OUTPATIENT
Start: 2023-01-01 | End: 2023-01-01

## 2023-01-01 RX ORDER — POTASSIUM CHLORIDE 20 MEQ
40 PACKET (EA) ORAL ONCE
Refills: 0 | Status: DISCONTINUED | OUTPATIENT
Start: 2023-01-01 | End: 2023-01-01

## 2023-01-01 RX ORDER — PANTOPRAZOLE SODIUM 20 MG/1
1 TABLET, DELAYED RELEASE ORAL
Qty: 60 | Refills: 1
Start: 2023-01-01 | End: 2024-01-14

## 2023-01-01 RX ORDER — OXYCODONE AND ACETAMINOPHEN 2.5; 325 MG/1; MG/1
2.5-325 TABLET ORAL EVERY 6 HOURS
Qty: 120 | Refills: 0 | Status: ACTIVE | COMMUNITY
Start: 2023-01-01 | End: 1900-01-01

## 2023-01-01 RX ORDER — ENOXAPARIN SODIUM 100 MG/ML
40 INJECTION SUBCUTANEOUS EVERY 24 HOURS
Refills: 0 | Status: DISCONTINUED | OUTPATIENT
Start: 2023-01-01 | End: 2023-01-01

## 2023-01-01 RX ORDER — FUROSEMIDE 40 MG
20 TABLET ORAL ONCE
Refills: 0 | Status: COMPLETED | OUTPATIENT
Start: 2023-01-01 | End: 2023-01-01

## 2023-01-01 RX ORDER — LANREOTIDE ACETATE 60 MG/.2ML
1 INJECTION SUBCUTANEOUS
Qty: 0 | Refills: 0 | DISCHARGE

## 2023-01-01 RX ORDER — ACETAMINOPHEN 500 MG
650 TABLET ORAL EVERY 6 HOURS
Refills: 0 | Status: DISCONTINUED | OUTPATIENT
Start: 2023-01-01 | End: 2023-01-01

## 2023-01-01 RX ORDER — CEFTRIAXONE 500 MG/1
2000 INJECTION, POWDER, FOR SOLUTION INTRAMUSCULAR; INTRAVENOUS ONCE
Refills: 0 | Status: COMPLETED | OUTPATIENT
Start: 2023-01-01 | End: 2023-01-01

## 2023-01-01 RX ORDER — FUROSEMIDE 40 MG
20 TABLET ORAL DAILY
Refills: 0 | Status: DISCONTINUED | OUTPATIENT
Start: 2023-01-01 | End: 2023-01-01

## 2023-01-01 RX ORDER — LOPERAMIDE HYDROCHLORIDE 2 MG/1
2 TABLET ORAL EVERY 6 HOURS
Qty: 240 | Refills: 3 | Status: ACTIVE | COMMUNITY
Start: 2022-07-29 | End: 1900-01-01

## 2023-01-01 RX ORDER — FUROSEMIDE 40 MG
40 TABLET ORAL ONCE
Refills: 0 | Status: COMPLETED | OUTPATIENT
Start: 2023-01-01 | End: 2023-01-01

## 2023-01-01 RX ORDER — DIPHENOXYLATE HYDROCHLORIDE AND ATROPINE SULFATE 2.5; .025 MG/1; MG/1
2.5-0.025 TABLET ORAL
Qty: 30 | Refills: 0 | Status: ACTIVE | COMMUNITY
Start: 2023-01-01 | End: 1900-01-01

## 2023-01-01 RX ORDER — FUROSEMIDE 40 MG
40 TABLET ORAL DAILY
Refills: 0 | Status: DISCONTINUED | OUTPATIENT
Start: 2023-01-01 | End: 2023-01-01

## 2023-01-01 RX ADMIN — Medication 650 MILLIGRAM(S): at 19:28

## 2023-01-01 RX ADMIN — Medication 40 MILLIGRAM(S): at 06:47

## 2023-01-01 RX ADMIN — PANTOPRAZOLE SODIUM 40 MILLIGRAM(S): 20 TABLET, DELAYED RELEASE ORAL at 17:43

## 2023-01-01 RX ADMIN — ENOXAPARIN SODIUM 40 MILLIGRAM(S): 100 INJECTION SUBCUTANEOUS at 18:03

## 2023-01-01 RX ADMIN — Medication 40 MILLIGRAM(S): at 07:15

## 2023-01-01 RX ADMIN — Medication 650 MILLIGRAM(S): at 23:21

## 2023-01-01 RX ADMIN — PANTOPRAZOLE SODIUM 40 MILLIGRAM(S): 20 TABLET, DELAYED RELEASE ORAL at 18:12

## 2023-01-01 RX ADMIN — Medication 40 MILLIGRAM(S): at 13:26

## 2023-01-01 RX ADMIN — Medication 40 MILLIGRAM(S): at 05:36

## 2023-01-01 RX ADMIN — PANTOPRAZOLE SODIUM 40 MILLIGRAM(S): 20 TABLET, DELAYED RELEASE ORAL at 08:05

## 2023-01-01 RX ADMIN — Medication 40 MILLIGRAM(S): at 08:04

## 2023-01-01 RX ADMIN — Medication 20 MILLIGRAM(S): at 14:19

## 2023-01-01 RX ADMIN — CEFTRIAXONE 100 MILLIGRAM(S): 500 INJECTION, POWDER, FOR SOLUTION INTRAMUSCULAR; INTRAVENOUS at 23:14

## 2023-01-01 RX ADMIN — Medication 1 PACKET(S): at 09:55

## 2023-01-01 RX ADMIN — Medication 40 MILLIGRAM(S): at 14:08

## 2023-01-01 RX ADMIN — ENOXAPARIN SODIUM 40 MILLIGRAM(S): 100 INJECTION SUBCUTANEOUS at 06:46

## 2023-01-01 RX ADMIN — Medication 1 PACKET(S): at 10:39

## 2023-01-01 RX ADMIN — Medication 40 MILLIGRAM(S): at 13:55

## 2023-01-01 RX ADMIN — PANTOPRAZOLE SODIUM 40 MILLIGRAM(S): 20 TABLET, DELAYED RELEASE ORAL at 06:47

## 2023-01-01 RX ADMIN — POTASSIUM PHOSPHATE, MONOBASIC POTASSIUM PHOSPHATE, DIBASIC 83.33 MILLIMOLE(S): 236; 224 INJECTION, SOLUTION INTRAVENOUS at 11:38

## 2023-01-01 RX ADMIN — OXYCODONE HYDROCHLORIDE 5 MILLIGRAM(S): 5 TABLET ORAL at 14:32

## 2023-01-01 RX ADMIN — Medication 40 MILLIGRAM(S): at 05:41

## 2023-01-01 RX ADMIN — AZITHROMYCIN 1000 MILLIGRAM(S): 500 TABLET, FILM COATED ORAL at 19:29

## 2023-01-01 RX ADMIN — Medication 40 MILLIGRAM(S): at 05:30

## 2023-01-01 RX ADMIN — Medication 20 MILLIGRAM(S): at 05:29

## 2023-01-01 RX ADMIN — Medication 40 MILLIGRAM(S): at 13:28

## 2023-01-01 RX ADMIN — PANTOPRAZOLE SODIUM 40 MILLIGRAM(S): 20 TABLET, DELAYED RELEASE ORAL at 05:41

## 2023-01-01 RX ADMIN — PANTOPRAZOLE SODIUM 40 MILLIGRAM(S): 20 TABLET, DELAYED RELEASE ORAL at 17:22

## 2023-01-01 RX ADMIN — Medication 40 MILLIGRAM(S): at 14:17

## 2023-01-01 RX ADMIN — OXYCODONE HYDROCHLORIDE 5 MILLIGRAM(S): 5 TABLET ORAL at 15:30

## 2023-01-01 RX ADMIN — PANTOPRAZOLE SODIUM 40 MILLIGRAM(S): 20 TABLET, DELAYED RELEASE ORAL at 17:31

## 2023-01-01 RX ADMIN — Medication 650 MILLIGRAM(S): at 00:10

## 2023-01-01 RX ADMIN — PANTOPRAZOLE SODIUM 40 MILLIGRAM(S): 20 TABLET, DELAYED RELEASE ORAL at 07:15

## 2023-01-01 RX ADMIN — Medication 20 MILLIGRAM(S): at 14:23

## 2023-01-01 RX ADMIN — Medication 650 MILLIGRAM(S): at 20:23

## 2023-01-01 RX ADMIN — OXYCODONE HYDROCHLORIDE 5 MILLIGRAM(S): 5 TABLET ORAL at 16:57

## 2023-01-01 RX ADMIN — PANTOPRAZOLE SODIUM 80 MILLIGRAM(S): 20 TABLET, DELAYED RELEASE ORAL at 11:39

## 2023-02-05 NOTE — PHYSICAL EXAM
[Thin] : thin [Normal] : affect appropriate [de-identified] : anicteric  [de-identified] : supple, non tender, FROM  [de-identified] : normoactive bowel sounds, no tenderness on palpation

## 2023-02-05 NOTE — HISTORY OF PRESENT ILLNESS
[Disease: _____________________] : Disease: [unfilled] [T: ___] : T[unfilled] [N: ___] : N[unfilled] [M: ___] : M[unfilled] [AJCC Stage: ____] : AJCC Stage: [unfilled] [N/A] : Currently not applicable [de-identified] : Ms. Franklin was a new set of health still December of 2013 when she noticed some vague abdominal pain at which time she presented to the emergency room was found to have a mesenteric mass measuring 4.2 cm in greatest dimension by the aortic bifurcation. She eventually underwent a laparoscopic biopsy by Dr. Dennis Singh is found to be consistent with a neuroendocrine tumor. Patient was eventually seen by a gastroenterologist Dr. Adi Patel who performed an upper endoscopy and colonoscopy that was unremarkable and was socially followed by a capsule study that revealed a mass in the jejunum patient was subsequently taken to the OR by Dr. 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  since there is no evidence of disease at that time and no evidence of metastatic disease after discussion with the patient and her presentation at tumor patient was placed on surveillance therapy off treatment.\par \par In November 2017 found to have metastatic disease and started on lanreotide\par \par July 2019 CT c/a/p showed liver metastases, unchanged to slightly increased in size compared with the prior study. 4 mm left lower lobe groundglass pulmonary nodule, unchanged dating back to the 2014 exam, most consistent with a benign etiology. \par \par In the meantime she was evaluated by Dr. Lilly at [facility] for liver transplantation. The patient has been noncompliant with her lanreotide treatment. The last dose was in July, 2019, missed Meera dose. Amairani comes in for follow up while on Lanreotide and feels well overall.  She denies any abdominal pain, diarrhea. She continues to work without issues. Patient still has not had consultation with IR to discuss liver directed therapy (she requested). \par \par 1/17/2020 she feels fine, works without any issues. She denies any abdominal pain, diarrhea.  She was seen by Dr. Armenta for liver-directed therapy, has not decided when to receive the treatment. \par \par 4/24/2020 she feels fine, works without any issues. She denies any abdominal pain, diarrhea.  She was seen by Dr. Armenta for liver-directed therapy, has not decided when to receive the treatment. \par \par 6/26/2020 : Ms Franklin  joined telehealth visit today. She received somatuline on 6/22/2020. No labs were obtained that day. She describes her "is moving slowly and her food seems to me slow slowly". She becker snot have NV and does not endorse pain. \par She feels well otherwise, working.\par \par 7/11/2020 CT c/a/p showed Liver metastases, overall increased in size and number since 7/13/2019. A 0.4 cm left lower lobe groundglass nodule is unchanged.. \par \par 9/4/2020 she reports feeling overall fine, eats well, sometimes epigastric discomfort, no pain, no weight loss. \par \par 12/30/2020 she had sore throat 10 days ago, had positive COVID test on 12/26 because of her coworker positive, otherwise feels overall fine, has good appetite, denies fever, chest pain, cough, and shortness of breath. \par \par 2/24/2021 she feels overall fine. eats well, denies nausea, vomiting, abdominal pain and diarrhea. \par \par 3/14/21 CT c/a/p showed Bilobar hepatic metastatic disease with interval progression compared with prior imaging. Lesions demonstrate enhancement on arterial phase imaging\par Hepatic dome 2.8 x 2.8 cm, previously 2.2 x 1.8 cm. Segment 7 lesion 4.2 x 4.1 cm, previously 1.8 x 1.8 cm. Segment 5 lesion 3.2 x 3.2 cm, previously 2.8 x 2.5 cm. Segment 6 lesion 2.4 x 2.4 cm, previously 1.9 x 1.8 cm. He started having RUQ mild pain referring to her right shoulder, has good appetite, denies abdominal pain and diarrhea.  \par \par 9/21/21 CT abdomen and pelvis showed Right hepatic dome (3:12) 4.0 x 3.1 cm, previously 3.7 x 3.1 cm. Simple in appearance. Segment 7 lesion (3:17) 2.7 x 2.6 cm, previously 3.5 x 3.2 cm. Similar in appearance. Segment 5 lesion likely extending into segment IVb (3:37) 3.5 x 3.4 cm, previously 3.2 x 3.2 cm. Appearance and enhancement is unchanged. \par Segment 6 lesion (3:35) 4.9 x 4.7 cm, previously 4.1 x 3.7 cm. Slightly increased central low density likely reflecting treatment change.\par \par 10/11/21 s/p Y90 SIRT with Dr. Armenta. SHe reports RUQ pain when coughing, facial flushing sometimes, gasping for breath. Her weakness has been better now since local treatment. Shortly after local treatment, 3 to 4 times of watery diarrhea. She has once formed stool now.  \par    \par 11/5/21: Pt returns with concerns for L. supraclavicular mass enlarging. Mass became very prominent and tender. It has since gotten smaller over last 2 weeks. Denies redness and fever. \par \par 12/4/21 CT c/a/p showed Bilobar hepatic neoplasm. Interval increase in size and number of left hepatic lobe lesions. Most of right hepatic lobe disease is not significantly changed in size with exception of segment 6 lesions which appear to have coalesced with concern for progression.\par \par 12/17/21 she reports worsening RUQ pain, sometimes radiating to the shoulder and back, loss of appetite and weight, 3 to 4 times of watery diarrhea. \par \par 3/12/22 CT c/a/p showed Interval progression of disease in the liver with increase in size and number of lesions. No evidence of metastatic disease in the chest.\par \par 3/29/22 Dotatate PET CT showed 1. Multiple intensely DOTATATE-avid bilobar hepatic metastases. 2. DOTATATE-avid left supraclavicular, mediastinal, and upper abdominal lymph node metastases. 3. Focus of increased radiotracer activity in the spleen is compatible with metastasis.\par \par 4/4/22 report RUQ intermittent pain, 5/10, sometimes with diarrhea 4 to 5 times, denies abdominal cramps and nausea and vomiting. States generalized fatigue and loss of energy. \par \par 7/1/22 recent ED visit for melena and fever, however, her melena was resolved in the ED without further work up and treated with zosyn one day with all fever work ups negative. She was discharged in the ED. 6/13 CT abdomen and pelvis showed Progression of hepatic metastatic disease when compared with previous study of 3/12/2022.\par Asymmetric gallbladder wall thickening/enhancement, including subtle increased prominence of the common bile duct may be infectious/inflammatory. New nonspecific 4 mm right pulmonary nodule, concerning for metastasis.\par reports loss of weight although eats well with intermittent diarrhea, sometimes loose stool, sometimes watery. Given the shortage or PRRT, she finally received treatment on June 30. \par \par 7/29/22: Patient has been struggling with worsening diarrhea for the past month or so. She feels like it is "raining inside of her stomach" and everything she eats runs right through her. She is not able to gain weight because of the diarrhea. She feels weak all of the time. Typically she has about 4-5 loose bowel movements a day, but on a bad day she can have about 10-12 and she will be up all night having bowel movements. This is extremely distressing to her. She has been taking imodium on and off, but not consistently. At most she was taking it 3 times a day. She reports having taken it this morning because she had a particularly bad night last night. She denies very foul smelling stool and blood in stool. In regards to her weakness, she actually feels a bit better after getting her first dose of PRRT. However, she still gets tired very quickly and is not able to do much more than go to work and get in to bed at about 6-7pm. She is hopeful that she will see improvement in her diarrhea with subsequent treatments.\par \par 10/21/22: Patient received her third lutetium dotatate injection yesterday and is feeling tired and weak today. However, in the recent weeks patient has been feeling better and stronger. She has more energy. She recounts that she used to not go into the basement at work because it would be difficult for her to get back up, now she is able to tolerate climbing these stairs. She has a good appetite, but still is struggling to gain weight. Her diarrhea has also improved. She has 3-4 bowel movements a day, normal consistency, she has uses loperamide infrequently.  [de-identified] : chromogranin and serotonin [FreeTextEntry1] : Lanreotide, PRRT lutetium dotatate [de-identified] : \par \par 1/20/23: s/p 4 doses of  Jyotsna 177  dotatate, last given on 12/15/22, imaging from that radionucleotide injection showed overall similar distribution to prior 3 studies, but there is one area in the lower portion of the R lobe of the liver that has significantly decreased in intensity from prior exams. \par Patient is feeling much better than previous, she has more energy. In the past she was only able to go to work and go to sleep, now she feels she can do more things with her day. Her appetite is good and she has gained a few pounds. She is not having diarrhea. She reports having bowel movement once a day. In the past month she thinks she has only taken the imodium once. Patient reports hot flashes, her last menstrual period was age 49, approx 3 years ago.

## 2023-02-05 NOTE — ASSESSMENT
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 Patient has history of low grade NET from primary small intestine s/p resection [id number] in 2014, metastasis to the liver, started on monthly on lanreotide injections in 2017. Her diagnosis has been reviewed with the patient and the importance of compliance with treatment has been stressed given that she has metastatic disease. CT scans in July 2020, March 2021 showed progression of her liver metastasis. She was referred to IR for Y90 treatment, however she declined until August 2021 when she ultimately received Y90. Thereafter scans continued to show progression of disease in her liver. She had PETCT dotatate scan in March 2022 which showed extensive disease and was found to be a candidate for PRRT 177 Jyotsna dotatate. She was referred to Dr. Haile at [facility]. AEs of this nucleotide include but are not limited to myelosuppression, secondary MDS and leukemia, renal, and hepatic toxicity. Alternative to PRRT is everolimus 5 mg daily. Her most recent scans in June 2022 show progression of her liver disease as well as lesions concerning for lung metastasis. Patient decided to proceed with PRRT 177 Jy[place]na dotatate and received her first dose on June 30, 2022, second dose 8/25, third on 10/20, fourth 12/15/22. She is continued on monthly lanreotide injections.\par \par Plan\par Metastatic Small Bowel Neuroendocrine Tumor\par - continue monthly lanreotide injections; patient to receive today\par - completed 4 doses PRRT 177 Jyotsna dotatate at [facility] [id number] last given on 12/15/22 \par - imaging from 12/15/22, radionucleotide injection showed overall similar distribution to prior 3 studies, but there is one area in the lower portion of the R lobe of the liver that has significantly decreased in intensity from prior exams. \par -  plan to repeat dotatate PET CT 3 months after completion of 177 Jyotsna dotatate, tentatively March, [place] today \par - follow up lab work from today, CBC, CMP, chromogranin A and serotonin\par - continue loperamide as needed \par \par Instructed to contact our office with any new/worsening symptoms.\par Patient educated regarding plan of care, all questions/concerns addressed to the best of my abilities and patient's apparent satisfaction.\par RTC March, 1 week after imaging \par patient reports she will call to make her own injection and follow up appts \par

## 2023-03-29 NOTE — PHYSICAL EXAM
[Thin] : thin [Normal] : affect appropriate [Fully active, able to carry on all pre-disease performance without restriction] : Status 0 - Fully active, able to carry on all pre-disease performance without restriction [de-identified] : anicteric  [de-identified] : normoactive bowel sounds, no tenderness on palpation

## 2023-03-29 NOTE — ASSESSMENT
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 Patient has history of low grade NET from primary small intestine s/p resection [id number] in 2014, metastasis to the liver, started on monthly on lanreotide injections in 2017. Her diagnosis has been reviewed with the patient and the importance of compliance with treatment has been stressed given that she has metastatic disease. CT scans in July 2020, March 2021 showed progression of her liver metastasis. She was referred to IR for Y90 treatment, however she declined until August 2021 when she ultimately received Y90. Thereafter scans continued to show progression of disease in her liver. She had PETCT dotatate scan in March 2022 which showed extensive disease and was found to be a candidate for PRRT 177 Jyotsna dotatate. She was referred to Dr. Haile at [facility], . AEs of this nucleotide include but are not limited to myelosuppression, secondary MDS and leukemia, renal, and hepatic toxicity. Alternative to PRRT is everolimus 5 mg daily. Her most recent scans in June 2022 show progression of her liver disease as well as lesions concerning for lung metastasis. Patient decided to proceed with PRRT 177 Jyotsna dotatate and received her first dose on June 30, 2022, second dose 8/25, third on 10/20, fourth 12/15/22. She is continued on monthly lanreotide injections. March Dotatate PET continues to show the partial response in chest, liver and retroperitoneum and two spots in the liver with slight increase in size. \par \par Plan\par Metastatic Small Bowel Neuroendocrine Tumor\par - continue monthly lanreotide injections; patient to receive today\par - completed 4 doses PRRT 177 Jyotsna dotatate at [facility] [id number] last given on 12/15/22 \par - imaging from 12/15/22, radionucleotide injection showed overall similar distribution to prior 3 studies, but there is one area in the lower portion of the R lobe of the liver that has significantly decreased in intensity from prior exams. \par -  plan to repeat dotatate PET CT 3 months after completion of 177 Jyotsna dotatate in June\par - follow up lab work from today, CBC, CMP, chromogranin A and serotonin\par - continue loperamide as needed \par \par Instructed to contact our office with any new/worsening symptoms.\par Patient educated regarding plan of care, all questions/concerns addressed to the best of my abilities and patient's apparent satisfaction.\par RTC  in one month \par

## 2023-03-29 NOTE — HISTORY OF PRESENT ILLNESS
[Disease: _____________________] : Disease: [unfilled] [T: ___] : T[unfilled] [N: ___] : N[unfilled] [M: ___] : M[unfilled] [AJCC Stage: ____] : AJCC Stage: [unfilled] [N/A] : Currently not applicable [de-identified] : Ms. Franklin was a new set of health still December of 2013 when she noticed some vague abdominal pain at which time she presented to the emergency room was found to have a mesenteric mass measuring 4.2 cm in greatest dimension by the aortic bifurcation. She eventually underwent a laparoscopic biopsy by Dr. Dennis Singh is found to be consistent with a neuroendocrine tumor. Patient was eventually seen by a gastroenterologist Dr. Adi Patel who performed an upper endoscopy and colonoscopy that was unremarkable and was socially followed by a capsule study that revealed a mass in the jejunum patient was subsequently taken to the OR by Dr. 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  since there is no evidence of disease at that time and no evidence of metastatic disease after discussion with the patient and her presentation at tumor patient was placed on surveillance therapy off treatment.\par \par In November 2017 found to have metastatic disease and started on lanreotide\par \par July 2019 CT c/a/p showed liver metastases, unchanged to slightly increased in size compared with the prior study. 4 mm left lower lobe groundglass pulmonary nodule, unchanged dating back to the 2014 exam, most consistent with a benign etiology. \par \par In the meantime she was evaluated by Dr. Lilly at [place] for liver transplantation. The patient has been noncompliant with her lanreotide treatment. The last dose was in July, 2019, missed Meera dose. Amairani comes in for follow up while on Lanreotide and feels well overall.  She denies any abdominal pain, diarrhea. She continues to work without issues. Patient still has not had consultation with IR to discuss liver directed therapy (she requested). \par \par 1/17/2020 she feels fine, works without any issues. She denies any abdominal pain, diarrhea.  She was seen by Dr. Armenta for liver-directed therapy, has not decided when to receive the treatment. \par \par 4/24/2020 she feels fine, works without any issues. She denies any abdominal pain, diarrhea.  She was seen by Dr. Armenta for liver-directed therapy, has not decided when to receive the treatment. \par \par 6/26/2020 : Ms Franklin  joined telehealth visit today. She received somatuline on 6/22/2020. No labs were obtained that day. She describes her "is moving slowly and her food seems to me slow slowly". She becker snot have NV and does not endorse pain. \par She feels well otherwise, working.\par \par 7/11/2020 CT c/a/p showed Liver metastases, overall increased in size and number since 7/13/2019. A 0.4 cm left lower lobe groundglass nodule is unchanged.. \par \par 9/4/2020 she reports feeling overall fine, eats well, sometimes epigastric discomfort, no pain, no weight loss. \par \par 12/30/2020 she had sore throat 10 days ago, had positive COVID test on 12/26 because of her coworker positive, otherwise feels overall fine, has good appetite, denies fever, chest pain, cough, and shortness of breath. \par \par 2/24/2021 she feels overall fine. eats well, denies nausea, vomiting, abdominal pain and diarrhea. \par \par 3/14/21 CT c/a/p showed Bilobar hepatic metastatic disease with interval progression compared with prior imaging. Lesions demonstrate enhancement on arterial phase imaging\par Hepatic dome 2.8 x 2.8 cm, previously 2.2 x 1.8 cm. Segment 7 lesion 4.2 x 4.1 cm, previously 1.8 x 1.8 cm. Segment 5 lesion 3.2 x 3.2 cm, previously 2.8 x 2.5 cm. Segment 6 lesion 2.4 x 2.4 cm, previously 1.9 x 1.8 cm. He started having RUQ mild pain referring to her right shoulder, has good appetite, denies abdominal pain and diarrhea.  \par \par 9/21/21 CT abdomen and pelvis showed Right hepatic dome (3:12) 4.0 x 3.1 cm, previously 3.7 x 3.1 cm. Simple in appearance. Segment 7 lesion (3:17) 2.7 x 2.6 cm, previously 3.5 x 3.2 cm. Similar in appearance. Segment 5 lesion likely extending into segment IVb (3:37) 3.5 x 3.4 cm, previously 3.2 x 3.2 cm. Appearance and enhancement is unchanged. \par Segment 6 lesion (3:35) 4.9 x 4.7 cm, previously 4.1 x 3.7 cm. Slightly increased central low density likely reflecting treatment change.\par \par 10/11/21 s/p Y90 SIRT with Dr. Armenta. SHe reports RUQ pain when coughing, facial flushing sometimes, gasping for breath. Her weakness has been better now since local treatment. Shortly after local treatment, 3 to 4 times of watery diarrhea. She has once formed stool now.  \par    \par 11/5/21: Pt returns with concerns for L. supraclavicular mass enlarging. Mass became very prominent and tender. It has since gotten smaller over last 2 weeks. Denies redness and fever. \par \par 12/4/21 CT c/a/p showed Bilobar hepatic neoplasm. Interval increase in size and number of left hepatic lobe lesions. Most of right hepatic lobe disease is not significantly changed in size with exception of segment 6 lesions which appear to have coalesced with concern for progression.\par \par 12/17/21 she reports worsening RUQ pain, sometimes radiating to the shoulder and back, loss of appetite and weight, 3 to 4 times of watery diarrhea. \par \par 3/12/22 CT c/a/p showed Interval progression of disease in the liver with increase in size and number of lesions. No evidence of metastatic disease in the chest.\par \par 3/29/22 Dotatate PET CT showed 1. Multiple intensely DOTATATE-avid bilobar hepatic metastases. 2. DOTATATE-avid left supraclavicular, mediastinal, and upper abdominal lymph node metastases. 3. Focus of increased radiotracer activity in the spleen is compatible with metastasis.\par \par 4/4/22 report RUQ intermittent pain, 5/10, sometimes with diarrhea 4 to 5 times, denies abdominal cramps and nausea and vomiting. States generalized fatigue and loss of energy. \par \par 7/1/22 recent ED visit for melena and fever, however, her melena was resolved in the ED without further work up and treated with zosyn one day with all fever work ups negative. She was discharged in the ED. 6/13 CT abdomen and pelvis showed Progression of hepatic metastatic disease when compared with previous study of 3/12/2022.\par Asymmetric gallbladder wall thickening/enhancement, including subtle increased prominence of the common bile duct may be infectious/inflammatory. New nonspecific 4 mm right pulmonary nodule, concerning for metastasis.\par reports loss of weight although eats well with intermittent diarrhea, sometimes loose stool, sometimes watery. Given the shortage or PRRT, she finally received treatment on June 30. \par \par 7/29/22: Patient has been struggling with worsening diarrhea for the past month or so. She feels like it is "raining inside of her stomach" and everything she eats runs right through her. She is not able to gain weight because of the diarrhea. She feels weak all of the time. Typically she has about 4-5 loose bowel movements a day, but on a bad day she can have about 10-12 and she will be up all night having bowel movements. This is extremely distressing to her. She has been taking imodium on and off, but not consistently. At most she was taking it 3 times a day. She reports having taken it this morning because she had a particularly bad night last night. She denies very foul smelling stool and blood in stool. In regards to her weakness, she actually feels a bit better after getting her first dose of PRRT. However, she still gets tired very quickly and is not able to do much more than go to work and get in to bed at about 6-7pm. She is hopeful that she will see improvement in her diarrhea with subsequent treatments.\par \par 10/21/22: Patient received her third lutetium dotatate injection yesterday and is feeling tired and weak today. However, in the recent weeks patient has been feeling better and stronger. She has more energy. She recounts that she used to not go into the basement at work because it would be difficult for her to get back up, now she is able to tolerate climbing these stairs. She has a good appetite, but still is struggling to gain weight. Her diarrhea has also improved. She has 3-4 bowel movements a day, normal consistency, she has uses loperamide infrequently.  [de-identified] : chromogranin and serotonin [FreeTextEntry1] : Lanreotide, PRRT lutetium dotatate [de-identified] : \par \par 1/20/23: s/p 4 doses of  Jyotsna 177  dotatate, last given on 12/15/22, imaging from that radionucleotide injection showed overall similar distribution to prior 3 studies, but there is one area in the lower portion of the R lobe of the liver that has significantly decreased in intensity from prior exams. \par Patient is feeling much better than previous, she has more energy. In the past she was only able to go to work and go to sleep, now she feels she can do more things with her day. Her appetite is good and she has gained a few pounds. She is not having diarrhea. She reports having bowel movement once a day. In the past month she thinks she has only taken the imodium once. Patient reports hot flashes, her last menstrual period was age 49, approx 3 years ago. \par \par 3/16/23 Dotatate PET CT showed 1. Findings consistent with a partial response to therapy in chest and abdominal lymph nodes and spleen as well as a possible bowel lesion.\par 2. Redemonstration of innumerable liver lesions with mixed changes with improvement of some lesions and worsening of others \par \par 3/29/23 reports feeling more energetic. States eating well and gaining weight. Denies abdominal cramps and diarrhea.

## 2023-03-29 NOTE — REVIEW OF SYSTEMS
[Hot Flashes] : hot flashes [Fever] : no fever [Chills] : no chills [Fatigue] : no fatigue [Dysphagia] : no dysphagia [Odynophagia] : no odynophagia [Chest Pain] : no chest pain [Palpitations] : no palpitations [Lower Ext Edema] : no lower extremity edema [Shortness Of Breath] : no shortness of breath [Cough] : no cough [Abdominal Pain] : no abdominal pain [Constipation] : no constipation [Diarrhea] : no diarrhea [Dysuria] : no dysuria [Joint Pain] : no joint pain [Muscle Pain] : no muscle pain [Skin Rash] : no skin rash [Dizziness] : no dizziness [Difficulty Walking] : no difficulty walking [Easy Bleeding] : no tendency for easy bleeding [Easy Bruising] : no tendency for easy bruising

## 2023-06-20 NOTE — REVIEW OF SYSTEMS
[Fatigue] : fatigue [Abdominal Pain] : abdominal pain [Joint Pain] : joint pain [Fever] : no fever [Chills] : no chills [Dysphagia] : no dysphagia [Chest Pain] : no chest pain [Palpitations] : no palpitations [Lower Ext Edema] : no lower extremity edema [Shortness Of Breath] : no shortness of breath [Cough] : no cough [Vomiting] : no vomiting [Constipation] : no constipation [Dysuria] : no dysuria [Muscle Pain] : no muscle pain [Skin Rash] : no skin rash [Dizziness] : no dizziness [Difficulty Walking] : no difficulty walking [Easy Bleeding] : no tendency for easy bleeding [Easy Bruising] : no tendency for easy bruising

## 2023-06-20 NOTE — ASSESSMENT
[FreeTextEntry1] : \par Amairani Fraknlin is a 52 year old female with metastatic NET. Patient has history of low grade NET from primary small intestine s/p resection [id number] in 2014, metastasis to the liver, started on monthly on lanreotide injections in 2017. Her diagnosis has been reviewed with the patient and the importance of compliance with treatment has been stressed given that she has metastatic disease. CT scans in July 2020, March 2021 showed progression of her liver metastasis. She was referred to IR for Y90 treatment, however she declined until August 2021 when she ultimately received Y90. Thereafter scans continued to show progression of disease in her liver. She had PETCT dotatate scan in March 2022 which showed extensive disease and was found to be a candidate for PRRT 177 Jyotsna dotatate. She was referred to Dr. Haile at [facility], . AEs of this nucleotide include but are not limited to myelosuppression, secondary MDS and leukemia, renal, and hepatic toxicity. Alternative to PRRT is everolimus 5 mg daily. Her most recent scans in June 2022 show progression of her liver disease as well as lesions concerning for lung metastasis. Patient decided to proceed with PRRT 177 Jyotsna dotatate and received her first dose on June 30, 2022, second dose 8/25, third on 10/20, fourth 12/15/22. She is continued on monthly lanreotide injections. March Dotatate PET continues to show the partial response in chest, liver and retroperitoneum and two spots in the liver with slight increase in size. \par \par Plan\par Metastatic Small Bowel Neuroendocrine Tumor\par - continue monthly lanreotide injections; patient to receive today\par - completed 4 doses PRRT 177 Jyotsna dotatate at [facility] [id number] last given on 12/15/22 \par - March Dotatate 2023 PET continues to show the partial response in chest, liver and retroperitoneum and two spots in the liver with slight increase in size, plan to repeat dotatate PET CT 3 in June, ordered today \par - follow up lab work from today, CBC, CMP, chromogranin A and serotonin\par \par Instructed to contact our office with any new/worsening symptoms.\par Patient educated regarding plan of care, all questions/concerns addressed to the best of my abilities and patient's apparent satisfaction.

## 2023-06-20 NOTE — PHYSICAL EXAM
[Fully active, able to carry on all pre-disease performance without restriction] : Status 0 - Fully active, able to carry on all pre-disease performance without restriction [Thin] : thin [Normal] : normal spine exam without palpable tenderness, no kyphosis or scoliosis [de-identified] : anicteric  [de-identified] : supple, non tender  [de-identified] : normoactive bowel sounds, no tenderness on palpation, hepatomegaly

## 2023-06-20 NOTE — HISTORY OF PRESENT ILLNESS
[Disease: _____________________] : Disease: [unfilled] [T: ___] : T[unfilled] [N: ___] : N[unfilled] [M: ___] : M[unfilled] [AJCC Stage: ____] : AJCC Stage: [unfilled] [N/A] : Currently not applicable [de-identified] : Ms. Franklin was a new set of health still December of 2013 when she noticed some vague abdominal pain at which time she presented to the emergency room was found to have a mesenteric mass measuring 4.2 cm in greatest dimension by the aortic bifurcation. She eventually underwent a laparoscopic biopsy by Dr. Dennis Singh is found to be consistent with a neuroendocrine tumor. Patient was eventually seen by a gastroenterologist Dr. Adi Patel who performed an upper endoscopy and colonoscopy that was unremarkable and was socially followed by a capsule study that revealed a mass in the jejunum patient was subsequently taken to the OR by Dr. 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  since there is no evidence of disease at that time and no evidence of metastatic disease after discussion with the patient and her presentation at tumor patient was placed on surveillance therapy off treatment.\par \par In November 2017 found to have metastatic disease and started on lanreotide\par \par July 2019 CT c/a/p showed liver metastases, unchanged to slightly increased in size compared with the prior study. 4 mm left lower lobe groundglass pulmonary nodule, unchanged dating back to the 2014 exam, most consistent with a benign etiology. \par \par In the meantime she was evaluated by Dr. Lilly at [place] for liver transplantation. The patient has been noncompliant with her lanreotide treatment. The last dose was in July, 2019, missed Meera dose. Amairani comes in for follow up while on Lanreotide and feels well overall.  She denies any abdominal pain, diarrhea. She continues to work without issues. Patient still has not had consultation with IR to discuss liver directed therapy (she requested). \par \par 1/17/2020 she feels fine, works without any issues. She denies any abdominal pain, diarrhea.  She was seen by Dr. Armenta for liver-directed therapy, has not decided when to receive the treatment. \par \par 4/24/2020 she feels fine, works without any issues. She denies any abdominal pain, diarrhea.  She was seen by Dr. Armenta for liver-directed therapy, has not decided when to receive the treatment. \par \par 6/26/2020 : Ms Franklin  joined telehealth visit today. She received somatuline on 6/22/2020. No labs were obtained that day. She describes her "is moving slowly and her food seems to me slow slowly". She becker snot have NV and does not endorse pain. \par She feels well otherwise, working.\par \par 7/11/2020 CT c/a/p showed Liver metastases, overall increased in size and number since 7/13/2019. A 0.4 cm left lower lobe groundglass nodule is unchanged.. \par \par 9/4/2020 she reports feeling overall fine, eats well, sometimes epigastric discomfort, no pain, no weight loss. \par \par 12/30/2020 she had sore throat 10 days ago, had positive COVID test on 12/26 because of her coworker positive, otherwise feels overall fine, has good appetite, denies fever, chest pain, cough, and shortness of breath. \par \par 2/24/2021 she feels overall fine. eats well, denies nausea, vomiting, abdominal pain and diarrhea. \par \par 3/14/21 CT c/a/p showed Bilobar hepatic metastatic disease with interval progression compared with prior imaging. Lesions demonstrate enhancement on arterial phase imaging\par Hepatic dome 2.8 x 2.8 cm, previously 2.2 x 1.8 cm. Segment 7 lesion 4.2 x 4.1 cm, previously 1.8 x 1.8 cm. Segment 5 lesion 3.2 x 3.2 cm, previously 2.8 x 2.5 cm. Segment 6 lesion 2.4 x 2.4 cm, previously 1.9 x 1.8 cm. He started having RUQ mild pain referring to her right shoulder, has good appetite, denies abdominal pain and diarrhea.  \par \par 9/21/21 CT abdomen and pelvis showed Right hepatic dome (3:12) 4.0 x 3.1 cm, previously 3.7 x 3.1 cm. Simple in appearance. Segment 7 lesion (3:17) 2.7 x 2.6 cm, previously 3.5 x 3.2 cm. Similar in appearance. Segment 5 lesion likely extending into segment IVb (3:37) 3.5 x 3.4 cm, previously 3.2 x 3.2 cm. Appearance and enhancement is unchanged. \par Segment 6 lesion (3:35) 4.9 x 4.7 cm, previously 4.1 x 3.7 cm. Slightly increased central low density likely reflecting treatment change.\par \par 10/11/21 s/p Y90 SIRT with Dr. Armenta. SHe reports RUQ pain when coughing, facial flushing sometimes, gasping for breath. Her weakness has been better now since local treatment. Shortly after local treatment, 3 to 4 times of watery diarrhea. She has once formed stool now.  \par    \par 11/5/21: Pt returns with concerns for L. supraclavicular mass enlarging. Mass became very prominent and tender. It has since gotten smaller over last 2 weeks. Denies redness and fever. \par \par 12/4/21 CT c/a/p showed Bilobar hepatic neoplasm. Interval increase in size and number of left hepatic lobe lesions. Most of right hepatic lobe disease is not significantly changed in size with exception of segment 6 lesions which appear to have coalesced with concern for progression.\par \par 12/17/21 she reports worsening RUQ pain, sometimes radiating to the shoulder and back, loss of appetite and weight, 3 to 4 times of watery diarrhea. \par \par 3/12/22 CT c/a/p showed Interval progression of disease in the liver with increase in size and number of lesions. No evidence of metastatic disease in the chest.\par \par 3/29/22 Dotatate PET CT showed 1. Multiple intensely DOTATATE-avid bilobar hepatic metastases. 2. DOTATATE-avid left supraclavicular, mediastinal, and upper abdominal lymph node metastases. 3. Focus of increased radiotracer activity in the spleen is compatible with metastasis.\par \par 4/4/22 report RUQ intermittent pain, 5/10, sometimes with diarrhea 4 to 5 times, denies abdominal cramps and nausea and vomiting. States generalized fatigue and loss of energy. \par \par 7/1/22 recent ED visit for melena and fever, however, her melena was resolved in the ED without further work up and treated with zosyn one day with all fever work ups negative. She was discharged in the ED. 6/13 CT abdomen and pelvis showed Progression of hepatic metastatic disease when compared with previous study of 3/12/2022.\par Asymmetric gallbladder wall thickening/enhancement, including subtle increased prominence of the common bile duct may be infectious/inflammatory. New nonspecific 4 mm right pulmonary nodule, concerning for metastasis.\par reports loss of weight although eats well with intermittent diarrhea, sometimes loose stool, sometimes watery. Given the shortage or PRRT, she finally received treatment on June 30. \par \par 7/29/22: Patient has been struggling with worsening diarrhea for the past month or so. She feels like it is "raining inside of her stomach" and everything she eats runs right through her. She is not able to gain weight because of the diarrhea. She feels weak all of the time. Typically she has about 4-5 loose bowel movements a day, but on a bad day she can have about 10-12 and she will be up all night having bowel movements. This is extremely distressing to her. She has been taking imodium on and off, but not consistently. At most she was taking it 3 times a day. She reports having taken it this morning because she had a particularly bad night last night. She denies very foul smelling stool and blood in stool. In regards to her weakness, she actually feels a bit better after getting her first dose of PRRT. However, she still gets tired very quickly and is not able to do much more than go to work and get in to bed at about 6-7pm. She is hopeful that she will see improvement in her diarrhea with subsequent treatments.\par \par 10/21/22: Patient received her third lutetium dotatate injection yesterday and is feeling tired and weak today. However, in the recent weeks patient has been feeling better and stronger. She has more energy. She recounts that she used to not go into the basement at work because it would be difficult for her to get back up, now she is able to tolerate climbing these stairs. She has a good appetite, but still is struggling to gain weight. Her diarrhea has also improved. She has 3-4 bowel movements a day, normal consistency, she has uses loperamide infrequently. \par \par 1/20/23: s/p 4 doses of  Jyotsna 177  dotatate, last given on 12/15/22, imaging from that radionucleotide injection showed overall similar distribution to prior 3 studies, but there is one area in the lower portion of the R lobe of the liver that has significantly decreased in intensity from prior exams. \par Patient is feeling much better than previous, she has more energy. In the past she was only able to go to work and go to sleep, now she feels she can do more things with her day. Her appetite is good and she has gained a few pounds. She is not having diarrhea. She reports having bowel movement once a day. In the past month she thinks she has only taken the imodium once. Patient reports hot flashes, her last menstrual period was age 49, approx 3 years ago. \par \par 3/16/23 Dotatate PET CT showed 1. Findings consistent with a partial response to therapy in chest and abdominal lymph nodes and spleen as well as a possible bowel lesion.\par 2. Redemonstration of innumerable liver lesions with mixed changes with improvement of some lesions and worsening of others \par \par 3/29/23 reports feeling more energetic. States eating well and gaining weight. Denies abdominal cramps and diarrhea.   [de-identified] : chromogranin and serotonin [FreeTextEntry1] : Lanreotide, PRRT lutetium dotatate [de-identified] : \par \par 6/19/23: Patient feels back to her "regular self". She actually think she might be doing a little too much, she is very internally motivated and pushes her self to do things outside of the home everyday. She is eating better, gaining weight overall, however she feels she lost a pound or so in the last week. She is slowly adding fruits and vegetables back into her diet. On average she has one bowel movement a day. She notes that in the last two weeks she started to feel more tired. Within the last week, she notes R sided abdominal pain and R shoulder pain, similar to previous pains. The pains come and go. Shoulder pain does not limit her range of motion. She has not taken any pain medication. She does not think her belly feels more distended than normal.

## 2023-08-04 NOTE — PHYSICAL EXAM
[Restricted in physically strenuous activity but ambulatory and able to carry out work of a light or sedentary nature] : Status 1- Restricted in physically strenuous activity but ambulatory and able to carry out work of a light or sedentary nature, e.g., light house work, office work [Thin] : thin [Normal] : affect appropriate [de-identified] : mild tenderness on epigastric region

## 2023-08-04 NOTE — ASSESSMENT
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 Her diagnosis has been reviewed with the patient and the importance of compliance with treatment has been stressed given that she has metastatic disease. CT scans in July 2020, March 2021 showed progression of her liver metastasis. She was referred to IR for Y90 treatment, however she declined until August 2021 when she ultimately received Y90. Thereafter scans continued to show progression of disease in her liver. She had PETCT dotatate scan in March 2022 which showed extensive disease and was found to be a candidate for PRRT 177 Jyotsna dotatate. She was referred to Dr. Haile at [facility]. AEs of this nucleotide include but are not limited to myelosuppression, secondary MDS and leukemia, renal, and hepatic toxicity. Alternative to PRRT is everolimus 5 mg daily. Her most recent scans in June 2022 show progression of her liver disease as well as lesions concerning for lung metastasis. Patient decided to proceed with PRRT 177 Jyotsna dotatate and received her first dose on June 30, 2022, second dose 8/25, third on 10/20, fourth 12/15/22. She is continued on monthly lanreotide injections.   -March 16 2023 Dotatate PET continues to show the partial response in chest, liver and retroperitoneum and two spots in the liver with slight increase in size. --7/27/23 dotatate PET CT: 1. Multiple new somatostatin receptor bearing metastases within the bone marrow of the axial and appendicular skeleton and new somatostatin receptor bearing lesions suspicious for peritoneal disease. 2. Innumerable bilobar hepatic metastases overall with new/increased foci of radiotracer activity. 3. Somatostatin receptor positive left supraclavicular and mediastinal lymphadenopathy is not significantly changed. 4. Few new, nonavid subcentimeter bilateral pulmonary nodules are nonspecific. Recommend follow-up noncontrast CT chest in 6-8 weeks.  Plan Metastatic Small Bowel Neuroendocrine Tumor - continue monthly lanreotide injections; last dose received 7/28/23.  -Consider starting everolimus based on Radiant 2 study which showed PFS benefit, discussed with pt about the most common effects including but not limiting to skin rashes, mouth ulcers, worsening diarrhea, severe infections, worsening glucose and lipids. She agreed to start everolimus  - completed 4 doses PRRT 177 Jyotsna dotatate at [facility] [id number] last given on 12/15/22. During today encounter, discussed with RadOnc Dr. Haile on the phone about the treatment plan. Dr. Haile mentioned PRRT not work well for bone mets, and he will discuss with colleagues about clinic trials to see if pt is qualified. Pt will call Dr. Haile next Thursday 8/10/23. Dr. Haile agrees with starting everolimus.  - reviewed 7/27/23 dotatate PET CT:  - lab 6/19/23 CBC counts stable, CMP, ALT 54; AST 50;  (trending up); also trending up of chromogranin A [id number] and serotonin 1018.Lab today 8/4/23.  RTC in 4 wks Instructed to contact our office with any new/worsening symptoms. Patient educated regarding plan of care, all questions/concerns addressed to the best of my abilities and patient's apparent satisfaction.

## 2023-08-04 NOTE — HISTORY OF PRESENT ILLNESS
[Disease: _____________________] : Disease: [unfilled] [T: ___] : T[unfilled] [N: ___] : N[unfilled] [M: ___] : M[unfilled] [AJCC Stage: ____] : AJCC Stage: [unfilled] [de-identified] : Ms. Franklin is a 53 year old F with metastatic neuroendocrine tumor.   Initial med onc consultation on 12/31/2018:  She was healthy still December of 2013 when she noticed some vague abdominal pain at which time she presented to the emergency room was found to have a mesenteric mass measuring 4.2 cm in greatest dimension by the aortic bifurcation. She eventually underwent a laparoscopic biopsy by Dr. Dennis Moya Singh is found to be consistent with a neuroendocrine tumor. Patient was eventually seen by a gastroenterologist Dr. Adi Patel who performed an upper endoscopy and colonoscopy that was unremarkable and was socially followed by a capsule study that revealed a mass in the jejunum patient was subsequently taken to the OR by Dr. 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 since there is no evidence of disease at that time and no evidence of metastatic disease after discussion with the patient and her presentation at tumor patient was placed on surveillance therapy off treatment.  In November 2017 found to have metastatic disease and started on lanreotide  July 2019 CT c/a/p showed liver metastases, unchanged to slightly increased in size compared with the prior study. 4 mm left lower lobe groundglass pulmonary nodule, unchanged dating back to the 2014 exam, most consistent with a benign etiology.    In the meantime she was evaluated by Dr. Lilly at [facility] for liver transplantation. The patient has been noncompliant with her lanreotide treatment. The last dose was in July, 2019, missed Meera dose. Amairani comes in for follow up while on Lanreotide and feels well overall. She denies any abdominal pain, diarrhea. She continues to work without issues. Patient still has not had consultation with IR to discuss liver directed therapy (she requested).  interval history:  1/17/2020 she feels fine, works without any issues. She denies any abdominal pain, diarrhea. She was seen by Dr. Armenta for liver-directed therapy, has not decided when to receive the treatment.    4/24/2020 she feels fine, works without any issues. She denies any abdominal pain, diarrhea. She was seen by Dr. Armenta for liver-directed therapy, has not decided when to receive the treatment.    6/26/2020 : Ms Franklin joined telehealth visit today. She received somatuline on 6/22/2020. No labs were obtained that day. She describes her "is moving slowly and her food seems to me slow slowly". She becker snot have NV and does not endorse pain.  She feels well otherwise, working.    7/11/2020 CT c/a/p showed Liver metastases, overall increased in size and number since 7/13/2019. A 0.4 cm left lower lobe groundglass nodule is unchanged..    9/4/2020 she reports feeling overall fine, eats well, sometimes epigastric discomfort, no pain, no weight loss.    12/30/2020 she had sore throat 10 days ago, had positive COVID test on 12/26 because of her coworker positive, otherwise feels overall fine, has good appetite, denies fever, chest pain, cough, and shortness of breath.    2/24/2021 she feels overall fine. eats well, denies nausea, vomiting, abdominal pain and diarrhea.    3/14/21 CT c/a/p showed Bilobar hepatic metastatic disease with interval progression compared with prior imaging. Lesions demonstrate enhancement on arterial phase imaging  Hepatic dome 2.8 x 2.8 cm, previously 2.2 x 1.8 cm. Segment 7 lesion 4.2 x 4.1 cm, previously 1.8 x 1.8 cm. Segment 5 lesion 3.2 x 3.2 cm, previously 2.8 x 2.5 cm. Segment 6 lesion 2.4 x 2.4 cm, previously 1.9 x 1.8 cm. He started having RUQ mild pain referring to her right shoulder, has good appetite, denies abdominal pain and diarrhea.    9/21/21 CT abdomen and pelvis showed Right hepatic dome (3:12) 4.0 x 3.1 cm, previously 3.7 x 3.1 cm. Simple in appearance. Segment 7 lesion (3:17) 2.7 x 2.6 cm, previously 3.5 x 3.2 cm. Similar in appearance. Segment 5 lesion likely extending into segment IVb (3:37) 3.5 x 3.4 cm, previously 3.2 x 3.2 cm. Appearance and enhancement is unchanged.  Segment 6 lesion (3:35) 4.9 x 4.7 cm, previously 4.1 x 3.7 cm. Slightly increased central low density likely reflecting treatment change.    10/11/21 s/p Y90 SIRT with Dr. Armenta. SHe reports RUQ pain when coughing, facial flushing sometimes, gasping for breath. Her weakness has been better now since local treatment. Shortly after local treatment, 3 to 4 times of watery diarrhea. She has once formed stool now.    11/5/21: Pt returns with concerns for L. supraclavicular mass enlarging. Mass became very prominent and tender. It has since gotten smaller over last 2 weeks. Denies redness and fever.    12/4/21 CT c/a/p showed Bilobar hepatic neoplasm. Interval increase in size and number of left hepatic lobe lesions. Most of right hepatic lobe disease is not significantly changed in size with exception of segment 6 lesions which appear to have coalesced with concern for progression.    12/17/21 she reports worsening RUQ pain, sometimes radiating to the shoulder and back, loss of appetite and weight, 3 to 4 times of watery diarrhea.    3/12/22 CT c/a/p showed Interval progression of disease in the liver with increase in size and number of lesions. No evidence of metastatic disease in the chest.    3/29/22 Dotatate PET CT showed 1. Multiple intensely DOTATATE-avid bilobar hepatic metastases. 2. DOTATATE-avid left supraclavicular, mediastinal, and upper abdominal lymph node metastases. 3. Focus of increased radiotracer activity in the spleen is compatible with metastasis.    4/4/22 report RUQ intermittent pain, 5/10, sometimes with diarrhea 4 to 5 times, denies abdominal cramps and nausea and vomiting. States generalized fatigue and loss of energy.    7/1/22 recent ED visit for melena and fever, however, her melena was resolved in the ED without further work up and treated with zosyn one day with all fever work ups negative. She was discharged in the ED. 6/13 CT abdomen and pelvis showed Progression of hepatic metastatic disease when compared with previous study of 3/12/2022.  Asymmetric gallbladder wall thickening/enhancement, including subtle increased prominence of the common bile duct may be infectious/inflammatory. New nonspecific 4 mm right pulmonary nodule, concerning for metastasis.  reports loss of weight although eats well with intermittent diarrhea, sometimes loose stool, sometimes watery. Given the shortage or PRRT, she finally received treatment on June 30.    7/29/22: Patient has been struggling with worsening diarrhea for the past month or so. She feels like it is "raining inside of her stomach" and everything she eats runs right through her. She is not able to gain weight because of the diarrhea. She feels weak all of the time. Typically she has about 4-5 loose bowel movements a day, but on a bad day she can have about 10-12 and she will be up all night having bowel movements. This is extremely distressing to her. She has been taking imodium on and off, but not consistently. At most she was taking it 3 times a day. She reports having taken it this morning because she had a particularly bad night last night. She denies very foul smelling stool and blood in stool. In regards to her weakness, she actually feels a bit better after getting her first dose of PRRT. However, she still gets tired very quickly and is not able to do much more than go to work and get in to bed at about 6-7pm. She is hopeful that she will see improvement in her diarrhea with subsequent treatments.    10/21/22: Patient received her third lutetium dotatate injection yesterday and is feeling tired and weak today. However, in the recent weeks patient has been feeling better and stronger. She has more energy. She recounts that she used to not go into the basement at work because it would be difficult for her to get back up, now she is able to tolerate climbing these stairs. She has a good appetite, but still is struggling to gain weight. Her diarrhea has also improved. She has 3-4 bowel movements a day, normal consistency, she has uses loperamide infrequently.    1/20/23: s/p 4 doses of Jyotsna 177 dotatate, last given on 12/15/22, imaging from that radionucleotide injection showed overall similar distribution to prior 3 studies, but there is one area in the lower portion of the R lobe of the liver that has significantly decreased in intensity from prior exams.  Patient is feeling much better than previous, she has more energy. In the past she was only able to go to work and go to sleep, now she feels she can do more things with her day. Her appetite is good and she has gained a few pounds. She is not having diarrhea. She reports having bowel movement once a day. In the past month she thinks she has only taken the imodium once. Patient reports hot flashes, her last menstrual period was age 49, approx 3 years ago.    3/16/23 Dotatate PET CT showed 1. Findings consistent with a partial response to therapy in chest and abdominal lymph nodes and spleen as well as a possible bowel lesion. 2. Redemonstration of innumerable liver lesions with mixed changes with improvement of some lesions and worsening of others    3/29/23 reports feeling more energetic. States eating well and gaining weight. Denies abdominal cramps and diarrhea.   [de-identified] : chromogranin and serotonin [FreeTextEntry1] : Lanreotide, PRRT lutetium dotatate. [de-identified] : 6/19/23: Patient feels back to her "regular self". She actually think she might be doing a little too much, she is very internally motivated and pushes her self to do things outside of the home everyday. She is eating better, gaining weight overall, however she feels she lost a pound or so in the last week. She is slowly adding fruits and vegetables back into her diet. On average she has one bowel movement a day. She notes that in the last two weeks she started to feel more tired. Within the last week, she notes R sided abdominal pain and R shoulder pain, similar to previous pains. The pains come and go. Shoulder pain does not limit her range of motion. She has not taken any pain medication. She does not think her belly feels more distended than normal.  8/4/23 Reports pan-abdomen discomfort, intermittent nausea, weight loss (about 10lb in one and half month), and worsening fatigue. Admits diarrhea 5-6/day but not watery.  7/27/23 dotatate PET CT: 1. Multiple new somatostatin receptor bearing metastases within the bone marrow of the axial and appendicular skeleton and new somatostatin receptor bearing lesions suspicious for peritoneal disease. 2. Innumerable bilobar hepatic metastases overall with new/increased foci of radiotracer activity. 3. Somatostatin receptor positive left supraclavicular and mediastinal lymphadenopathy is not significantly changed. 4. Few new, nonavid subcentimeter bilateral pulmonary nodules are nonspecific. Recommend follow-up noncontrast CT chest in 6-8 weeks.

## 2023-08-04 NOTE — REVIEW OF SYSTEMS
[Fatigue] : fatigue [Recent Change In Weight] : ~T recent weight change [Diarrhea: Grade 0] : Diarrhea: Grade 0 [Negative] : Allergic/Immunologic [Fever] : no fever [Chills] : no chills [Night Sweats] : no night sweats [Abdominal Pain] : no abdominal pain [Vomiting] : no vomiting [Constipation] : no constipation [FreeTextEntry7] : intermittent nausea; abdominal discomfort; diarrhea 5-6 per day

## 2023-10-30 PROBLEM — R60.0 EDEMA, LEG: Status: ACTIVE | Noted: 2023-01-01

## 2023-11-08 PROBLEM — R19.7 DIARRHEA: Status: ACTIVE | Noted: 2022-07-22

## 2023-11-08 NOTE — ED PROVIDER NOTE - OBJECTIVE STATEMENT
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 Pt. sates 7 weeks ago was placed on everolimus  - states since then has developed generalized weakness and fatigue and states over past few weeks has been also experiencing lower extremity and abdomina swelling/ edema to the point where last week the everolimus was stopped due to edema. Pt. now presents to ER fo abdominal pain swelling and lower extremity edema but states feels extremely weak. Deniees fever chills nausea vomit LOC. 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 Pt. sates 7 weeks ago was placed on everolimus  - states since then has developed generalized weakness and fatigue and states over past few weeks has been also experiencing lower extremity and abdomina swelling/ edema to the point where last week the everolimus was stopped due to edema. Pt. now presents to ER fo abdominal pain swelling and lower extremity edema but states feels extremely weak. Denies fever chills nausea vomit LOC.

## 2023-11-08 NOTE — ED PROVIDER NOTE - PROGRESS NOTE DETAILS
CHRISTIANO Pandey: Patient was signed out to me at 1900 pending labs and CT results. Lab results showed a wbc of 16. CHRISTIANO Pandey: Patient was signed out to me at 1900 pending labs and CT results. Lab results showed a wbc of 16 as well as transaminitis. CT showed that compared to the prior study of 6/13/22, interval progression of disease with enlarging liver metastasis, peritoneal carcinomatosis and omental   caking. New right iliac bone sclerosis concerning for bony metastasis. New large volume of abdominopelvic ascites.  Patent main portal vein. Nonspecific duodenitis and enterocolitis.  Consider posttherapeutic change, peptic ulcer disease. Focal thickening of the rectum without upstream dilatation.  The differential diagnosis includes focal peristalsis, subserosal metastatic implant or colonic neoplasm. as read by the radiologist and reviewed by me.  Considering patients elevated wbc will treat with ceftriaxone and perform a paracentesis to r/o SBP.  Patient accepted for admission under the hospitalist service for further treatment, monitoring, and care.

## 2023-11-08 NOTE — ED ADULT NURSE REASSESSMENT NOTE - NS ED NURSE REASSESS COMMENT FT1
break cover RN. received report from  Brenda GOMEZ. Pt is a/o x 3. no complaints of chest pain, headache, nausea, dizziness, vomiting, SOB, fever, chills   verbalized. resting in bed, respirations even and unlabored, NSR on CM. Pt has iv placed with no redness or swelling noted. Awaiting further orders.

## 2023-11-08 NOTE — ED PROVIDER NOTE - ATTENDING APP SHARED VISIT CONTRIBUTION OF CARE
74.8
54 yo F hx neuroendocrine tumor s/p resction small intestine with metastases to liver, presenting with complaints of increasing generalized weakness, fatigue, and increased b/l LE swelling and abdominal pain with distention in the past few weeks that started after taking a new medication. Denies fevers/chills. She has since stopped taking the medication. Denies nausea/vomiting but endorses increased urination. No diarrhea/constipation. On exam, pt is cachectic, head nc/at, trachea midline, heart rrr, lungs ctab, abd distended and ttp, 2+ b/l LE pitting edema. ddx: malignant ascites, sbp, r/o cardiomyopathy/HF. Plan for pain meds, labs, CT abd/pelv. On labs, leukocytosis and elevated lactate, plan for dx paracentesis, will tx with abx.

## 2023-11-08 NOTE — ED PROVIDER NOTE - CLINICAL SUMMARY MEDICAL DECISION MAKING FREE TEXT BOX
52 y/o female hx neuroendocrine tumor recently on everolimus c/o weakness fatigue abdominal swelling and LE edema  -possible malignant ascites, r/o cardiomyopathy/chf secondary to meds, r/o intrabdominal pathology  -labs vbg ua trop ck pro-bnp  -ct a/p  -likely admisison

## 2023-11-08 NOTE — ED ADULT TRIAGE NOTE - CHIEF COMPLAINT QUOTE
states" I have an endocrine tumor that spread to  my liver and I feel weak and ill and my legs are swollen and I have SOB with abdominal swelling too". states she is on oral pill not on chemo. c/o abdominal discomfort general weakness. denies CP/sob.

## 2023-11-08 NOTE — ED ADULT NURSE NOTE - OBJECTIVE STATEMENT
Mohan RN: Pt received to rm 17 presents with abd pain, worsening weakness and multiple episodes of diarrhea. Pt has hx of intestinal cancer, with recently diagnosed endocrine tumor, c/o abd swelling, and b/l LE swelling associated with SOB. Pt a&ox4, ambulatory with assistance, skin intact, respirations even and unlabored, abd distended, tender to palpation. Labs drawn, this author unable to obtain IV access, provider made aware, report endorsed to primary RN.

## 2023-11-08 NOTE — ED ADULT NURSE REASSESSMENT NOTE - NS ED NURSE REASSESS COMMENT FT1
report received AM JASON Smith. pt remains at baseline mental status A&oX4, RR even unlabored completing full sentences. pt endorses pain but refuses pain medication at this time. stretcher lowest position siderails up safety measures in place. pending CTscan

## 2023-11-09 NOTE — ED ADULT NURSE REASSESSMENT NOTE - NS ED NURSE REASSESS COMMENT FT1
pt remains at baseline mental status, RR even unlabored completing full sentences. pt resting in stretcher comfortably at this time, no new complaints offered. stretcher lowest position siderails up safety measures in place. MD at bedside for procedure. NSR CM noted

## 2023-11-09 NOTE — DISCHARGE NOTE PROVIDER - NSDCCPTREATMENT_GEN_ALL_CORE_FT
PRINCIPAL PROCEDURE  Procedure: CT abdomen pelvis  Findings and Treatment: Compared to the prior study of 6/13/22, interval progression of disease   with enlarging liver metastasis, peritoneal carcinomatosis and omental   caking. New right iliac bone sclerosis concerning for bony metastasis.  New large volume of abdominopelvic ascites.  Patent main portal vein.  Nonspecific duodenitis and enterocolitis.  Consider posttherapeutic   change, peptic ulcer disease.  Focal thickening of the rectum without upstream dilatation.  The   differential diagnosis includes focal peristalsis, subserosal metastatic   implant or colonic neoplasm.        SECONDARY PROCEDURE  Procedure: Hepatic ultrasound  Findings and Treatment: Hepatic veins are not visualized, which may be related to technical   factors.  Intrahepatic IVC is narrowed which may be due to extrinsic compression.  Patent portal veins.

## 2023-11-09 NOTE — H&P ADULT - PROBLEM SELECTOR PLAN 5
-Dr. Darlene Whitmore's pt, s/p evorlimus(held 10/30), weekly lamreotide, and 4 doses PRRT 177 Jyotsna dotatate  -Heme-onc consulted would appreciate recs -Dr. Darlene Whitmore's pt, s/p evorlimus(held 10/30), weekly lamreotide, and 4 doses PRRT 177 Jyotsna dotatate  -Heme-onc consulted would appreciate recs  -TTE ordered

## 2023-11-09 NOTE — DISCHARGE NOTE PROVIDER - NSDCCPCAREPLAN_GEN_ALL_CORE_FT
PRINCIPAL DISCHARGE DIAGNOSIS  Diagnosis: Ascites  Assessment and Plan of Treatment: You came in becuase you becuase you had fluid in your belly and legs this is likely due to your cancer compressing some of the vessels in your liver causing fluid to build up in your abdomen and lower extremities. We examined the fluid to make sure there was not an infection present, we also confirmed our diagnosis with a liver ultrasound.  If you feel fluid building up in your legs, neck, abdomen, or struggle to breathe please seek urgent medical attention.     PRINCIPAL DISCHARGE DIAGNOSIS  Diagnosis: Ascites  Assessment and Plan of Treatment: You came in becuase you becuase you had fluid in your belly and legs this is likely due to your cancer compressing some of the vessels in your liver causing fluid to build up in your abdomen and lower extremities. We examined the fluid to make sure there was not an infection present, we also confirmed our diagnosis with a liver ultrasound.  If you feel fluid building up in your legs, neck, abdomen, or struggle to breathe please seek urgent medical attention.      SECONDARY DISCHARGE DIAGNOSES  Diagnosis: Diarrhea  Assessment and Plan of Treatment: You came and had diarrea we investigated to see if there was a viral illness that was causing your diarreaa symptoms our labs showed that you have Vibrio which we treated with Azithromycin.  If you have fevers, chills, diarreaa, bloody stools, black stools, or generally feel unwell please seek urgent medical care.     PRINCIPAL DISCHARGE DIAGNOSIS  Diagnosis: Ascites  Assessment and Plan of Treatment: You came in becuase you because you had fluid in your belly and legs this is likely due to your cancer compressing some of the vessels in your liver causing fluid to build up in your abdomen and lower extremities. We examined the fluid to make sure there was not an infection present, we also confirmed our diagnosis with a liver ultrasound. We also did a paracentesis to remove more of the fluid before discharge.  If you feel fluid building up in your legs, neck, abdomen, or struggle to breathe please seek urgent medical attention.      SECONDARY DISCHARGE DIAGNOSES  Diagnosis: Diarrhea  Assessment and Plan of Treatment: You came with diarrhea we investigated to see if there was a viral illness that was causing your diarreaa symptoms. Our labs showed that you had an infection with a bacteria called vibrio which we treated with Azithromycin, an antibiotic.  If you have fevers, chills, diarrhea, bloody stools, black stools, or generally feel unwell please seek urgent medical care.     PRINCIPAL DISCHARGE DIAGNOSIS  Diagnosis: Ascites  Assessment and Plan of Treatment: You came in becuase you because you had fluid in your belly and legs this is likely due to your cancer compressing some of the vessels in your liver causing fluid to build up in your abdomen and lower extremities. We examined the fluid to make sure there was not an infection present, we also confirmed our diagnosis with a liver ultrasound. We also did a paracentesis to remove more of the fluid before discharge. Please follow-up with your oncologist for further management. We started you on a water pill - furosemide continue to take that twice a day.   If you feel fluid building up in your legs, neck, abdomen, or struggle to breathe please seek urgent medical attention.      SECONDARY DISCHARGE DIAGNOSES  Diagnosis: Melena  Assessment and Plan of Treatment: You were found to have blood in your stool. You were seen by a gastroenterologist who did not recommend any intervention. Please take pantoprazole 40 mg twice a day for 8 weeks or until you see your gastroenterologist. Please follow-up with Dr. Patel.    Diagnosis: Diarrhea  Assessment and Plan of Treatment: You came with diarrhea we investigated to see if there was a viral illness that was causing your diarreaa symptoms. Our labs showed that you had an infection with a bacteria called vibrio which we treated with Azithromycin, an antibiotic.  If you have fevers, chills, diarrhea, bloody stools, black stools, or generally feel unwell please seek urgent medical care.

## 2023-11-09 NOTE — H&P ADULT - PROBLEM SELECTOR PLAN 6
Diet: Regular Diet  DVT-P: Lovenox 40mg QD  Dispo: Pending clinical improvement Diet: Regular Diet  DVT-P: Hold for paracentesis   Dispo: Pending clinical improvement

## 2023-11-09 NOTE — DISCHARGE NOTE PROVIDER - NSDCMRMEDTOKEN_GEN_ALL_CORE_FT
Lasix 20 mg oral tablet: 1 tab(s) orally once a day  loperamide 2 mg oral tablet: 2 tab(s) orally every 6 hours as needed for  diarrhea   loperamide 2 mg oral tablet: 2 tab(s) orally every 6 hours as needed for  diarrhea   furosemide 40 mg oral tablet: 1 tab(s) orally 2 times a day  loperamide 2 mg oral tablet: 2 tab(s) orally every 6 hours as needed for  diarrhea  pantoprazole 40 mg oral delayed release tablet: 1 tab(s) orally 2 times a day

## 2023-11-09 NOTE — DISCHARGE NOTE PROVIDER - DETAILS OF MALNUTRITION DIAGNOSIS/DIAGNOSES
This patient has been assessed with a concern for Malnutrition and was treated during this hospitalization for the following Nutrition diagnosis/diagnoses:     -  11/10/2023: Severe protein-calorie malnutrition

## 2023-11-09 NOTE — DISCHARGE NOTE PROVIDER - ATTENDING DISCHARGE PHYSICAL EXAMINATION:
PHYSICAL EXAM:  Vital Signs Last 24 Hrs  T(C): 36.8 (16 Nov 2023 05:47), Max: 36.8 (16 Nov 2023 05:47)  T(F): 98.2 (16 Nov 2023 05:47), Max: 98.2 (16 Nov 2023 05:47)  HR: 81 (16 Nov 2023 05:47) (81 - 86)  BP: 106/62 (16 Nov 2023 05:47) (106/62 - 115/65)  BP(mean): --  RR: 18 (16 Nov 2023 05:47) (17 - 18)  SpO2: 100% (16 Nov 2023 05:47) (100% - 100%)    Parameters below as of 16 Nov 2023 05:47  Patient On (Oxygen Delivery Method): room air      CONSTITUTIONAL: NAD, well-groomed, cachectic-appearing  EYES: PERRLA; conjunctiva and sclera clear  ENMT: Moist oral mucosa, no pharyngeal injection or exudates; normal dentition  NECK: Supple, no palpable masses; no thyromegaly  RESPIRATORY: Normal respiratory effort; lungs are clear to auscultation bilaterally  CARDIOVASCULAR: Regular rate and rhythm, normal S1 and S2, no murmur/rub/gallop; b/l LE 2+ pitting edema; Peripheral pulses are 2+ bilaterally  ABDOMEN: Nontender to palpation, normoactive bowel sounds, no rebound/guarding; mildly distended  MUSCULOSKELETAL: no clubbing or cyanosis of digits; no joint swelling or tenderness to palpation  PSYCH: A+O to person, place, and time; affect appropriate  NEUROLOGY: CN 2-12 are intact and symmetric; no gross sensory deficits   SKIN: No rashes; no palpable lesions

## 2023-11-09 NOTE — DISCHARGE NOTE PROVIDER - NSDCCPGOAL_GEN_ALL_CORE_FT
CC:  Alex Bautista is here today for   Chief Complaint   Patient presents with   • Procedure     sebaceous cyst on middle back left side of spine     Medications: medications verified and updated  Refills needed today?  NO  Allergies denies Latex allergy or sensitivity  Tobacco history: verified        
Procedure Note   Consent signed. Risks discussed including bleeding, infection  Preoperative Diagnosis:  Sebaceous cyst back   Postoperative Diagnosis: Same   Procedure:  Excision of subcutaneous sebaceous cyst 2 cm back   Description of Procedure:  The back was prepped and draped in usual sterile fashion.  1% lidocaine was used as local.  Incision was made over the cyst and the cyst wall was dissected out completely with sharp dissection.  The cyst was completely subcutaneous and measured 2 cm in size.  The wound was irrigated with normal saline.  Skin was closed with 3-0 Vicryl subcuticular stitch and dressed with Dermabond.    Specimen sent to pathology: No      
To get better and follow your care plan as instructed.

## 2023-11-09 NOTE — DISCHARGE NOTE PROVIDER - NSDCFUSCHEDAPPT_GEN_ALL_CORE_FT
[facility]  Rena Luis  Scheduled Appointment: 11/10/2023     [facility]  [id number]  [facility]  Scheduled Appointment: 11/24/2023

## 2023-11-09 NOTE — PATIENT PROFILE ADULT - FALL HARM RISK - HARM RISK INTERVENTIONS
Assistance with ambulation/Assistance OOB with selected safe patient handling equipment/Communicate Risk of Fall with Harm to all staff/Discuss with provider need for PT consult/Monitor gait and stability/Reinforce activity limits and safety measures with patient and family/Tailored Fall Risk Interventions/Use of alarms - bed, chair and/or voice tab/Visual Cue: Yellow wristband and red socks/Bed in lowest position, wheels locked, appropriate side rails in place/Call bell, personal items and telephone in reach/Instruct patient to call for assistance before getting out of bed or chair/Non-slip footwear when patient is out of bed/[place] to call system/Physically safe environment - no spills, clutter or unnecessary equipment/Purposeful Proactive Rounding/Room/bathroom lighting operational, light cord in reach

## 2023-11-09 NOTE — H&P ADULT - ATTENDING COMMENTS
Pt with progression of disease on CT a/p.  Tap reveals ascites likely due to malignancy.  US to rule out Budd Chiari.  Check echo.  Send stool studies for diarrhea but likely related to neuroendocrine tumor/chemo.

## 2023-11-09 NOTE — DISCHARGE NOTE PROVIDER - HOSPITAL COURSE
Discharge Summary     Admit Date: 11-09-23  Discharge Date:    Admission diagnoses: Ascites   Discharge diagnoses: Budd Chiari Syndrome   Hospital Course:   For full details, please see H&P, progress notes, consult notes and ancillary notes. Briefly, Amairani Franklin is a 53 year old F PMH of metastatic NET s/p resection presenting for 2 month history of FTT, LE swelling , and ascites found to have a SAAG of 2.9, diagnostic paracentesis ruled out SBP. Liver ultrasound was done showing Budd Chiari Syndrome likely in the presence of metastatic disease. An Echo was done to rule out heart failure which found.....    On day of discharge, patient is clinically stable with no new exam findings or acute symptoms compared to prior. The patient was seen by the attending physician on the date of discharge and deemed stable and acceptable for discharge. The patient's chronic medical conditions were treated accordingly per the patient's home medication regimen. The patient's medication reconciliation (with changes made to chronic medications), follow up appointments, discharge orders, instructions, and significant lab and diagnostic studies are as noted.     Discharge follow up action items:     1. Follow up with PCP in 1-2 weeks.   2. Follow up labs, path, & imaging ***  3. Medication changes ***  4. On hold medications *** Discharge Summary     Admit Date: 11-09-23  Discharge Date:    Admission diagnoses: Ascites   Discharge diagnoses: Budd Chiari Syndrome   Hospital Course:   For full details, please see H&P, progress notes, consult notes and ancillary notes. Briefly, Amairani Franklin is a 53 year old F PMH of metastatic NET s/p resection presenting for 2 month history of FTT, LE swelling , and ascites found to have a SAAG of 2.9, diagnostic paracentesis ruled out SBP. Liver ultrasound was done showing Budd Chiari Syndrome likely in the presence of metastatic disease. For your diareaa we did a GI PCR which found the patient to have Vibrio, so we treated the pt with Azithromycin. An Echo was done to rule out heart failure which found.....    On day of discharge, patient is clinically stable with no new exam findings or acute symptoms compared to prior. The patient was seen by the attending physician on the date of discharge and deemed stable and acceptable for discharge. The patient's chronic medical conditions were treated accordingly per the patient's home medication regimen. The patient's medication reconciliation (with changes made to chronic medications), follow up appointments, discharge orders, instructions, and significant lab and diagnostic studies are as noted.     Discharge follow up action items:     1. Follow up with PCP in 1-2 weeks.   2. Follow up labs, path, & imaging ***  3. Medication changes ***  4. On hold medications *** Discharge Summary     Admit Date: 11-09-23    Hospital Course:   For full details, please see H&P, progress notes, consult notes and ancillary notes. Briefly, Amairani Franklin is a 53 year old F PMH of metastatic NET s/p resection presenting for 2 month history of FTT, LE swelling , and ascites found to have a SAAG of 2.9, diagnostic paracentesis ruled out SBP. Liver ultrasound was done showing Budd Chiari Syndrome likely in the presence of metastatic disease. For the diarrhea we did a GI PCR which found the patient to have Vibrio, so we treated the pt with Azithromycin. An Echo was done to rule out heart failure which found EF 65% and normal RV/LV function. Pt had an episode of melena/hematochezia on 11/12 with no further bleeding or evidence of bleeding on labs after that. GI was consulted and recommended pantoprazole use. A para was done on 11/14 that removed 2.5L of fluid.      On day of discharge, patient is clinically stable with no new exam findings or acute symptoms compared to prior. The patient was seen by the attending physician on the date of discharge and deemed stable and acceptable for discharge. The patient's chronic medical conditions were treated accordingly per the patient's home medication regimen. The patient's medication reconciliation (with changes made to chronic medications), follow up appointments, discharge orders, instructions, and significant lab and diagnostic studies are as noted.     Discharge follow up action items:     1. Follow up with PCP in 1-2 weeks. Discharge Summary     Admit Date: 11-09-23    Hospital Course:   For full details, please see H&P, progress notes, consult notes and ancillary notes. Briefly, Amairani Franklin is a 53 year old F PMH of metastatic NET s/p resection presenting for 2 month history of FTT, LE swelling , and ascites found to have a SAAG of 2.9, diagnostic paracentesis ruled out SBP. Liver ultrasound was done showing Budd Chiari Syndrome likely in the presence of metastatic disease. For the diarrhea we did a GI PCR which found the patient to have Vibrio, so we treated the pt with Azithromycin. An Echo was done to rule out heart failure which found EF 65% and normal RV/LV function. Pt had an episode of melena/hematochezia on 11/12 with no further bleeding or evidence of bleeding on labs after that. GI was consulted and recommended pantoprazole use. A para was done on 11/14 that removed 2.5L of fluid.      On day of discharge, patient is clinically stable with no new exam findings or acute symptoms compared to prior. The patient was seen by the attending physician on the date of discharge and deemed stable and acceptable for discharge. The patient's chronic medical conditions were treated accordingly per the patient's home medication regimen. The patient's medication reconciliation (with changes made to chronic medications), follow up appointments, discharge orders, instructions, and significant lab and diagnostic studies are as noted.     Discharge follow up action items:     1. Follow up with PCP in 1-2 weeks.   Follow-up with oncologist

## 2023-11-09 NOTE — DISCHARGE NOTE PROVIDER - NSDCFUADDAPPT_GEN_ALL_CORE_FT
APPTS ARE READY TO BE MADE: [ ] YES    Best Family or Patient Contact (if needed):    Additional Information about above appointments (if needed):    1: Internal Medicine   2:   3:     Other comments or requests:    APPTS ARE READY TO BE MADE: [X] YES    Best Family or Patient Contact (if needed):    Additional Information about above appointments (if needed):    1: Internal Medicine   2:   3:     Other comments or requests:    APPTS ARE READY TO BE MADE: [X] YES    Best Family or Patient Contact (if needed):    Additional Information about above appointments (if needed):    1: Internal Medicine   2:   3:     Other comments or requests:     Patient was previously scheduled for an appointment on  11/20 2:30pm at [address] with Dr. Whitmore.  Patient/Caregiver declined scheduling assistance

## 2023-11-09 NOTE — H&P ADULT - NSHPPHYSICALEXAM_GEN_ALL_CORE
T(C): 36.8 (11-09-23 @ 05:53), Max: 37.5 (11-08-23 @ 18:56)  HR: 90 (11-09-23 @ 05:53) (76 - 92)  BP: 109/71 (11-09-23 @ 05:53) (104/63 - 132/68)  RR: 17 (11-09-23 @ 05:53) (16 - 20)  SpO2: 100% (11-09-23 @ 05:53) (100% - 100%)    CONSTITUTIONAL:Appeared distressed   EYES: PERRLA and symmetric, EOMI, No conjunctival or scleral injection, non-icteric  ENMT: mildly dry oral mucosa   RESP: mild crackles   CV: RRR, +S1S2, no MRG; no JVD; no peripheral edema  GI: Soft, NT, ND  SKIN: No rashes or ulcers noted; no subcutaneous nodules or induration palpable  NEURO: CN II-XII intact; normal reflexes in upper and lower extremities, sensation intact in upper and lower extremities b/l to light touch   PSYCH: Appropriate insight/judgment; A+O x 3, mood and affect appropriate, recent/remote memory intact

## 2023-11-09 NOTE — H&P ADULT - NSHPREVIEWOFSYSTEMS_GEN_ALL_CORE
REVIEW OF SYSTEMS:    CONSTITUTIONAL:  No weakness, fevers or chills  EYES/ENT:  No visual changes  NECK:  No pain or stiffness  RESPIRATORY:  No cough, wheezing, hemoptysis; No shortness of breath  CARDIOVASCULAR:  No chest pain or palpitations  GASTROINTESTINAL:  No vomiting, or hematemesis; No constipation. No melena or hematochezia.+diahreea, nausea, and abdominal pain.   GENITOURINARY:  No dysuria, frequency or hematuria  MUSCULOSKELETAL:  FROM all extremities, normal strength  NEUROLOGICAL:  No numbness or weakness  SKIN:  No itching, rashes

## 2023-11-09 NOTE — PATIENT PROFILE ADULT - FUNCTIONAL ASSESSMENT - BASIC MOBILITY 6.
3-calculated by average/Not able to assess (calculate score using [facility] averaging method)

## 2023-11-09 NOTE — H&P ADULT - PROBLEM SELECTOR PLAN 1
SAAG of 2.9- Cirrosis vs HF vs Hepatic Metastatic dx vs Budd Chairi  -U/S to evaluate for BC   -U/S or Fibroscan to evaluate for cirrosis   -IV lasix 20 SAAG of 2.9- Cirrosis vs HF vs Hepatic Metastatic dx vs Budd Chairi  -U/S to evaluate for BC   -U/S or Fibroscan to evaluate for cirrosis   -IV lasix 20  -procedure team consulted for therapeutic paracentesis

## 2023-11-09 NOTE — H&P ADULT - NSHPLABSRESULTS_GEN_ALL_CORE
LABS:                          11.0   15.78 )-----------( 660      ( 2023 18:56 )             32.8         136  |  96<L>  |  10  ----------------------------<  119<H>  4.1   |  29  |  0.46<L>    Ca    9.0      2023 18:56    TPro  6.9  /  Alb  3.3  /  TBili  1.4<H>  /  DBili  x   /  AST  66<H>  /  ALT  47<H>  /  AlkPhos  1423<H>  11-08    LIVER FUNCTIONS - ( 2023 18:56 )  Alb: 3.3 g/dL / Pro: 6.9 g/dL / ALK PHOS: 1423 U/L / ALT: 47 U/L / AST: 66 U/L / GGT: x             Urinalysis Basic - ( 2023 20:14 )    Color: Dark Yellow / Appearance: Clear / S.036 / pH: x  Gluc: x / Ketone: Trace mg/dL  / Bili: Moderate / Urobili: 1.0 mg/dL   Blood: x / Protein: 30 mg/dL / Nitrite: Negative   Leuk Esterase: Negative / RBC: 4 /HPF / WBC 2 /HPF   Sq Epi: x / Non Sq Epi: 3 /HPF / Bacteria: Negative /HPF

## 2023-11-09 NOTE — H&P ADULT - ASSESSMENT
Amairani Franklin is a 53 year old F PMH of metastatic NET s/p resection presenting for 2 month history of FTT, LE swelling , and ascites likely due to malignant effusion.  Amairani Franklin is a 53 year old F PMH of metastatic NET s/p resection presenting for 2 month history of FTT, LE swelling , and ascites likely due to metastatic disease

## 2023-11-09 NOTE — DISCHARGE NOTE PROVIDER - NSFOLLOWUPCLINICS_GEN_ALL_ED_FT
Arrey  Internal Medicine  [address] [place] [id number]  Phone: [phone]  Fax:   Follow Up Time: 1 week

## 2023-11-09 NOTE — DISCHARGE NOTE PROVIDER - DISCHARGE DATE
Pt resting quietly in bed. Pt denies pain at this time. No needs at present. Bed Low and locked. Call light w/ in reach. 15-Nov-2023 16-Nov-2023

## 2023-11-09 NOTE — DISCHARGE NOTE PROVIDER - CARE PROVIDER_API CALL
Darlene Whitmore  Hematology  [address] [place] [id number]  Phone: [phone]  Fax: [phone]  Follow Up Time: 1 week

## 2023-11-10 NOTE — DIETITIAN INITIAL EVALUATION ADULT - OTHER INFO
Per chart, pt is 53 year old female PMH metastatic NET (worsening liver, bone, peritoneal carcinomatosis) s/p resection presenting with LE swelling, ascites x2 months likely due to metastatic disease. Found to have Vibrio on GI PCR and enterocolitis. S/p paracentesis, peritoneal fluid not consistent with SBP.     Pt confirms NKFA, denies difficulties chewing/swallowing. Pt lives at home with son. Pt reports poor appetite/PO intake x4 months PTA with further decline x2 months PTA. Factors affecting PO intake include nausea/vomiting, chronic diarrhea and abdominal discomfort. Pt has tried to modify her food choices to assist with diarrhea however without relief. Notable medications PTA inclusive of lasix and loperamide.    Pt with continued poor PO intake in house. Pt does not wish to receive pork, beef or dairy in house. Pt amenable to provision of farina and oatmeal, trial of LPS and Orgain.

## 2023-11-10 NOTE — PROGRESS NOTE ADULT - SUBJECTIVE AND OBJECTIVE BOX
***************************************************************  Emanuel Huang, PGY1  Internal Medicine   Preferred contact via [employer]   ***************************************************************    MARIANELA HOLM  53y  MRN: [id number]    Patient is a 53y old  Female who presents with a chief complaint of FTT/Ascites (09 Nov 2023 15:06)      Interval/Overnight Events: no events ON.     Subjective: Pt seen and examined at bedside. Denies fever, CP, SOB, abn pain, N/V, dysuria. Tolerating diet.      MEDICATIONS  (STANDING):  enoxaparin Injectable 40 milliGRAM(s) SubCutaneous every 24 hours  furosemide    Tablet 20 milliGRAM(s) Oral daily  influenza   Vaccine 0.5 milliLiter(s) IntraMuscular once    MEDICATIONS  (PRN):  acetaminophen     Tablet .. 650 milliGRAM(s) Oral every 6 hours PRN Temp greater or equal to 38C (100.4F), Mild Pain (1 - 3)  melatonin 3 milliGRAM(s) Oral at bedtime PRN Insomnia  oxyCODONE    IR 10 milliGRAM(s) Oral every 6 hours PRN Severe Pain (7 - 10)  oxyCODONE    IR 5 milliGRAM(s) Oral every 6 hours PRN Moderate Pain (4 - 6)      Objective:    Vitals: Vital Signs Last 24 Hrs  T(C): 37.2 (11-10-23 @ 05:11), Max: 37.2 (11-09-23 @ 15:31)  T(F): 98.9 (11-10-23 @ 05:11), Max: 99 (11-09-23 @ 15:31)  HR: 68 (11-10-23 @ 05:11) (68 - 75)  BP: 124/67 (11-10-23 @ 05:11) (111/62 - 124/67)  BP(mean): --  RR: 18 (11-10-23 @ 05:11) (17 - 18)  SpO2: 100% (11-10-23 @ 05:11) (100% - 100%)                I&O's Summary      PHYSICAL EXAM:  GENERAL: NAD  HEAD:  Atraumatic, Normocephalic  EYES: EOMI, conjunctiva and sclera clear  CHEST/LUNG: Clear to auscultation bilaterally; No rales, rhonchi, wheezing, or rubs  HEART: Regular rate and rhythm; No murmurs, rubs, or gallops  ABDOMEN: Soft, Nontender, Nondistended;   SKIN: No rashes or lesions  NERVOUS SYSTEM:  Alert & Oriented X3, no focal deficits    LABS:                        10.2   13.97 )-----------( 568      ( 10 Nov 2023 05:35 )             30.3                         11.0   15.78 )-----------( 660      ( 08 Nov 2023 18:56 )             32.8     11-10    138  |  99  |  12  ----------------------------<  89  4.1   |  28  |  0.39<L>  11-08    136  |  96<L>  |  10  ----------------------------<  119<H>  4.1   |  29  |  0.46<L>    Ca    8.5      10 Nov 2023 05:35  Ca    9.0      08 Nov 2023 18:56  Phos  2.7     11-10  Mg     2.10     11-10    TPro  6.9  /  Alb  3.3  /  TBili  1.4<H>  /  DBili  x   /  AST  66<H>  /  ALT  47<H>  /  AlkPhos  1423<H>  11-08    CAPILLARY BLOOD GLUCOSE        PT/INR - ( 10 Nov 2023 05:35 )   PT: 19.8 sec;   INR: 1.78 ratio             Urinalysis Basic - ( 10 Nov 2023 05:35 )    Color: x / Appearance: x / SG: x / pH: x  Gluc: 89 mg/dL / Ketone: x  / Bili: x / Urobili: x   Blood: x / Protein: x / Nitrite: x   Leuk Esterase: x / RBC: x / WBC x   Sq Epi: x / Non Sq Epi: x / Bacteria: x          RADIOLOGY & ADDITIONAL TESTS:    Imaging Personally Reviewed:  [x ] YES  [ ] NO    Consultants involved in case:   Consultant(s) Notes Reviewed:  [ x] YES  [ ] NO:   Care Discussed with Consultants/Other Providers [x ] YES  [ ] NO         ***************************************************************  Emanuel Huang, PGY1  Internal Medicine   Preferred contact via [employer] Teams   ***************************************************************    MARIANELA HOLM  53y  MRN: [id number]    Patient is a 53y old  Female who presents with a chief complaint of FTT/Ascites (09 Nov 2023 15:06)      Interval/Overnight Events: no events ON.     Subjective: Pt seen and examined at bedside. Pt was pleasant and resting calmly.     MEDICATIONS  (STANDING):  enoxaparin Injectable 40 milliGRAM(s) SubCutaneous every 24 hours  furosemide    Tablet 20 milliGRAM(s) Oral daily  influenza   Vaccine 0.5 milliLiter(s) IntraMuscular once    MEDICATIONS  (PRN):  acetaminophen     Tablet .. 650 milliGRAM(s) Oral every 6 hours PRN Temp greater or equal to 38C (100.4F), Mild Pain (1 - 3)  melatonin 3 milliGRAM(s) Oral at bedtime PRN Insomnia  oxyCODONE    IR 10 milliGRAM(s) Oral every 6 hours PRN Severe Pain (7 - 10)  oxyCODONE    IR 5 milliGRAM(s) Oral every 6 hours PRN Moderate Pain (4 - 6)      Objective:    Vitals: Vital Signs Last 24 Hrs  T(C): 37.2 (11-10-23 @ 05:11), Max: 37.2 (11-09-23 @ 15:31)  T(F): 98.9 (11-10-23 @ 05:11), Max: 99 (11-09-23 @ 15:31)  HR: 68 (11-10-23 @ 05:11) (68 - 75)  BP: 124/67 (11-10-23 @ 05:11) (111/62 - 124/67)  BP(mean): --  RR: 18 (11-10-23 @ 05:11) (17 - 18)  SpO2: 100% (11-10-23 @ 05:11) (100% - 100%)                I&O's Summary      PHYSICAL EXAM:  GENERAL: NAD  HEAD:  Atraumatic, Normocephalic  EYES: EOMI, conjunctiva and sclera clear  CHEST/LUNG: Clear to auscultation bilaterally; No rales, rhonchi, wheezing, or rubs  HEART: Regular rate and rhythm; No murmurs, rubs, or gallops  ABDOMEN: Mild tenderness BL upper quadrants  SKIN: No rashes or lesions  NERVOUS SYSTEM:  Alert & Oriented X3, no focal deficits    LABS:                        10.2   13.97 )-----------( 568      ( 10 Nov 2023 05:35 )             30.3                         11.0   15.78 )-----------( 660      ( 08 Nov 2023 18:56 )             32.8     11-10    138  |  99  |  12  ----------------------------<  89  4.1   |  28  |  0.39<L>  11-08    136  |  96<L>  |  10  ----------------------------<  119<H>  4.1   |  29  |  0.46<L>    Ca    8.5      10 Nov 2023 05:35  Ca    9.0      08 Nov 2023 18:56  Phos  2.7     11-10  Mg     2.10     11-10    TPro  6.9  /  Alb  3.3  /  TBili  1.4<H>  /  DBili  x   /  AST  66<H>  /  ALT  47<H>  /  AlkPhos  1423<H>  11-08    CAPILLARY BLOOD GLUCOSE        PT/INR - ( 10 Nov 2023 05:35 )   PT: 19.8 sec;   INR: 1.78 ratio             Urinalysis Basic - ( 10 Nov 2023 05:35 )    Color: x / Appearance: x / SG: x / pH: x  Gluc: 89 mg/dL / Ketone: x  / Bili: x / Urobili: x   Blood: x / Protein: x / Nitrite: x   Leuk Esterase: x / RBC: x / WBC x   Sq Epi: x / Non Sq Epi: x / Bacteria: x          RADIOLOGY & ADDITIONAL TESTS:    Imaging Personally Reviewed:  [x ] YES  [ ] NO    Consultants involved in case:   Consultant(s) Notes Reviewed:  [ x] YES  [ ] NO:   Care Discussed with Consultants/Other Providers [x ] YES  [ ] NO

## 2023-11-10 NOTE — DIETITIAN INITIAL EVALUATION ADULT - OTHER CALCULATIONS
Ideal Body Weight: 110 lbs / 50 kg +/-10%   Weight history, per HIE: (10/9 dosing) 107.1 lbs / 48.6 kg, (8/4) 104 lbs, (6/19) 108 lbs, (5/29) 103 lbs, (1/20) 99 lbs.

## 2023-11-10 NOTE — PHYSICAL THERAPY INITIAL EVALUATION ADULT - PERTINENT HX OF CURRENT PROBLEM, REHAB EVAL
Pt is a 53 year old female with a past medical history of metastatic NET s/p resection presenting for 2 month history of FTT, LE swelling , and ascites s/p starting Evorlimus . These symptoms are constant, the LE swelling worsened with ambulation and weight bearing. This is associated with nausea and mild stomach pain.  No fevers, chills, melena, dysphagia, or hematochezia. No history of biliary dx, nephrolithiasis, or chronic NSAID use. CT AP concerning for "interval progression of disease with enlarging liver metastasis, peritoneal carcinomatosis and omental caking. New right iliac bone sclerosis concerning for bony metastasis. New large volume of abdominopelvic ascites.  Nonspecific duodenitis and enterocolitis.  Consider post therapeutic change, peptic ulcer disease.Focal thickening of the rectum without upstream dilatation.  The differential diagnosis includes focal peristalsis, subserosal metastatic implant or colonic neoplasm." Ascites tap notable for [id number] rbcs and 70 nucleated cells.

## 2023-11-10 NOTE — PROGRESS NOTE ADULT - PROBLEM SELECTOR PLAN 1
SAAG of 2.9- Cirrosis vs HF vs Hepatic Metastatic dx vs Budd Chairi  -U/S to evaluate for BC   -U/S or Fibroscan to evaluate for cirrosis   -IV lasix 20  -procedure team consulted for therapeutic paracentesis SAAG of 2.9  -U/S to evaluate for BC -> Likely due to BCS    -IV lasix 20  -procedure team consulted for therapeutic paracentesis-> Will defer till later when pt leaves

## 2023-11-10 NOTE — PHYSICAL THERAPY INITIAL EVALUATION ADULT - ADDITIONAL COMMENTS
Pt lives with her son in a house with 1 steps to enter and a flight up to bed and bathroom. Pt did not use an assistive device and was independent with ADLs prior. Pt reports no falls in the past 6 months.   Pt left semi supine in bed in NAD, call nazario in reach and JASON Alexander made aware.

## 2023-11-10 NOTE — CONSULT NOTE ADULT - ASSESSMENT
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     Afebrile  Leukocytosis 15-->13    Workup:  -CT AP w/IV cont (11/8):  Interval progression of disease with enlarging liver metastasis, peritoneal carcinomatosis and omental caking. New right iliac bone sclerosis concerning for bony metastasis.  New large volume of abdominopelvic ascites.  Patent main portal vein.  Nonspecific duodenitis and enterocolitis.  Consider posttherapeutic change, peptic ulcer disease.  Focal thickening of the rectum without upstream dilatation.  The differential diagnosis includes focal peristalsis, subserosal metastatic implant or colonic neoplasm.    -Paracentesis 11/9: WBC 70, neutrophils 37%, RBC 22506  -Peritoneal Cx: NG    -Blood Cx (11/9): NGTD  -GI PCR + Vibrio   -Stool Cx in process     Antimicrobials:   Ceftriaxone 2g X 1 on 11/8  Azithromycin 11/9    #Enterocolitis, GI PCR + Vibrio   #Metastatic NET with progression of disease, worsening liver, bone and peritoneal carcinomatosis   #Leukocytosis       Recommendations:         PT TO BE SEEN. PRELIM NOTE  PENDING RECS. PLEASE WAIT FOR FINAL RECS AFTER DISCUSSION WITH ATTENDING#           53 year old Female with PMHx of metastatic NET s/p resection (s/p starting Everolimus presented on 11/9 for 2 months history of FTT, LE swelling, and ascites.     Afebrile  Leukocytosis 15-->13    Workup:  -CT AP w/IV cont (11/8):  Interval progression of disease with enlarging liver metastasis, peritoneal carcinomatosis and omental caking. New right iliac bone sclerosis concerning for bony metastasis.  New large volume of abdominopelvic ascites.  Patent main portal vein.  Nonspecific duodenitis and enterocolitis.  Consider posttherapeutic change, peptic ulcer disease.  Focal thickening of the rectum without upstream dilatation.  The differential diagnosis includes focal peristalsis, subserosal metastatic implant or colonic neoplasm.    -Paracentesis 11/9: WBC 70, neutrophils 37%, RBC 62212  -Peritoneal Cx: NG    -Blood Cx (11/9): NGTD  -GI PCR + Vibrio   -Stool Cx in process     Antimicrobials:   Ceftriaxone 2g X 1 on 11/8  Azithromycin 11/9    #Enterocolitis, chronic diarrhea, GI PCR + Vibrio, s/p 1 dose of ceftriaxone and Azithromycin   #Leukocytosis   #Metastatic NET with progression of disease, worsening liver, bone and peritoneal carcinomatosis, off chemo now   #Ascites s/p paracentesis with Peritoneal fluid not consistent with SBP  #Transaminitis, hyperbilirubinemia, elevated alk phos     Patient with chronic diarrhea for few months ( since July), having 5-6 watery/loose BM daily  Travel to [place] in September with persistent diarrhea  Eats cooked meals, including cooked fish   Denied any raw sea food, no oysters, no unpasteurized milk/cheese  No street food or restaurants   No animals exposure, no pets    Recommendations:   -Monitor off antibiotics   -Adequate hydration   -F/up stool Cx   -Trend LFTs  -Monitor T curve and WBC trend     Seen and discussed with Dr Ross Napier MD, PGY5  ID fellow  [employer] Teams Preferred  After 5pm/weekends call [phone]

## 2023-11-10 NOTE — PROVIDER CONTACT NOTE (CRITICAL VALUE NOTIFICATION) - ASSESSMENT
Pt alert, responsive, afebrile. No diarrhea noted. Complains of abdominal pressure secondary to ascites

## 2023-11-10 NOTE — PHYSICAL THERAPY INITIAL EVALUATION ADULT - LEVEL OF INDEPENDENCE: SUPINE/SIT, REHAB EVAL
pt reported her legs "feel heavy" required assistance with swing legs in and out of bed/minimum assist (75% patients effort)

## 2023-11-10 NOTE — PROGRESS NOTE ADULT - PROBLEM SELECTOR PLAN 2
-Hold loperamide  -GI pcr, stool cultures, stool O/P -Hold loperamide  -GI pcr, stool cultures, stool O/P-> Vibrio Cholera gave pt Azihtromycin( ID consulted would appreciate recs)

## 2023-11-10 NOTE — CONSULT NOTE ADULT - SUBJECTIVE AND OBJECTIVE BOX
HPI:    53 year old Female with PMHx of metastatic NET s/p resection (s/p starting Everlimus) presented on 11/9 for 2 months history of FTT, LE swelling, and ascites. These symptoms are constant, the LE swelling worsened with ambulation and weight bearing. This is associated with nausea and mild stomach pain.  No fevers, chills, melena, dysphagia, or hematochezia. No history of biliary dx, nephrolithiasis, or chronic NSAID use.     In ED VS wnl. In ED labs/imaging notable for WBC 15k, Hgb 11, , ALP 1423, AST 66, and ALT 47. CT AP concerning for "interval progression of disease with enlarging liver metastasis, peritoneal carcinomatosis and omental caking. New right iliac bone sclerosis concerning for bony metastasis. New large volume of abdominopelvic ascites.  Nonspecific duodenitis and enterocolitis.  Consider post therapeutic change, peptic ulcer disease. Focal thickening of the rectum without upstream dilatation.  The differential diagnosis includes focal peristalsis, subserosal metastatic implant or colonic neoplasm." Ascites tap notable for 47373 rbcs and 70 nucleated cells.         REVIEW OF SYSTEMS  [  ] ROS unobtainable because:    [  ] All other systems negative except as noted below    Constitutional:  [ ] fever [ ] chills  [ ] weight loss  [ ]night sweat  [ ]poor appetite/PO intake [ ]fatigue   Skin:  [ ] rash [ ] phlebitis	  Eyes: [ ] icterus [ ] pain  [ ] discharge	  ENMT: [ ] sore throat  [ ] thrush [ ] ulcers [ ] exudates [ ]anosmia  Respiratory: [ ] dyspnea [ ] hemoptysis [ ] cough [ ] sputum	  Cardiovascular:  [ ] chest pain [ ] palpitations [ ] edema	  Gastrointestinal:  [ ] nausea [ ] vomiting [ ] diarrhea [ ] constipation [ ] pain	  Genitourinary:  [ ] dysuria [ ] frequency [ ] hematuria [ ] discharge [ ] flank pain  [ ] incontinence  Musculoskeletal:  [ ] myalgias [ ] arthralgias [ ] arthritis  [ ] back pain  Neurological:  [ ] headache [ ] weakness [ ] seizures  [ ] confusion/altered mental status    prior hospital charts reviewed [V]  primary team notes reviewed [V]  other consultant notes reviewed [V]    PAST MEDICAL & SURGICAL HISTORY:  Vertigo    Neoplasm of digestive system    Malignant neoplasm of small intestine    Anemia    S/P laparoscopy  S/P Excsion left retroperitoneal mass 02/3/2014    H/O resection of small bowel  2014    SOCIAL HISTORY:  - Denied smoking/vaping/alcohol/recreational drug use    FAMILY HISTORY:  No pertinent family history in first degree relatives    Allergies  No Known Allergies    ANTIMICROBIALS:    ANTIMICROBIALS (past 90 days):  MEDICATIONS  (STANDING):  azithromycin   Tablet   1000 milliGRAM(s) Oral (11-09-23 @ 19:29)    cefTRIAXone   IVPB   100 mL/Hr IV Intermittent (11-08-23 @ 23:14)    OTHER MEDS:   MEDICATIONS  (STANDING):  acetaminophen     Tablet .. 650 every 6 hours PRN  enoxaparin Injectable 40 every 24 hours  furosemide    Tablet 20 daily  influenza   Vaccine 0.5 once  melatonin 3 at bedtime PRN  oxyCODONE    IR 10 every 6 hours PRN  oxyCODONE    IR 5 every 6 hours PRN    VITALS:  Vital Signs Last 24 Hrs  T(F): 98.9 (11-10-23 @ 05:11), Max: 99.5 (11-08-23 @ 18:56)    Vital Signs Last 24 Hrs  HR: 68 (11-10-23 @ 05:11) (68 - 75)  BP: 124/67 (11-10-23 @ 05:11) (111/62 - 124/67)  RR: 18 (11-10-23 @ 05:11)  SpO2: 100% (11-10-23 @ 05:11) (100% - 100%)  Wt(kg): --    EXAM:  Physical Exam:  Constitutional:  well preserved, comfortable  Head/Eyes: no icterus, PERRL, EOMI  ENT:  supple; no thrush  LUNGS:  CTA  CVS:  normal S1, S2, no murmur  Abd:  soft, non-tender; non-distended  Ext:  no edema  Vascular:  IV site no erythema tenderness or discharge  MSK:  joints without swelling  Neuro: AAO X 3, non- focal    Labs:                        10.2   13.97 )-----------( 568      ( 10 Nov 2023 05:35 )             30.3     11-10    138  |  99  |  12  ----------------------------<  89  4.1   |  28  |  0.39<L>    Ca    8.5      10 Nov 2023 05:35  Phos  2.7     11-10  Mg     2.10     11-10    TPro  6.9  /  Alb  3.3  /  TBili  1.4<H>  /  DBili  x   /  AST  66<H>  /  ALT  47<H>  /  AlkPhos  1423<H>  11-08    WBC Trend:  WBC Count: 13.97 (11-10-23 @ 05:35)  WBC Count: 15.78 (11-08-23 @ 18:56)    Auto Neutrophil #: 13.73 K/uL (11-08-23 @ 18:56)  Auto Neutrophil #: 6.57 K/uL (10-30-23 @ 16:52)  Auto Neutrophil #: 7.16 K/uL (09-29-23 @ 15:46)  Auto Neutrophil #: 4.42 K/uL (08-29-23 @ 16:42)  Auto Neutrophil #: 5.28 K/uL (08-04-23 @ 13:19)    Creatine Trend:  Creatinine: 0.39 (11-10)  Creatinine: 0.46 (11-08)    Liver Biochemical Testing Trend:  Alanine Aminotransferase (ALT/SGPT): 47 *H* (11-08)  Alanine Aminotransferase (ALT/SGPT): 39 (08-29)  Aspartate Aminotransferase (AST/SGOT): 66 (11-08-23 @ 18:56)  Aspartate Aminotransferase (AST/SGOT): 100 (08-29-23 @ 16:42)  Bilirubin Total: 1.4 (11-08)  Bilirubin Total: 0.6 (08-29)    Trend LDH    Auto Eosinophil %: 0.0 % (11-08-23 @ 18:56)  Urinalysis Basic - ( 10 Nov 2023 05:35 )    Color: x / Appearance: x / SG: x / pH: x  Gluc: 89 mg/dL / Ketone: x  / Bili: x / Urobili: x   Blood: x / Protein: x / Nitrite: x   Leuk Esterase: x / RBC: x / WBC x   Sq Epi: x / Non Sq Epi: x / Bacteria: x    MICROBIOLOGY:    Culture - Body Fluid with Gram Stain (collected 09 Nov 2023 02:44)  Source: Ascites Fl Ascites Fluid  Preliminary Report:    No growth to date    Culture - Urine (collected 08 Nov 2023 20:08)  Source: Clean Catch Clean Catch (Midstream)  Final Report:    <10,000 CFU/mL Normal Urogenital Neelam    Culture - Blood (collected 08 Nov 2023 18:56)  Source: .Blood Blood-Peripheral  Preliminary Report:    No growth at 24 hours    Culture - Blood (collected 08 Nov 2023 18:30)  Source: .Blood Blood-Peripheral  Preliminary Report:    No growth at 24 hours    Culture - Urine (collected 13 Jun 2022 16:24)  Source: Clean Catch Clean Catch (Midstream)  Final Report:    No growth    Culture - Blood (collected 13 Jun 2022 13:40)  Source: .Blood Blood-Peripheral  Final Report:    No Growth Final    Culture - Blood (collected 13 Jun 2022 13:30)  Source: .Blood Blood-Peripheral  Final Report:    No Growth Final    HIV-1/2 Combo Result: Nonreact (11-10-23 @ 05:35)    Troponin T, High Sensitivity Result: 6 (11-08)    Blood Gas Venous - Lactate: 2.2 (11-08 @ 18:56)    RADIOLOGY:  imaging below personally reviewed    < from: CT Abdomen and Pelvis w/ IV Cont (11.08.23 @ 22:11) >  IMPRESSION:  Compared to the prior study of 6/13/22, interval progression of disease   with enlarging liver metastasis, peritoneal carcinomatosis and omental   caking. New right iliac bone sclerosis concerning for bony metastasis.    New large volume of abdominopelvic ascites.  Patent main portal vein.    Nonspecific duodenitis and enterocolitis.  Consider posttherapeutic   change, peptic ulcer disease.    Focal thickening of the rectum without upstream dilatation.  The   differential diagnosis includes focal peristalsis, subserosal metastatic   implant or colonic neoplasm.    < end of copied text >             HPI:    53 year old Female with PMHx of metastatic NET s/p resection (s/p starting Everolimus presented on 11/9 for 2 months history of FTT, LE swelling, and ascites. These symptoms are constant, the LE swelling worsened with ambulation and weight bearing. This is associated with nausea and mild stomach pain.  No fevers, chills, melena, dysphagia, or hematochezia. No history of biliary dx, nephrolithiasis, or chronic NSAID use.     In ED VS wnl. In ED labs/imaging notable for WBC 15k, Hgb 11, , ALP 1423, AST 66, and ALT 47. CT AP concerning for "interval progression of disease with enlarging liver metastasis, peritoneal carcinomatosis and omental caking. New right iliac bone sclerosis concerning for bony metastasis. New large volume of abdominopelvic ascites.  Nonspecific duodenitis and enterocolitis.  Consider post therapeutic change, peptic ulcer disease. Focal thickening of the rectum without upstream dilatation.  The differential diagnosis includes focal peristalsis, subserosal metastatic implant or colonic neoplasm." Ascites tap notable for [id number] rbcs and 70 nucleated cells.      ID consulted for + GI PCR for Vibrio.      REVIEW OF SYSTEMS  [  ] ROS unobtainable because:    [X] All other systems negative except as noted below    Constitutional:  [ ] fever [ ] chills  [ ] weight loss  [ ]night sweat  [X]poor appetite/PO intake [ ]fatigue   Skin:  [ ] rash [ ] phlebitis	  Eyes: [ ] icterus [ ] pain  [ ] discharge	  ENMT: [ ] sore throat  [ ] thrush [ ] ulcers [ ] exudates [ ]anosmia  Respiratory: [ ] dyspnea [ ] hemoptysis [ ] cough [ ] sputum	  Cardiovascular:  [ ] chest pain [ ] palpitations [ ] edema	  Gastrointestinal:  [ ] nausea [ ] vomiting [X] diarrhea [ ] constipation [X] pain	  Genitourinary:  [ ] dysuria [ ] frequency [ ] hematuria [ ] discharge [ ] flank pain  [ ] incontinence  Musculoskeletal:  [ ] myalgias [ ] arthralgias [ ] arthritis  [ ] back pain  Neurological:  [ ] headache [X] weakness [ ] seizures  [ ] confusion/altered mental status    prior hospital charts reviewed [V]  primary team notes reviewed [V]  other consultant notes reviewed [V]    PAST MEDICAL & SURGICAL HISTORY:  Vertigo    Neoplasm of digestive system    Malignant neoplasm of small intestine    Anemia    S/P laparoscopy  S/P Excsion left retroperitoneal mass 02/3/2014    H/O resection of small bowel  2014    SOCIAL HISTORY:  - Denied smoking/vaping/alcohol/recreational drug use    FAMILY HISTORY:  No pertinent family history in first degree relatives    Allergies  No Known Allergies    ANTIMICROBIALS:    ANTIMICROBIALS (past 90 days):  MEDICATIONS  (STANDING):  azithromycin   Tablet   1000 milliGRAM(s) Oral (11-09-23 @ 19:29)    cefTRIAXone   IVPB   100 mL/Hr IV Intermittent (11-08-23 @ 23:14)    OTHER MEDS:   MEDICATIONS  (STANDING):  acetaminophen     Tablet .. 650 every 6 hours PRN  enoxaparin Injectable 40 every 24 hours  furosemide    Tablet 20 daily  influenza   Vaccine 0.5 once  melatonin 3 at bedtime PRN  oxyCODONE    IR 10 every 6 hours PRN  oxyCODONE    IR 5 every 6 hours PRN    VITALS:  Vital Signs Last 24 Hrs  T(F): 98.9 (11-10-23 @ 05:11), Max: 99.5 (11-08-23 @ 18:56)    Vital Signs Last 24 Hrs  HR: 68 (11-10-23 @ 05:11) (68 - 75)  BP: 124/67 (11-10-23 @ 05:11) (111/62 - 124/67)  RR: 18 (11-10-23 @ 05:11)  SpO2: 100% (11-10-23 @ 05:11) (100% - 100%)  Wt(kg): --    EXAM:  Physical Exam:  Constitutional:  weak, cachectic   Head/Eyes: no icterus, PERRL, EOMI  ENT:  supple; no thrush  LUNGS:  CTA  CVS:  normal S1, S2, no murmur  Abd:  soft, distended, dullness, tender around paracentesis site, bag draining peritoneal fluid   Ext:  b/l pitting edema   Vascular:  IV site no erythema tenderness or discharge  MSK:  joints without swelling  Neuro: AAO X 3, generalized weakness    Labs:                        10.2   13.97 )-----------( 568      ( 10 Nov 2023 05:35 )             30.3     11-10    138  |  99  |  12  ----------------------------<  89  4.1   |  28  |  0.39<L>    Ca    8.5      10 Nov 2023 05:35  Phos  2.7     11-10  Mg     2.10     11-10    TPro  6.9  /  Alb  3.3  /  TBili  1.4<H>  /  DBili  x   /  AST  66<H>  /  ALT  47<H>  /  AlkPhos  1423<H>  11-08    WBC Trend:  WBC Count: 13.97 (11-10-23 @ 05:35)  WBC Count: 15.78 (11-08-23 @ 18:56)    Auto Neutrophil #: 13.73 K/uL (11-08-23 @ 18:56)  Auto Neutrophil #: 6.57 K/uL (10-30-23 @ 16:52)  Auto Neutrophil #: 7.16 K/uL (09-29-23 @ 15:46)  Auto Neutrophil #: 4.42 K/uL (08-29-23 @ 16:42)  Auto Neutrophil #: 5.28 K/uL (08-04-23 @ 13:19)    Creatine Trend:  Creatinine: 0.39 (11-10)  Creatinine: 0.46 (11-08)    Liver Biochemical Testing Trend:  Alanine Aminotransferase (ALT/SGPT): 47 *H* (11-08)  Alanine Aminotransferase (ALT/SGPT): 39 (08-29)  Aspartate Aminotransferase (AST/SGOT): 66 (11-08-23 @ 18:56)  Aspartate Aminotransferase (AST/SGOT): 100 (08-29-23 @ 16:42)  Bilirubin Total: 1.4 (11-08)  Bilirubin Total: 0.6 (08-29)    Trend LDH    Auto Eosinophil %: 0.0 % (11-08-23 @ 18:56)  Urinalysis Basic - ( 10 Nov 2023 05:35 )    Color: x / Appearance: x / SG: x / pH: x  Gluc: 89 mg/dL / Ketone: x  / Bili: x / Urobili: x   Blood: x / Protein: x / Nitrite: x   Leuk Esterase: x / RBC: x / WBC x   Sq Epi: x / Non Sq Epi: x / Bacteria: x    MICROBIOLOGY:    Culture - Body Fluid with Gram Stain (collected 09 Nov 2023 02:44)  Source: Ascites Fl Ascites Fluid  Preliminary Report:    No growth to date    Culture - Urine (collected 08 Nov 2023 20:08)  Source: Clean Catch Clean Catch (Midstream)  Final Report:    <10,000 CFU/mL Normal Urogenital Neelam    Culture - Blood (collected 08 Nov 2023 18:56)  Source: .Blood Blood-Peripheral  Preliminary Report:    No growth at 24 hours    Culture - Blood (collected 08 Nov 2023 18:30)  Source: .Blood Blood-Peripheral  Preliminary Report:    No growth at 24 hours    Culture - Urine (collected 13 Jun 2022 16:24)  Source: Clean Catch Clean Catch (Midstream)  Final Report:    No growth    Culture - Blood (collected 13 Jun 2022 13:40)  Source: .Blood Blood-Peripheral  Final Report:    No Growth Final    Culture - Blood (collected 13 Jun 2022 13:30)  Source: .Blood Blood-Peripheral  Final Report:    No Growth Final    HIV-1/2 Combo Result: Nonreact (11-10-23 @ 05:35)    Troponin T, High Sensitivity Result: 6 (11-08)    Blood Gas Venous - Lactate: 2.2 (11-08 @ 18:56)    RADIOLOGY:  imaging below personally reviewed    < from: CT Abdomen and Pelvis w/ IV Cont (11.08.23 @ 22:11) >  IMPRESSION:  Compared to the prior study of 6/13/22, interval progression of disease   with enlarging liver metastasis, peritoneal carcinomatosis and omental   caking. New right iliac bone sclerosis concerning for bony metastasis.    New large volume of abdominopelvic ascites.  Patent main portal vein.    Nonspecific duodenitis and enterocolitis.  Consider posttherapeutic   change, peptic ulcer disease.    Focal thickening of the rectum without upstream dilatation.  The   differential diagnosis includes focal peristalsis, subserosal metastatic   implant or colonic neoplasm.    < end of copied text >

## 2023-11-10 NOTE — PROGRESS NOTE ADULT - ASSESSMENT
Amairani Franklin is a 53 year old F PMH of metastatic NET s/p resection presenting for 2 month history of FTT, LE swelling , and ascites likely due to metastatic disease

## 2023-11-10 NOTE — DIETITIAN NUTRITION RISK NOTIFICATION - ADDITIONAL COMMENTS/DIETITIAN RECOMMENDATIONS
Please see Dietitian Initial Assessment for complete recommendations.  Evelyn Walker, SHELBY, CDN #[id number]  Also available on [employer] Teams

## 2023-11-10 NOTE — CONSULT NOTE ADULT - ATTENDING COMMENTS
This is a 52 y/o F w/ PMHx of metastatic NET s/p resection (on Everlimus) who is presenting to [facility] on 11/8/23 with FTT, LE swelling, ascites for 2 months. Pt noted to have WBC 15 on presentation, afebrile, diagnostic paracentesis done w/ no findings of SBP.   Given diarrhea, GI PCR was sent, positive for Vibrio species. ID requested for further recommendations.   Pt well appearing on exam, abdomen is distended and there is some drainage near site of paracentesis. No findings of cellulitis.   GI PCR includes V cholerae, since that was not detected, it is a non-cholera species. Most common species include parahaemolyticus, alginolyticus, and less likely vulnificus.     Given diarrhea has been chronic (since July), 4 episodes a day, loose/watery, this is not consistent with acute vibrio infection. Treatment is usually supportive, unless mod/severe disease.     Plan:   1. No further treatment at this time     Thank you for this consult. Inpatient ID consult team will sign off.    Further changes in lab values, imaging studies, or clinical status will not be known to ID inpatient consultants unless specifically communicated by primary team.    Herb Hawkins MD  Attending Physician  Division of Infectious Diseases  Department of Medicine    Please contact through MS Teams message.  Office: [phone] (after 5 PM or weekend)

## 2023-11-10 NOTE — PROGRESS NOTE ADULT - ATTENDING COMMENTS
Pt with diarrhea - found to have vibrio - will follow up with ID recs.  Increase lasix to 40mg po daily.

## 2023-11-10 NOTE — PROGRESS NOTE ADULT - PROBLEM SELECTOR PLAN 5
-Dr. Darlene Whitmore's pt, s/p evorlimus(held 10/30), weekly lamreotide, and 4 doses PRRT 177 Jyotsna dotatate  -Heme-onc consulted would appreciate recs  -TTE ordered

## 2023-11-10 NOTE — DIETITIAN INITIAL EVALUATION ADULT - ADD RECOMMEND
1) Recommend continue regular diet.  2) RDN to provide Orgain Vegan shake 1 PO daily (provides 220 kcal, 16 gm protein per 11oz serving) and Liquid Protein Supplement 1 PO 3x daily (provides 100 kcal, 15 gm protein per 30 mL serving).   3) Encouraged PO intake as tolerated. Obtained food preferences, will honor as able.

## 2023-11-11 NOTE — PROGRESS NOTE ADULT - PROBLEM SELECTOR PLAN 2
-Hold loperamide  -GI pcr, stool cultures, stool O/P-> Vibrio Cholera gave pt Azihtromycin( ID consulted would appreciate recs)

## 2023-11-11 NOTE — PROGRESS NOTE ADULT - PROBLEM SELECTOR PLAN 5
-Dr. Darlene Whitmore's pt, s/p evorlimus(held 10/30), weekly lamreotide, and 4 doses PRRT 177 Jyotsna dotatate  -Heme-onc consulted recs appreciated   -TTE ordered

## 2023-11-11 NOTE — PROGRESS NOTE ADULT - ATTENDING COMMENTS
Diarrhea at baseline - would increase Lasix to bid given LE swelling.  Plan for para early next week.

## 2023-11-11 NOTE — PROGRESS NOTE ADULT - PROBLEM SELECTOR PLAN 1
SAAG of 2.9  -U/S to evaluate for BC -> Likely due to BCS    -PO lasix 40  -procedure team consulted for therapeutic paracentesis-> Will defer till later when pt leaves SAAG of 2.9  -U/S to evaluate for BC -> Likely due to BCS    -PO lasix 40 BID   -procedure team consulted for therapeutic paracentesis-> Will defer till later when pt leaves

## 2023-11-11 NOTE — PROGRESS NOTE ADULT - SUBJECTIVE AND OBJECTIVE BOX
***************************************************************  Emanuel Huang, PGY1  Internal Medicine   Preferred contact via [employer] Teams   ***************************************************************    MARIANELA HOLM  53y  MRN: [id number]    Patient is a 53y old  Female who presents with a chief complaint of Other ascites     (10 Nov 2023 13:28)      Interval/Overnight Events: no events ON.     Subjective: Pt seen and examined at bedside. Denies fever, CP, SOB, abn pain, N/V, dysuria. Tolerating diet.      MEDICATIONS  (STANDING):  enoxaparin Injectable 40 milliGRAM(s) SubCutaneous every 24 hours  furosemide    Tablet 40 milliGRAM(s) Oral daily  influenza   Vaccine 0.5 milliLiter(s) IntraMuscular once    MEDICATIONS  (PRN):  acetaminophen     Tablet .. 650 milliGRAM(s) Oral every 6 hours PRN Temp greater or equal to 38C (100.4F), Mild Pain (1 - 3)  melatonin 3 milliGRAM(s) Oral at bedtime PRN Insomnia  oxyCODONE    IR 5 milliGRAM(s) Oral every 6 hours PRN Moderate Pain (4 - 6)  oxyCODONE    IR 10 milliGRAM(s) Oral every 6 hours PRN Severe Pain (7 - 10)      Objective:    Vitals: Vital Signs Last 24 Hrs  T(C): 36.9 (11-11-23 @ 05:01), Max: 36.9 (11-11-23 @ 05:01)  T(F): 98.5 (11-11-23 @ 05:01), Max: 98.5 (11-11-23 @ 05:01)  HR: 82 (11-11-23 @ 05:01) (69 - 82)  BP: 122/67 (11-11-23 @ 05:01) (120/65 - 136/72)  BP(mean): --  RR: 18 (11-11-23 @ 05:01) (17 - 18)  SpO2: 100% (11-11-23 @ 05:01) (100% - 100%)                I&O's Summary    10 Nov 2023 07:01  -  11 Nov 2023 07:00  --------------------------------------------------------  IN: 0 mL / OUT: 200 mL / NET: -200 mL        PHYSICAL EXAM:  GENERAL: NAD  HEAD:  Atraumatic, Normocephalic  EYES: EOMI, conjunctiva and sclera clear  CHEST/LUNG: Clear to auscultation bilaterally; No rales, rhonchi, wheezing, or rubs  HEART: Regular rate and rhythm; No murmurs, rubs, or gallops  ABDOMEN: Soft, Nontender, Nondistended;   SKIN: No rashes or lesions  NERVOUS SYSTEM:  Alert & Oriented X3, no focal deficits    LABS:                        10.2   13.97 )-----------( 568      ( 10 Nov 2023 05:35 )             30.3                         11.0   15.78 )-----------( 660      ( 08 Nov 2023 18:56 )             32.8     11-10    138  |  99  |  12  ----------------------------<  89  4.1   |  28  |  0.39<L>  11-08    136  |  96<L>  |  10  ----------------------------<  119<H>  4.1   |  29  |  0.46<L>    Ca    8.5      10 Nov 2023 05:35  Ca    9.0      08 Nov 2023 18:56  Phos  2.7     11-10  Mg     2.10     11-10    TPro  6.9  /  Alb  3.3  /  TBili  1.4<H>  /  DBili  x   /  AST  66<H>  /  ALT  47<H>  /  AlkPhos  1423<H>  11-08    CAPILLARY BLOOD GLUCOSE        PT/INR - ( 10 Nov 2023 05:35 )   PT: 19.8 sec;   INR: 1.78 ratio             Urinalysis Basic - ( 10 Nov 2023 05:35 )    Color: x / Appearance: x / SG: x / pH: x  Gluc: 89 mg/dL / Ketone: x  / Bili: x / Urobili: x   Blood: x / Protein: x / Nitrite: x   Leuk Esterase: x / RBC: x / WBC x   Sq Epi: x / Non Sq Epi: x / Bacteria: x          RADIOLOGY & ADDITIONAL TESTS:    Imaging Personally Reviewed:  [x ] YES  [ ] NO    Consultants involved in case:   Consultant(s) Notes Reviewed:  [ x] YES  [ ] NO:   Care Discussed with Consultants/Other Providers [x ] YES  [ ] NO         ***************************************************************  Emanuel Huang, PGY1  Internal Medicine   Preferred contact via [employer] Teams   ***************************************************************    MARIANELA HOLM  53y  MRN: [id number]    Patient is a 53y old  Female who presents with a chief complaint of Other ascites     (10 Nov 2023 13:28)      Interval/Overnight Events: no events ON.     Subjective: Pt seen and examined at bedside. Pt resting calmly had mild abdominal pain  MEDICATIONS  (STANDING):  enoxaparin Injectable 40 milliGRAM(s) SubCutaneous every 24 hours  furosemide    Tablet 40 milliGRAM(s) Oral daily  influenza   Vaccine 0.5 milliLiter(s) IntraMuscular once    MEDICATIONS  (PRN):  acetaminophen     Tablet .. 650 milliGRAM(s) Oral every 6 hours PRN Temp greater or equal to 38C (100.4F), Mild Pain (1 - 3)  melatonin 3 milliGRAM(s) Oral at bedtime PRN Insomnia  oxyCODONE    IR 5 milliGRAM(s) Oral every 6 hours PRN Moderate Pain (4 - 6)  oxyCODONE    IR 10 milliGRAM(s) Oral every 6 hours PRN Severe Pain (7 - 10)      Objective:    Vitals: Vital Signs Last 24 Hrs  T(C): 36.9 (11-11-23 @ 05:01), Max: 36.9 (11-11-23 @ 05:01)  T(F): 98.5 (11-11-23 @ 05:01), Max: 98.5 (11-11-23 @ 05:01)  HR: 82 (11-11-23 @ 05:01) (69 - 82)  BP: 122/67 (11-11-23 @ 05:01) (120/65 - 136/72)  BP(mean): --  RR: 18 (11-11-23 @ 05:01) (17 - 18)  SpO2: 100% (11-11-23 @ 05:01) (100% - 100%)                I&O's Summary    10 Nov 2023 07:01  -  11 Nov 2023 07:00  --------------------------------------------------------  IN: 0 mL / OUT: 200 mL / NET: -200 mL        PHYSICAL EXAM:  GENERAL: NAD  HEAD:  Atraumatic, Normocephalic  EYES: EOMI, conjunctiva and sclera clear  CHEST/LUNG: Clear to auscultation bilaterally; No rales, rhonchi, wheezing, or rubs  HEART: Regular rate and rhythm; No murmurs, rubs, or gallops  ABDOMEN: Ascites noted   SKIN: No rashes or lesions  NERVOUS SYSTEM:  Alert & Oriented X3, no focal deficits    LABS:                        10.2   13.97 )-----------( 568      ( 10 Nov 2023 05:35 )             30.3                         11.0   15.78 )-----------( 660      ( 08 Nov 2023 18:56 )             32.8     11-10    138  |  99  |  12  ----------------------------<  89  4.1   |  28  |  0.39<L>  11-08    136  |  96<L>  |  10  ----------------------------<  119<H>  4.1   |  29  |  0.46<L>    Ca    8.5      10 Nov 2023 05:35  Ca    9.0      08 Nov 2023 18:56  Phos  2.7     11-10  Mg     2.10     11-10    TPro  6.9  /  Alb  3.3  /  TBili  1.4<H>  /  DBili  x   /  AST  66<H>  /  ALT  47<H>  /  AlkPhos  1423<H>  11-08    CAPILLARY BLOOD GLUCOSE        PT/INR - ( 10 Nov 2023 05:35 )   PT: 19.8 sec;   INR: 1.78 ratio             Urinalysis Basic - ( 10 Nov 2023 05:35 )    Color: x / Appearance: x / SG: x / pH: x  Gluc: 89 mg/dL / Ketone: x  / Bili: x / Urobili: x   Blood: x / Protein: x / Nitrite: x   Leuk Esterase: x / RBC: x / WBC x   Sq Epi: x / Non Sq Epi: x / Bacteria: x          RADIOLOGY & ADDITIONAL TESTS:    Imaging Personally Reviewed:  [x ] YES  [ ] NO    Consultants involved in case:   Consultant(s) Notes Reviewed:  [ x] YES  [ ] NO:   Care Discussed with Consultants/Other Providers [x ] YES  [ ] NO         ***************************************************************  Emanuel Huang, PGY1  Internal Medicine   Preferred contact via [employer] Teams   ***************************************************************    MARIANELA OHLM  53y  MRN: [id number]    Patient is a 53y old  Female who presents with a chief complaint of  ascites     (10 Nov 2023 13:28)      Interval/Overnight Events: no events ON.     Subjective: Pt seen and examined at bedside. Pt resting calmly had mild abdominal pain  MEDICATIONS  (STANDING):  enoxaparin Injectable 40 milliGRAM(s) SubCutaneous every 24 hours  furosemide    Tablet 40 milliGRAM(s) Oral daily  influenza   Vaccine 0.5 milliLiter(s) IntraMuscular once    MEDICATIONS  (PRN):  acetaminophen     Tablet .. 650 milliGRAM(s) Oral every 6 hours PRN Temp greater or equal to 38C (100.4F), Mild Pain (1 - 3)  melatonin 3 milliGRAM(s) Oral at bedtime PRN Insomnia  oxyCODONE    IR 5 milliGRAM(s) Oral every 6 hours PRN Moderate Pain (4 - 6)  oxyCODONE    IR 10 milliGRAM(s) Oral every 6 hours PRN Severe Pain (7 - 10)      Objective:    Vitals: Vital Signs Last 24 Hrs  T(C): 36.9 (11-11-23 @ 05:01), Max: 36.9 (11-11-23 @ 05:01)  T(F): 98.5 (11-11-23 @ 05:01), Max: 98.5 (11-11-23 @ 05:01)  HR: 82 (11-11-23 @ 05:01) (69 - 82)  BP: 122/67 (11-11-23 @ 05:01) (120/65 - 136/72)  BP(mean): --  RR: 18 (11-11-23 @ 05:01) (17 - 18)  SpO2: 100% (11-11-23 @ 05:01) (100% - 100%)                I&O's Summary    10 Nov 2023 07:01  -  11 Nov 2023 07:00  --------------------------------------------------------  IN: 0 mL / OUT: 200 mL / NET: -200 mL        PHYSICAL EXAM:  GENERAL: NAD  HEAD:  Atraumatic, Normocephalic  EYES: EOMI, conjunctiva and sclera clear  CHEST/LUNG: Clear to auscultation bilaterally; No rales, rhonchi, wheezing, or rubs  HEART: Regular rate and rhythm; No murmurs, rubs, or gallops  ABDOMEN: Ascites noted   SKIN: No rashes or lesions  NERVOUS SYSTEM:  Alert & Oriented X3, no focal deficits    LABS:                        10.2   13.97 )-----------( 568      ( 10 Nov 2023 05:35 )             30.3                         11.0   15.78 )-----------( 660      ( 08 Nov 2023 18:56 )             32.8     11-10    138  |  99  |  12  ----------------------------<  89  4.1   |  28  |  0.39<L>  11-08    136  |  96<L>  |  10  ----------------------------<  119<H>  4.1   |  29  |  0.46<L>    Ca    8.5      10 Nov 2023 05:35  Ca    9.0      08 Nov 2023 18:56  Phos  2.7     11-10  Mg     2.10     11-10    TPro  6.9  /  Alb  3.3  /  TBili  1.4<H>  /  DBili  x   /  AST  66<H>  /  ALT  47<H>  /  AlkPhos  1423<H>  11-08    CAPILLARY BLOOD GLUCOSE        PT/INR - ( 10 Nov 2023 05:35 )   PT: 19.8 sec;   INR: 1.78 ratio             Urinalysis Basic - ( 10 Nov 2023 05:35 )    Color: x / Appearance: x / SG: x / pH: x  Gluc: 89 mg/dL / Ketone: x  / Bili: x / Urobili: x   Blood: x / Protein: x / Nitrite: x   Leuk Esterase: x / RBC: x / WBC x   Sq Epi: x / Non Sq Epi: x / Bacteria: x          RADIOLOGY & ADDITIONAL TESTS:    Imaging Personally Reviewed:  [x ] YES  [ ] NO    Consultants involved in case:   Consultant(s) Notes Reviewed:  [ x] YES  [ ] NO:   Care Discussed with Consultants/Other Providers [x ] YES  [ ] NO

## 2023-11-12 NOTE — PROVIDER CONTACT NOTE (OTHER) - ASSESSMENT
Patient alert and oriented x 4. Unsteady ambulation and s/s of weakness. Collecting AM labs asap Affect and characteristics of appearance, verbalizations, behaviors are appropriate details…

## 2023-11-12 NOTE — PROGRESS NOTE ADULT - PROBLEM SELECTOR PLAN 3
-Consulted PT and nutrition appreciated recs -Hold loperamide  -GI pcr, stool cultures, stool O/P-> Vibrio Cholera gave pt Azihtromycin( ID consulted would appreciate recs)

## 2023-11-12 NOTE — PROGRESS NOTE ADULT - ATTENDING COMMENTS
Pt noted to have a few episodes of bloody BMs - pt states blood streaks, but nursing reports melena.  GI eval.  Procedure team for para in am.

## 2023-11-12 NOTE — PROGRESS NOTE ADULT - SUBJECTIVE AND OBJECTIVE BOX
PROGRESS NOTE:   Authored by Leandro Zamora MD   Patient is a 53y old  Female who presents with a chief complaint of FTT/Ascites (11 Nov 2023 07:37)      SUBJECTIVE / OVERNIGHT EVENTS:  Bloody diarrhea this AM @ 6:45.    ADDITIONAL REVIEW OF SYSTEMS:    MEDICATIONS  (STANDING):  enoxaparin Injectable 40 milliGRAM(s) SubCutaneous every 24 hours  furosemide    Tablet 40 milliGRAM(s) Oral two times a day  influenza   Vaccine 0.5 milliLiter(s) IntraMuscular once    MEDICATIONS  (PRN):  acetaminophen     Tablet .. 650 milliGRAM(s) Oral every 6 hours PRN Temp greater or equal to 38C (100.4F), Mild Pain (1 - 3)  melatonin 3 milliGRAM(s) Oral at bedtime PRN Insomnia  oxyCODONE    IR 10 milliGRAM(s) Oral every 6 hours PRN Severe Pain (7 - 10)  oxyCODONE    IR 5 milliGRAM(s) Oral every 6 hours PRN Moderate Pain (4 - 6)      CAPILLARY BLOOD GLUCOSE        I&O's Summary    11 Nov 2023 07:01  -  12 Nov 2023 07:00  --------------------------------------------------------  IN: 250 mL / OUT: 300 mL / NET: -50 mL        PHYSICAL EXAM:  Vital Signs Last 24 Hrs  T(C): 36.9 (12 Nov 2023 05:28), Max: 37 (11 Nov 2023 14:52)  T(F): 98.4 (12 Nov 2023 05:28), Max: 98.6 (11 Nov 2023 14:52)  HR: 79 (12 Nov 2023 05:28) (79 - 80)  BP: 110/73 (12 Nov 2023 05:28) (110/73 - 119/70)  BP(mean): --  RR: 18 (12 Nov 2023 05:28) (18 - 18)  SpO2: 100% (12 Nov 2023 05:28) (100% - 100%)    Parameters below as of 12 Nov 2023 05:28  Patient On (Oxygen Delivery Method): room air        PHYSICAL EXAM:  GENERAL: NAD  HEAD:  Atraumatic, Normocephalic  EYES: EOMI, conjunctiva and sclera clear  CHEST/LUNG: Clear to auscultation bilaterally; No rales, rhonchi, wheezing, or rubs  HEART: Regular rate and rhythm; No murmurs, rubs, or gallops  ABDOMEN: Ascites noted   SKIN: No rashes or lesions  NERVOUS SYSTEM:  Alert & Oriented X3, no focal deficits    LABS:                        10.5   12.37 )-----------( 552      ( 11 Nov 2023 06:46 )             31.0     11-11    137  |  99  |  11  ----------------------------<  84  4.1   |  27  |  0.36<L>    Ca    8.4      11 Nov 2023 06:46  Phos  2.5     11-11  Mg     2.10     11-11            Urinalysis Basic - ( 11 Nov 2023 06:46 )    Color: x / Appearance: x / SG: x / pH: x  Gluc: 84 mg/dL / Ketone: x  / Bili: x / Urobili: x   Blood: x / Protein: x / Nitrite: x   Leuk Esterase: x / RBC: x / WBC x   Sq Epi: x / Non Sq Epi: x / Bacteria: x        Culture - Reflex Stool (collected 09 Nov 2023 11:57)  Source: .Stool  Final Report (11 Nov 2023 19:18):    Vibrio species negative by culture    Culture - Stool (collected 09 Nov 2023 11:57)  Source: .Stool Feces  Final Report (11 Nov 2023 17:22):    Few Yeast like cells    No enteric gram negative rods isolated    No enteric pathogens isolated.    (Stool culture examined for Salmonella,    Shigella, Campylobacter, Aeromonas, Plesiomonas,    Vibrio, E.coli O157 and Yersinia)        RADIOLOGY & ADDITIONAL TESTS:  Lab Results Reviewed   Imaging Reviewed  Electrocardiogram Reviewed   PROGRESS NOTE:   Authored by Leandro Zamora MD   Patient is a 53y old  Female who presents with a chief complaint of FTT/Ascites (11 Nov 2023 07:37)      SUBJECTIVE / OVERNIGHT EVENTS:  Bloody diarrhea this AM @ 6:45.  3x episodes of diarrhea, melena @ 9AM.     ADDITIONAL REVIEW OF SYSTEMS:    MEDICATIONS  (STANDING):  enoxaparin Injectable 40 milliGRAM(s) SubCutaneous every 24 hours  furosemide    Tablet 40 milliGRAM(s) Oral two times a day  influenza   Vaccine 0.5 milliLiter(s) IntraMuscular once    MEDICATIONS  (PRN):  acetaminophen     Tablet .. 650 milliGRAM(s) Oral every 6 hours PRN Temp greater or equal to 38C (100.4F), Mild Pain (1 - 3)  melatonin 3 milliGRAM(s) Oral at bedtime PRN Insomnia  oxyCODONE    IR 10 milliGRAM(s) Oral every 6 hours PRN Severe Pain (7 - 10)  oxyCODONE    IR 5 milliGRAM(s) Oral every 6 hours PRN Moderate Pain (4 - 6)      CAPILLARY BLOOD GLUCOSE        I&O's Summary    11 Nov 2023 07:01  -  12 Nov 2023 07:00  --------------------------------------------------------  IN: 250 mL / OUT: 300 mL / NET: -50 mL        PHYSICAL EXAM:  Vital Signs Last 24 Hrs  T(C): 36.9 (12 Nov 2023 05:28), Max: 37 (11 Nov 2023 14:52)  T(F): 98.4 (12 Nov 2023 05:28), Max: 98.6 (11 Nov 2023 14:52)  HR: 79 (12 Nov 2023 05:28) (79 - 80)  BP: 110/73 (12 Nov 2023 05:28) (110/73 - 119/70)  BP(mean): --  RR: 18 (12 Nov 2023 05:28) (18 - 18)  SpO2: 100% (12 Nov 2023 05:28) (100% - 100%)    Parameters below as of 12 Nov 2023 05:28  Patient On (Oxygen Delivery Method): room air        PHYSICAL EXAM:  GENERAL: NAD  HEAD:  Atraumatic, Normocephalic  EYES: EOMI, conjunctiva and sclera clear  CHEST/LUNG: Clear to auscultation bilaterally; No rales, rhonchi, wheezing, or rubs  HEART: Regular rate and rhythm; No murmurs, rubs, or gallops  ABDOMEN: Ascites noted   SKIN: No rashes or lesions  NERVOUS SYSTEM:  Alert & Oriented X3, no focal deficits    LABS:                        10.5   12.37 )-----------( 552      ( 11 Nov 2023 06:46 )             31.0     11-11    137  |  99  |  11  ----------------------------<  84  4.1   |  27  |  0.36<L>    Ca    8.4      11 Nov 2023 06:46  Phos  2.5     11-11  Mg     2.10     11-11            Urinalysis Basic - ( 11 Nov 2023 06:46 )    Color: x / Appearance: x / SG: x / pH: x  Gluc: 84 mg/dL / Ketone: x  / Bili: x / Urobili: x   Blood: x / Protein: x / Nitrite: x   Leuk Esterase: x / RBC: x / WBC x   Sq Epi: x / Non Sq Epi: x / Bacteria: x        Culture - Reflex Stool (collected 09 Nov 2023 11:57)  Source: .Stool  Final Report (11 Nov 2023 19:18):    Vibrio species negative by culture    Culture - Stool (collected 09 Nov 2023 11:57)  Source: .Stool Feces  Final Report (11 Nov 2023 17:22):    Few Yeast like cells    No enteric gram negative rods isolated    No enteric pathogens isolated.    (Stool culture examined for Salmonella,    Shigella, Campylobacter, Aeromonas, Plesiomonas,    Vibrio, E.coli O157 and Yersinia)        RADIOLOGY & ADDITIONAL TESTS:  Lab Results Reviewed   Imaging Reviewed  Electrocardiogram Reviewed

## 2023-11-12 NOTE — PROGRESS NOTE ADULT - PROBLEM SELECTOR PLAN 1
SAAG of 2.9  -U/S to evaluate for BC -> Likely due to BCS    -PO lasix 40 BID   -procedure team consulted for therapeutic paracentesis-> Will defer till later when pt leaves Melana/hematochezia 11/12/23 AM.  - GI consulted  - d/c lovenox 40 SQ  - Starting protonix 40 IV BID

## 2023-11-12 NOTE — PROGRESS NOTE ADULT - PROBLEM SELECTOR PLAN 5
-Dr. Darlene Whitmore's pt, s/p evorlimus(held 10/30), weekly lamreotide, and 4 doses PRRT 177 Jyotsna dotatate  -Heme-onc consulted recs appreciated   -TTE ordered Plan as above

## 2023-11-12 NOTE — PROGRESS NOTE ADULT - PROBLEM SELECTOR PLAN 6
Diet: Regular Diet  DVT-P: Hold for paracentesis   Dispo: Pending clinical improvement -Dr. Darlene Whitmore's pt, s/p evorlimus(held 10/30), weekly lamreotide, and 4 doses PRRT 177 Jyotsna dotatate  -Heme-onc consulted recs appreciated   -TTE ordered

## 2023-11-12 NOTE — PROGRESS NOTE ADULT - SUBJECTIVE AND OBJECTIVE BOX
MARIANELA [id number]  53yFemale  T(C): 36.9 (11-12-23 @ 05:28), Max: 37 (11-11-23 @ 14:52)  HR: 79 (11-12-23 @ 05:28) (79 - 80)  BP: 110/73 (11-12-23 @ 05:28) (110/73 - 119/70)  RR: 18 (11-12-23 @ 05:28) (18 - 18)  SpO2: 100% (11-12-23 @ 05:28) (100% - 100%)  Wt(kg): --  11-11 @ 07:01  -  11-12 @ 07:00  --------------------------------------------------------  IN: 250 mL / OUT: 350 mL / NET: -100 mL

## 2023-11-12 NOTE — PROGRESS NOTE ADULT - PROBLEM SELECTOR PLAN 2
-Hold loperamide  -GI pcr, stool cultures, stool O/P-> Vibrio Cholera gave pt Azihtromycin( ID consulted would appreciate recs) SAAG of 2.9  -U/S to evaluate for BC -> Likely due to BCS    -PO lasix 40 BID   -procedure team consulted for therapeutic paracentesis-> Will defer till later when pt leaves

## 2023-11-13 NOTE — PROGRESS NOTE ADULT - SUBJECTIVE AND OBJECTIVE BOX
Patient is a 53y Female     Patient is a 53y old  Female who presents with a chief complaint of FTT/Ascites (13 Nov 2023 08:06)      HPI:  Amairani Franklin is a 53 year old F PMH of metastatic NET s/p resection presenting for 2 month history of FTT, LE swelling , and ascites s/p starting Evorlimus . These symptoms are constant, the LE swelling worsened with ambulation and weight bearing. This is associated with nausea and mild stomach pain.  No fevers, chills, melena, dysphagia, or hematochezia. No history of biliary dx, nephrolithiasis, or chronic NSAID use.     In ED VS wnl. In ED labs/imaging notable for WBC 15k, Hgb 11, , ALP 1423, AST 66, and ALT 47. CT AP concerning for "interval progression of disease with enlarging liver metastasis, peritoneal carcinomatosis and omental caking. New right iliac bone sclerosis concerning for bony metastasis.New large volume of abdominopelvic ascites.  Nonspecific duodenitis and enterocolitis.  Consider post therapeutic change, peptic ulcer disease.Focal thickening of the rectum without upstream dilatation.  The differential diagnosis includes focal peristalsis, subserosal metastatic implant or colonic neoplasm." Ascites tap notable for 43580 rbcs and 70 nucleated cells.  (09 Nov 2023 08:30)      PAST MEDICAL & SURGICAL HISTORY:  Vertigo      Neoplasm of digestive system      Malignant neoplasm of small intestine      Anemia      S/P laparoscopy  S/P Excsion left retroperitoneal mass 02/3/2014      H/O resection of small bowel  2014          MEDICATIONS  (STANDING):  furosemide    Tablet 40 milliGRAM(s) Oral two times a day  influenza   Vaccine 0.5 milliLiter(s) IntraMuscular once  pantoprazole  Injectable 40 milliGRAM(s) IV Push every 12 hours  pantoprazole  Injectable      potassium phosphate / sodium phosphate Powder (PHOS-NaK) 1 Packet(s) Oral once      Allergies    No Known Allergies    Intolerances        SOCIAL HISTORY:  Denies ETOh,Smoking,     FAMILY HISTORY:  No pertinent family history in first degree relatives        REVIEW OF SYSTEMS:    CONSTITUTIONAL: No weakness, fevers or chills  EYES/ENT: No visual changes;  No vertigo or throat pain   NECK: No pain or stiffness  RESPIRATORY: No cough, wheezing, hemoptysis; No shortness of breath  CARDIOVASCULAR: No chest pain or palpitations  GASTROINTESTINAL: No abdominal or epigastric pain. No nausea, vomiting, or hematemesis; No diarrhea or constipation. No melena or hematochezia.  GENITOURINARY: No dysuria, frequency or hematuria  NEUROLOGICAL: No numbness or weakness  SKIN: No itching, burning, rashes, or lesions   All other review of systems is negative unless indicated above.    VITAL:  T(C): , Max: 37.2 (11-12-23 @ 21:54)  T(F): , Max: 98.9 (11-12-23 @ 21:54)  HR: 74 (11-13-23 @ 05:40)  BP: 104/66 (11-13-23 @ 05:40)  BP(mean): --  RR: 18 (11-13-23 @ 05:40)  SpO2: 100% (11-13-23 @ 05:40)  Wt(kg): --    I and O's:    11-11 @ 07:01  -  11-12 @ 07:00  --------------------------------------------------------  IN: 250 mL / OUT: 350 mL / NET: -100 mL    11-12 @ 07:01  -  11-13 @ 07:00  --------------------------------------------------------  IN: 1200 mL / OUT: 0 mL / NET: 1200 mL          PHYSICAL EXAM:    Constitutional: NAD  HEENT: PERRLA,   Neck: No JVD  Respiratory: CTA B/L  Cardiovascular: S1 and S2  Gastrointestinal: BS+, soft, NT/ND  Extremities: No peripheral edema  Neurological: A/O x 3, no focal deficits  Psychiatric: Normal mood, normal affect  : No Gerber  Skin: No rashes  Access: Not applicable  Back: No CVA tenderness    LABS:                        9.2    13.81 )-----------( 528      ( 13 Nov 2023 07:08 )             27.1     11-13    136  |  98  |  11  ----------------------------<  88  3.8   |  29  |  0.40<L>    Ca    8.2<L>      13 Nov 2023 07:08  Phos  2.4     11-13  Mg     2.00     11-13    TPro  5.9<L>  /  Alb  2.3<L>  /  TBili  1.3<H>  /  DBili  x   /  AST  48<H>  /  ALT  31  /  AlkPhos  1243<H>  11-13          RADIOLOGY & ADDITIONAL STUDIES:

## 2023-11-13 NOTE — PROGRESS NOTE ADULT - ATTENDING COMMENTS
54yo F with metastatic NET s/p resection with 2 months FTT, LE swelling, ascites. Admitted with Yersinia infection causing diarrhea, receiving supportive care. Over the weekend with melena, hemoglobin drop. GI following, on PPI – conservative management. No immediate indication for CTA abd/pelvis. Likely secondary to colitis. Trend Hb.    Abdomen distended. Procedure team to do paracentesis tomorrow. Possible dc afterward, maybe 11/15. 52yo F with metastatic NET s/p resection with 2 months FTT, LE swelling, ascites. Admitted with vibrio infection causing diarrhea, receiving supportive care. Over the weekend with melena, hemoglobin drop. GI following, on PPI – conservative management. No immediate indication for CTA abd/pelvis. Likely secondary to colitis. Trend Hb.    Abdomen distended. Procedure team to do paracentesis tomorrow. Possible dc afterward, maybe 11/15.

## 2023-11-13 NOTE — PROVIDER CONTACT NOTE (OTHER) - RECOMMENDATIONS
Leandro Zamora made aware via teams.
notify MD
Notify MD. Will try to change IV at a later time.
notify MD

## 2023-11-13 NOTE — PROGRESS NOTE ADULT - PROBLEM SELECTOR PLAN 3
-Hold loperamide  -GI pcr, stool cultures, stool O/P-> Vibrio Cholera gave pt Azihtromycin  - ID - monitor off abx

## 2023-11-13 NOTE — PROVIDER CONTACT NOTE (OTHER) - SITUATION
Pt refusing to change IV
Paracentesis site leaking fluid, dressing changed x2 since 8am
Pt had bloody diarrhea
Unable to obtain morning labs
Patient observed with loose bloody BM this shift
Site from recent thoracentesis leaking

## 2023-11-13 NOTE — PROGRESS NOTE ADULT - PROBLEM SELECTOR PLAN 1
Melana/hematochezia 11/12/23 AM.  - GI consulted  - d/c lovenox 40 SQ  - Starting protonix 40 IV BID

## 2023-11-13 NOTE — PROVIDER CONTACT NOTE (OTHER) - DATE AND TIME:
12-Nov-2023 07:55
10-Nov-2023 15:35
13-Nov-2023 11:21
12-Nov-2023 06:53
09-Nov-2023 11:05
12-Nov-2023 08:59

## 2023-11-13 NOTE — PROGRESS NOTE ADULT - PROBLEM SELECTOR PLAN 2
SAAG of 2.9  -U/S to evaluate for BC -> Likely due to BCS    -PO lasix 40 BID   -procedure team consulted for therapeutic paracentesis-> Will defer till later when pt leaves

## 2023-11-13 NOTE — PROGRESS NOTE ADULT - SUBJECTIVE AND OBJECTIVE BOX
Vicente Jones, PGY-1  Please contact on Teams    MARIANELA HOLM  53y  Female    Reason for Admission:     HPI:  Marianela Holm is a 53 year old F PMH of metastatic NET s/p resection presenting for 2 month history of FTT, LE swelling , and ascites s/p starting Evorlimus . These symptoms are constant, the LE swelling worsened with ambulation and weight bearing. This is associated with nausea and mild stomach pain.  No fevers, chills, melena, dysphagia, or hematochezia. No history of biliary dx, nephrolithiasis, or chronic NSAID use.     In ED VS wnl. In ED labs/imaging notable for WBC 15k, Hgb 11, , ALP 1423, AST 66, and ALT 47. CT AP concerning for "interval progression of disease with enlarging liver metastasis, peritoneal carcinomatosis and omental caking. New right iliac bone sclerosis concerning for bony metastasis.New large volume of abdominopelvic ascites.  Nonspecific duodenitis and enterocolitis.  Consider post therapeutic change, peptic ulcer disease.Focal thickening of the rectum without upstream dilatation.  The differential diagnosis includes focal peristalsis, subserosal metastatic implant or colonic neoplasm." Ascites tap notable for 64264 rbcs and 70 nucleated cells.  (09 Nov 2023 08:30)      SUBJECTIVE/INTERVAL EVENTS: Hematochezia yesterday. Hgb stable. Pt seen and examined at bedside.    Physical Exam:   GENERAL: NAD  HEAD:  Atraumatic, Normocephalic  EYES: EOMI, conjunctiva and sclera clear  CHEST/LUNG: Clear to auscultation bilaterally; No rales, rhonchi, wheezing, or rubs  HEART: Regular rate and rhythm; No murmurs, rubs, or gallops  ABDOMEN: Ascites noted   SKIN: No rashes or lesions  NERVOUS SYSTEM:  Alert & Oriented X3, no focal deficits      Vital Signs Last 24 Hrs  T(C): 36.5 (13 Nov 2023 05:40), Max: 37.2 (12 Nov 2023 21:54)  T(F): 97.7 (13 Nov 2023 05:40), Max: 98.9 (12 Nov 2023 21:54)  HR: 74 (13 Nov 2023 05:40) (74 - 81)  BP: 104/66 (13 Nov 2023 05:40) (104/66 - 114/72)  BP(mean): --  RR: 18 (13 Nov 2023 05:40) (18 - 18)  SpO2: 100% (13 Nov 2023 05:40) (100% - 100%)    Parameters below as of 13 Nov 2023 05:40  Patient On (Oxygen Delivery Method): room air          Consultant(s) Notes Reviewed:  [x] YES  [ ] NO  Care Discussed with Consultants/Other Providers [x] YES  [ ] NO    LABS:                        9.8    12.89 )-----------( 557      ( 13 Nov 2023 01:02 )             28.8                         9.0    14.20 )-----------( 534      ( 12 Nov 2023 09:23 )             26.2                         10.5   12.37 )-----------( 552      ( 11 Nov 2023 06:46 )             31.0                               134    |  96     |  10                  Calcium: 7.9   / iCa: x      (11-12 @ 09:23)    ----------------------------<  117       Magnesium: 1.90                             3.3     |  29     |  0.40             Phosphorous: 2.2        Urinalysis Basic - ( 12 Nov 2023 09:23 )    Color: x / Appearance: x / SG: x / pH: x  Gluc: 117 mg/dL / Ketone: x  / Bili: x / Urobili: x   Blood: x / Protein: x / Nitrite: x   Leuk Esterase: x / RBC: x / WBC x   Sq Epi: x / Non Sq Epi: x / Bacteria: x        RADIOLOGY & ADDITIONAL TESTS:    Imaging Personally Reviewed:  [x] YES  [ ] NO    MEDICATIONS  (STANDING):  furosemide    Tablet 40 milliGRAM(s) Oral two times a day  influenza   Vaccine 0.5 milliLiter(s) IntraMuscular once  pantoprazole  Injectable 40 milliGRAM(s) IV Push every 12 hours  pantoprazole  Injectable      potassium chloride   Powder 40 milliEquivalent(s) Oral once    MEDICATIONS  (PRN):  acetaminophen     Tablet .. 650 milliGRAM(s) Oral every 6 hours PRN Temp greater or equal to 38C (100.4F), Mild Pain (1 - 3)  melatonin 3 milliGRAM(s) Oral at bedtime PRN Insomnia  oxyCODONE    IR 5 milliGRAM(s) Oral every 6 hours PRN Moderate Pain (4 - 6)  oxyCODONE    IR 10 milliGRAM(s) Oral every 6 hours PRN Severe Pain (7 - 10)      HEALTH ISSUES - PROBLEM Dx:  Ascites    Prophylactic measure    FTT (failure to thrive) in adult    Bilateral lower extremity edema    Neuroendocrine tumor status post surgical treatment    Diarrheal disease    Melena

## 2023-11-13 NOTE — PROGRESS NOTE ADULT - ASSESSMENT
pt with leak after paracentesis  conservative gi magnemnt  rectal bleeding, high risk for colon  ? secondary to colitis  can consider ct if appopriate  will follow with you

## 2023-11-13 NOTE — PROVIDER CONTACT NOTE (OTHER) - ASSESSMENT
Pt refusing to change IV. Pt in no sxs of acute distress. Current IV is flushing well the site is asymptomatic Pt refusing to change IV. Pt in no sxs of acute distress. Current IV is flushing well the site is asymptomatic. Pt educated on risks of infection if IV not changed. Pt verbalized understanding.

## 2023-11-13 NOTE — PROVIDER CONTACT NOTE (OTHER) - REASON
Pt had bloody diarrhea
Pt refusing to change IV
Unable to obtain morning labs
Paracentesis site leaking fluid, dressing changed x2 since 8am
Site from recent thoracentesis leaking
No
Patient observed with loose bloody BM this shift

## 2023-11-13 NOTE — PROGRESS NOTE ADULT - ASSESSMENT
Amairani Franklin is a 53 year old F PMH of metastatic NET s/p resection presenting for 2 month history of FTT, LE swelling , and ascites likely due to metastatic disease. Diarrhea - vibrio+. Melena/hematochezia (differing reports) 11/12.

## 2023-11-13 NOTE — PROGRESS NOTE ADULT - PROBLEM SELECTOR PLAN 6
-Dr. Darlene Whitmore's pt, s/p evorlimus(held 10/30), weekly lamreotide, and 4 doses PRRT 177 Jyotsna dotatate  -Heme-onc consulted recs appreciated   -TTE ordered - normal RV/LV function

## 2023-11-14 NOTE — PROGRESS NOTE ADULT - SUBJECTIVE AND OBJECTIVE BOX
MARIANELA HOLM  53y  MRN: [id number]    Patient is a 53y old  Female who presents with a chief complaint of FTT/Ascites (14 Nov 2023 06:45)      Subjective: no events ON. Denies fever, CP, SOB, abn pain, N/V, dysuria. Tolerating diet.      MEDICATIONS  (STANDING):  furosemide    Tablet 40 milliGRAM(s) Oral two times a day  influenza   Vaccine 0.5 milliLiter(s) IntraMuscular once  pantoprazole  Injectable 40 milliGRAM(s) IV Push every 12 hours  pantoprazole  Injectable      potassium phosphate / sodium phosphate Powder (PHOS-NaK) 1 Packet(s) Oral once    MEDICATIONS  (PRN):  acetaminophen     Tablet .. 650 milliGRAM(s) Oral every 6 hours PRN Temp greater or equal to 38C (100.4F), Mild Pain (1 - 3)  melatonin 3 milliGRAM(s) Oral at bedtime PRN Insomnia  oxyCODONE    IR 10 milliGRAM(s) Oral every 6 hours PRN Severe Pain (7 - 10)  oxyCODONE    IR 5 milliGRAM(s) Oral every 6 hours PRN Moderate Pain (4 - 6)      Objective:    Vitals: Vital Signs Last 24 Hrs  T(C): 36.8 (11-14-23 @ 05:35), Max: 36.9 (11-13-23 @ 21:27)  T(F): 98.2 (11-14-23 @ 05:35), Max: 98.5 (11-13-23 @ 21:27)  HR: 81 (11-14-23 @ 05:35) (81 - 88)  BP: 123/56 (11-14-23 @ 05:35) (112/60 - 123/70)  BP(mean): --  RR: 16 (11-14-23 @ 05:35) (16 - 18)  SpO2: 100% (11-14-23 @ 05:35) (100% - 100%)            I&O's Summary      PHYSICAL EXAM:  GENERAL: NAD  HEAD:  Atraumatic, Normocephalic  EYES: EOMI, conjunctiva and sclera clear  CHEST/LUNG: Clear to auscultation bilaterally; No rales, rhonchi, wheezing, or rubs  HEART: Regular rate and rhythm; No murmurs, rubs, or gallops  ABDOMEN: Soft, Nontender, Nondistended;   SKIN: No rashes or lesions  NERVOUS SYSTEM:  Alert & Oriented X3, no focal deficits    LABS:  11-14    132<L>  |  95<L>  |  11  ----------------------------<  90  3.7   |  28  |  0.45<L>  11-13    136  |  98  |  11  ----------------------------<  88  3.8   |  29  |  0.40<L>  11-12    134<L>  |  96<L>  |  10  ----------------------------<  117<H>  3.3<L>   |  29  |  0.40<L>    Ca    8.3<L>      14 Nov 2023 06:40  Ca    8.2<L>      13 Nov 2023 07:08  Ca    7.9<L>      12 Nov 2023 09:23  Phos  2.1     11-14  Mg     1.90     11-14    TPro  6.4  /  Alb  2.5<L>  /  TBili  1.7<H>  /  DBili  x   /  AST  68<H>  /  ALT  39<H>  /  AlkPhos  1504<H>  11-14  TPro  5.9<L>  /  Alb  2.3<L>  /  TBili  1.3<H>  /  DBili  x   /  AST  48<H>  /  ALT  31  /  AlkPhos  1243<H>  11-13      PT/INR - ( 13 Nov 2023 07:08 )   PT: 20.3 sec;   INR: 1.83 ratio         PTT - ( 13 Nov 2023 07:08 )  PTT:35.9 sec              Urinalysis Basic - ( 14 Nov 2023 06:40 )    Color: x / Appearance: x / SG: x / pH: x  Gluc: 90 mg/dL / Ketone: x  / Bili: x / Urobili: x   Blood: x / Protein: x / Nitrite: x   Leuk Esterase: x / RBC: x / WBC x   Sq Epi: x / Non Sq Epi: x / Bacteria: x                              9.2    13.81 )-----------( 528      ( 13 Nov 2023 07:08 )             27.1                         9.8    12.89 )-----------( 557      ( 13 Nov 2023 01:02 )             28.8                         9.0    14.20 )-----------( 534      ( 12 Nov 2023 09:23 )             26.2     CAPILLARY BLOOD GLUCOSE          RADIOLOGY & ADDITIONAL TESTS:    Imaging Personally Reviewed:  [x ] YES  [ ] NO    Consultants involved in case:   Consultant(s) Notes Reviewed:  [ x] YES  [ ] NO:   Care Discussed with Consultants/Other Providers [x ] YES  [ ] NO         MARIANELA HOLM  53y  MRN: [id number]    Patient is a 53y old  Female who presents with a chief complaint of FTT/Ascites (14 Nov 2023 06:45)      Subjective: no events ON. Denies fever, CP, SOB, abn pain, N/V, dysuria. Dislikes hospital diet, family members bringing her food. Wants to go home after para.      MEDICATIONS  (STANDING):  furosemide    Tablet 40 milliGRAM(s) Oral two times a day  influenza   Vaccine 0.5 milliLiter(s) IntraMuscular once  pantoprazole  Injectable 40 milliGRAM(s) IV Push every 12 hours  pantoprazole  Injectable      potassium phosphate / sodium phosphate Powder (PHOS-NaK) 1 Packet(s) Oral once    MEDICATIONS  (PRN):  acetaminophen     Tablet .. 650 milliGRAM(s) Oral every 6 hours PRN Temp greater or equal to 38C (100.4F), Mild Pain (1 - 3)  melatonin 3 milliGRAM(s) Oral at bedtime PRN Insomnia  oxyCODONE    IR 10 milliGRAM(s) Oral every 6 hours PRN Severe Pain (7 - 10)  oxyCODONE    IR 5 milliGRAM(s) Oral every 6 hours PRN Moderate Pain (4 - 6)      Objective:    Vitals: Vital Signs Last 24 Hrs  T(C): 36.8 (11-14-23 @ 05:35), Max: 36.9 (11-13-23 @ 21:27)  T(F): 98.2 (11-14-23 @ 05:35), Max: 98.5 (11-13-23 @ 21:27)  HR: 81 (11-14-23 @ 05:35) (81 - 88)  BP: 123/56 (11-14-23 @ 05:35) (112/60 - 123/70)  BP(mean): --  RR: 16 (11-14-23 @ 05:35) (16 - 18)  SpO2: 100% (11-14-23 @ 05:35) (100% - 100%)            I&O's Summary      PHYSICAL EXAM:  GENERAL: NAD   HEAD:  Atraumatic, Normocephalic  EYES: EOMI, conjunctiva and sclera clear  CHEST/LUNG: Clear to auscultation bilaterally; No rales, rhonchi, wheezing, or rubs  HEART: Regular rate and rhythm; No murmurs, rubs, or gallops  ABDOMEN: Soft, Nontender, moderately distended   SKIN: No rashes or lesions  NERVOUS SYSTEM:  Alert & Oriented X3, no focal deficits    LABS:  11-14    132<L>  |  95<L>  |  11  ----------------------------<  90  3.7   |  28  |  0.45<L>  11-13    136  |  98  |  11  ----------------------------<  88  3.8   |  29  |  0.40<L>  11-12    134<L>  |  96<L>  |  10  ----------------------------<  117<H>  3.3<L>   |  29  |  0.40<L>    Ca    8.3<L>      14 Nov 2023 06:40  Ca    8.2<L>      13 Nov 2023 07:08  Ca    7.9<L>      12 Nov 2023 09:23  Phos  2.1     11-14  Mg     1.90     11-14    TPro  6.4  /  Alb  2.5<L>  /  TBili  1.7<H>  /  DBili  x   /  AST  68<H>  /  ALT  39<H>  /  AlkPhos  1504<H>  11-14  TPro  5.9<L>  /  Alb  2.3<L>  /  TBili  1.3<H>  /  DBili  x   /  AST  48<H>  /  ALT  31  /  AlkPhos  1243<H>  11-13      PT/INR - ( 13 Nov 2023 07:08 )   PT: 20.3 sec;   INR: 1.83 ratio         PTT - ( 13 Nov 2023 07:08 )  PTT:35.9 sec              Urinalysis Basic - ( 14 Nov 2023 06:40 )    Color: x / Appearance: x / SG: x / pH: x  Gluc: 90 mg/dL / Ketone: x  / Bili: x / Urobili: x   Blood: x / Protein: x / Nitrite: x   Leuk Esterase: x / RBC: x / WBC x   Sq Epi: x / Non Sq Epi: x / Bacteria: x                              9.2    13.81 )-----------( 528      ( 13 Nov 2023 07:08 )             27.1                         9.8    12.89 )-----------( 557      ( 13 Nov 2023 01:02 )             28.8                         9.0    14.20 )-----------( 534      ( 12 Nov 2023 09:23 )             26.2     CAPILLARY BLOOD GLUCOSE          RADIOLOGY & ADDITIONAL TESTS:    Imaging Personally Reviewed:  [x ] YES  [ ] NO    Consultants involved in case:   Consultant(s) Notes Reviewed:  [ x] YES  [ ] NO:   Care Discussed with Consultants/Other Providers [x ] YES  [ ] NO

## 2023-11-14 NOTE — PROCEDURE NOTE - ADDITIONAL PROCEDURE DETAILS
Invasive procedure team was consulted for therapeutic paracentesis due to abdominal discomfort. Explained risks (including bleeding, infection, and bowel perforation) and benefits to patient and patient expressed understanding and consented. Ultrasound was utilized and visualized 7 cm Ascitic fluid pocket identified w/ no epigastric vessels visualized. No rash or vessels overlying skin site. Pts plts 556, INR 1.83, DVT PPx held over night, not on NOACs or coumadin. Sterile technique was used and 10 mL of 1% lidocaine was used for local anesthesia. Small incision with scalpel done and 18 Fr Arrow Paracentesis catheter used. 2.5 L of serosanguinous ascitic fluid drained. Catheter removed and dressed. Pt tolerated procedure well and no complications. Lab Analysis sent. Communicated to primary team.

## 2023-11-14 NOTE — PROGRESS NOTE ADULT - PROBLEM SELECTOR PLAN 7
Diet: Regular Diet  DVT-P: Hold for paracentesis, GIB  Dispo: Pending clinical improvement Diet: Regular Diet  DVT-P: Hold for paracentesis, GIB  Dispo: pending d/c 11/15

## 2023-11-14 NOTE — PROGRESS NOTE ADULT - ATTENDING COMMENTS
54yo F with metastatic NET s/p resection with 2 months FTT, LE swelling, ascites. Admitted with vibrio infection causing diarrhea, receiving supportive care. Over the weekend with melena, hemoglobin drop. GI following, on PPI – conservative management. No immediate indication for CTA abd/pelvis. Likely secondary to colitis. Trend Hb.    s/p therapeutic paracentesis today with procedure team. Monitor for 24h since prior complications.    Monitor rising leukocytosis - remains afebrile. Studies not obtained with paracentesis today to r/o SBP.   Rising WBC count seems to be from hemoconcentration.

## 2023-11-14 NOTE — PROGRESS NOTE ADULT - ASSESSMENT
no further gib  conservative gi magment  leak from para improved  conservative magnemnt  ppi thearpy  poor overall prognosis,   cytology on ascites

## 2023-11-14 NOTE — PROGRESS NOTE ADULT - PROBLEM SELECTOR PLAN 2
SAAG of 2.9  -U/S to evaluate for BC -> Likely due to BCS    -PO lasix 40 BID   -procedure team consulted for therapeutic paracentesis-> Will defer till later when pt leaves SAAG of 2.9  -U/S to evaluate for BC -> Likely due to BCS    -PO lasix 40 BID   -para 11/14 - 2.5L removed

## 2023-11-14 NOTE — PROGRESS NOTE ADULT - SUBJECTIVE AND OBJECTIVE BOX
Patient is a 53y Female     Patient is a 53y old  Female who presents with a chief complaint of FTT/Ascites (13 Nov 2023 09:00)      HPI:  Amairani Franklin is a 53 year old F PMH of metastatic NET s/p resection presenting for 2 month history of FTT, LE swelling , and ascites s/p starting Evorlimus . These symptoms are constant, the LE swelling worsened with ambulation and weight bearing. This is associated with nausea and mild stomach pain.  No fevers, chills, melena, dysphagia, or hematochezia. No history of biliary dx, nephrolithiasis, or chronic NSAID use.     In ED VS wnl. In ED labs/imaging notable for WBC 15k, Hgb 11, , ALP 1423, AST 66, and ALT 47. CT AP concerning for "interval progression of disease with enlarging liver metastasis, peritoneal carcinomatosis and omental caking. New right iliac bone sclerosis concerning for bony metastasis.New large volume of abdominopelvic ascites.  Nonspecific duodenitis and enterocolitis.  Consider post therapeutic change, peptic ulcer disease.Focal thickening of the rectum without upstream dilatation.  The differential diagnosis includes focal peristalsis, subserosal metastatic implant or colonic neoplasm." Ascites tap notable for 08785 rbcs and 70 nucleated cells.  (09 Nov 2023 08:30)      PAST MEDICAL & SURGICAL HISTORY:  Vertigo      Neoplasm of digestive system      Malignant neoplasm of small intestine      Anemia      S/P laparoscopy  S/P Excsion left retroperitoneal mass 02/3/2014      H/O resection of small bowel  2014          MEDICATIONS  (STANDING):  furosemide    Tablet 40 milliGRAM(s) Oral two times a day  influenza   Vaccine 0.5 milliLiter(s) IntraMuscular once  pantoprazole  Injectable      pantoprazole  Injectable 40 milliGRAM(s) IV Push every 12 hours      Allergies    No Known Allergies    Intolerances        SOCIAL HISTORY:  Denies ETOh,Smoking,     FAMILY HISTORY:  No pertinent family history in first degree relatives        REVIEW OF SYSTEMS:    CONSTITUTIONAL: No weakness, fevers or chills  EYES/ENT: No visual changes;  No vertigo or throat pain   NECK: No pain or stiffness  RESPIRATORY: No cough, wheezing, hemoptysis; No shortness of breath  CARDIOVASCULAR: No chest pain or palpitations  GASTROINTESTINAL: No abdominal or epigastric pain. No nausea, vomiting, or hematemesis; No diarrhea or constipation. No melena or hematochezia.  GENITOURINARY: No dysuria, frequency or hematuria  NEUROLOGICAL: No numbness or weakness  SKIN: No itching, burning, rashes, or lesions   All other review of systems is negative unless indicated above.    VITAL:  T(C): , Max: 36.9 (11-13-23 @ 21:27)  T(F): , Max: 98.5 (11-13-23 @ 21:27)  HR: 81 (11-14-23 @ 05:35)  BP: 123/56 (11-14-23 @ 05:35)  BP(mean): --  RR: 16 (11-14-23 @ 05:35)  SpO2: 100% (11-14-23 @ 05:35)  Wt(kg): --    I and O's:    11-11 @ 07:01  -  11-12 @ 07:00  --------------------------------------------------------  IN: 250 mL / OUT: 350 mL / NET: -100 mL    11-12 @ 07:01  -  11-13 @ 07:00  --------------------------------------------------------  IN: 1200 mL / OUT: 0 mL / NET: 1200 mL          PHYSICAL EXAM:    Constitutional: NAD  HEENT: PERRLA,   Neck: No JVD  Respiratory: CTA B/L  Cardiovascular: S1 and S2  Gastrointestinal: BS+, soft, NT/ND  Extremities: No peripheral edema  Neurological: A/O x 3, no focal deficits  Psychiatric: Normal mood, normal affect  : No Gerber  Skin: No rashes  Access: Not applicable  Back: No CVA tenderness    LABS:                        9.2    13.81 )-----------( 528      ( 13 Nov 2023 07:08 )             27.1     11-13    136  |  98  |  11  ----------------------------<  88  3.8   |  29  |  0.40<L>    Ca    8.2<L>      13 Nov 2023 07:08  Phos  2.4     11-13  Mg     2.00     11-13    TPro  5.9<L>  /  Alb  2.3<L>  /  TBili  1.3<H>  /  DBili  x   /  AST  48<H>  /  ALT  31  /  AlkPhos  1243<H>  11-13          RADIOLOGY & ADDITIONAL STUDIES:

## 2023-11-14 NOTE — PROGRESS NOTE ADULT - PROBLEM SELECTOR PLAN 1
Melana/hematochezia 11/12/23 AM.  - GI consulted  - d/c lovenox 40 SQ  - Starting protonix 40 IV BID Melana/hematochezia 11/12/23 AM.  - GI consulted - no further GIB  - d/c lovenox 40 SQ  - Starting protonix 40 IV BID

## 2023-11-15 NOTE — PROGRESS NOTE ADULT - PROBLEM SELECTOR PLAN 7
Diet: Regular Diet  DVT-P: Hold for paracentesis, GIB  Dispo: pending d/c 11/15 Diet: Regular Diet  DVT-P: lovenox  Dispo: d/c 11/15

## 2023-11-15 NOTE — PROGRESS NOTE ADULT - ASSESSMENT
1) poor overall prognosis  2) vibrio in stool  3) abx per medicine  4) no plan for endoscopic evalation

## 2023-11-15 NOTE — PROGRESS NOTE ADULT - ASSESSMENT
Amairani Franklin is a 53 year old F PMH of metastatic NET s/p resection presenting for 2 month history of FTT, LE swelling , and ascites likely due to metastatic disease. Diarrhea - vibrio+. Melena/hematochezia (differing reports) 11/12. Amairani Franklin is a 53 year old F PMH of metastatic NET s/p resection with 2 month history of FTT, LE swelling , and ascites likely due to metastatic disease, admitted for diarrhea 2/2 vibrio infection, s/p para 11/14.

## 2023-11-15 NOTE — PROGRESS NOTE ADULT - PROBLEM SELECTOR PLAN 1
Melana/hematochezia 11/12/23 AM.  - GI consulted - no further GIB  - d/c lovenox 40 SQ  - c/w protonix 40 IV BID Melana/hematochezia 11/12/23 AM. No further episodes.  - GI consulted - no further GIB  - d/c lovenox 40 SQ  - c/w protonix 40 IV BID

## 2023-11-15 NOTE — PROGRESS NOTE ADULT - SUBJECTIVE AND OBJECTIVE BOX
MARIANELA HOLM  53y  MRN: [id number]    Patient is a 53y old  Female who presents with a chief complaint of FTT/Ascites (15 Nov 2023 06:21)      Subjective: no events ON. Denies fever, CP, SOB, abn pain, N/V, dysuria. Tolerating diet.      MEDICATIONS  (STANDING):  enoxaparin Injectable 40 milliGRAM(s) SubCutaneous every 24 hours  furosemide    Tablet 40 milliGRAM(s) Oral two times a day  influenza   Vaccine 0.5 milliLiter(s) IntraMuscular once  pantoprazole  Injectable 40 milliGRAM(s) IV Push every 12 hours  pantoprazole  Injectable        MEDICATIONS  (PRN):  acetaminophen     Tablet .. 650 milliGRAM(s) Oral every 6 hours PRN Temp greater or equal to 38C (100.4F), Mild Pain (1 - 3)  melatonin 3 milliGRAM(s) Oral at bedtime PRN Insomnia  oxyCODONE    IR 5 milliGRAM(s) Oral every 6 hours PRN Moderate Pain (4 - 6)  oxyCODONE    IR 10 milliGRAM(s) Oral every 6 hours PRN Severe Pain (7 - 10)      Objective:    Vitals: Vital Signs Last 24 Hrs  T(C): 36.7 (11-15-23 @ 06:08), Max: 36.8 (11-14-23 @ 22:10)  T(F): 98 (11-15-23 @ 06:08), Max: 98.2 (11-14-23 @ 22:10)  HR: 85 (11-15-23 @ 06:08) (83 - 85)  BP: 135/77 (11-15-23 @ 06:08) (108/69 - 135/77)  BP(mean): --  RR: 17 (11-15-23 @ 06:08) (17 - 17)  SpO2: 96% (11-15-23 @ 06:08) (96% - 100%)            I&O's Summary    14 Nov 2023 07:01  -  15 Nov 2023 07:00  --------------------------------------------------------  IN: 350 mL / OUT: 0 mL / NET: 350 mL        PHYSICAL EXAM:  GENERAL: NAD  HEAD:  Atraumatic, Normocephalic  EYES: EOMI, conjunctiva and sclera clear  CHEST/LUNG: Clear to auscultation bilaterally; No rales, rhonchi, wheezing, or rubs  HEART: Regular rate and rhythm; No murmurs, rubs, or gallops  ABDOMEN: Soft, Nontender, Nondistended;   SKIN: No rashes or lesions  NERVOUS SYSTEM:  Alert & Oriented X3, no focal deficits    LABS:  11-14    132<L>  |  95<L>  |  11  ----------------------------<  90  3.7   |  28  |  0.45<L>  11-13    136  |  98  |  11  ----------------------------<  88  3.8   |  29  |  0.40<L>  11-12    134<L>  |  96<L>  |  10  ----------------------------<  117<H>  3.3<L>   |  29  |  0.40<L>    Ca    8.3<L>      14 Nov 2023 06:40  Ca    8.2<L>      13 Nov 2023 07:08  Ca    7.9<L>      12 Nov 2023 09:23  Phos  2.1     11-14  Mg     1.90     11-14    TPro  6.4  /  Alb  2.5<L>  /  TBili  1.7<H>  /  DBili  x   /  AST  68<H>  /  ALT  39<H>  /  AlkPhos  1504<H>  11-14  TPro  5.9<L>  /  Alb  2.3<L>  /  TBili  1.3<H>  /  DBili  x   /  AST  48<H>  /  ALT  31  /  AlkPhos  1243<H>  11-13                    Urinalysis Basic - ( 14 Nov 2023 06:40 )    Color: x / Appearance: x / SG: x / pH: x  Gluc: 90 mg/dL / Ketone: x  / Bili: x / Urobili: x   Blood: x / Protein: x / Nitrite: x   Leuk Esterase: x / RBC: x / WBC x   Sq Epi: x / Non Sq Epi: x / Bacteria: x                              10.0   17.38 )-----------( 556      ( 14 Nov 2023 06:40 )             29.9                         9.2    13.81 )-----------( 528      ( 13 Nov 2023 07:08 )             27.1                         9.8    12.89 )-----------( 557      ( 13 Nov 2023 01:02 )             28.8     CAPILLARY BLOOD GLUCOSE          RADIOLOGY & ADDITIONAL TESTS:    Imaging Personally Reviewed:  [x ] YES  [ ] NO    Consultants involved in case:   Consultant(s) Notes Reviewed:  [ x] YES  [ ] NO:   Care Discussed with Consultants/Other Providers [x ] YES  [ ] NO         MARIANELA HOLM  53y  MRN: [id number]    Patient is a 53y old  Female who presents with a chief complaint of FTT/Ascites (15 Nov 2023 06:21)      Subjective: no events ON. Denies fever, CP, SOB, abn pain, N/V, dysuria. Tolerating diet- gets food from family. Feels better after para yesterday, although endorsing some LE swelling this AM. States she is ready to go home, does not think rehab would do anything for her.       MEDICATIONS  (STANDING):  enoxaparin Injectable 40 milliGRAM(s) SubCutaneous every 24 hours  furosemide    Tablet 40 milliGRAM(s) Oral two times a day  influenza   Vaccine 0.5 milliLiter(s) IntraMuscular once  pantoprazole  Injectable 40 milliGRAM(s) IV Push every 12 hours  pantoprazole  Injectable        MEDICATIONS  (PRN):  acetaminophen     Tablet .. 650 milliGRAM(s) Oral every 6 hours PRN Temp greater or equal to 38C (100.4F), Mild Pain (1 - 3)  melatonin 3 milliGRAM(s) Oral at bedtime PRN Insomnia  oxyCODONE    IR 5 milliGRAM(s) Oral every 6 hours PRN Moderate Pain (4 - 6)  oxyCODONE    IR 10 milliGRAM(s) Oral every 6 hours PRN Severe Pain (7 - 10)      Objective:    Vitals: Vital Signs Last 24 Hrs  T(C): 36.7 (11-15-23 @ 06:08), Max: 36.8 (11-14-23 @ 22:10)  T(F): 98 (11-15-23 @ 06:08), Max: 98.2 (11-14-23 @ 22:10)  HR: 85 (11-15-23 @ 06:08) (83 - 85)  BP: 135/77 (11-15-23 @ 06:08) (108/69 - 135/77)  BP(mean): --  RR: 17 (11-15-23 @ 06:08) (17 - 17)  SpO2: 96% (11-15-23 @ 06:08) (96% - 100%)            I&O's Summary    14 Nov 2023 07:01  -  15 Nov 2023 07:00  --------------------------------------------------------  IN: 350 mL / OUT: 0 mL / NET: 350 mL        PHYSICAL EXAM:  GENERAL: NAD  HEAD:  Atraumatic, Normocephalic  EYES: EOMI, conjunctiva and sclera clear  CHEST/LUNG: Clear to auscultation bilaterally; No rales, rhonchi, wheezing, or rubs  HEART: Regular rate and rhythm; No murmurs, rubs, or gallops  ABDOMEN: Soft, Nontender, Nondistended; para site bandage intact, no leak, no pain around area   SKIN: No rashes or lesions  EXTREMITIES: b/l LE edema   NERVOUS SYSTEM:  Alert & Oriented X3, no focal deficits    LABS:  11-14    132<L>  |  95<L>  |  11  ----------------------------<  90  3.7   |  28  |  0.45<L>  11-13    136  |  98  |  11  ----------------------------<  88  3.8   |  29  |  0.40<L>  11-12    134<L>  |  96<L>  |  10  ----------------------------<  117<H>  3.3<L>   |  29  |  0.40<L>    Ca    8.3<L>      14 Nov 2023 06:40  Ca    8.2<L>      13 Nov 2023 07:08  Ca    7.9<L>      12 Nov 2023 09:23  Phos  2.1     11-14  Mg     1.90     11-14    TPro  6.4  /  Alb  2.5<L>  /  TBili  1.7<H>  /  DBili  x   /  AST  68<H>  /  ALT  39<H>  /  AlkPhos  1504<H>  11-14  TPro  5.9<L>  /  Alb  2.3<L>  /  TBili  1.3<H>  /  DBili  x   /  AST  48<H>  /  ALT  31  /  AlkPhos  1243<H>  11-13                    Urinalysis Basic - ( 14 Nov 2023 06:40 )    Color: x / Appearance: x / SG: x / pH: x  Gluc: 90 mg/dL / Ketone: x  / Bili: x / Urobili: x   Blood: x / Protein: x / Nitrite: x   Leuk Esterase: x / RBC: x / WBC x   Sq Epi: x / Non Sq Epi: x / Bacteria: x                              10.0   17.38 )-----------( 556      ( 14 Nov 2023 06:40 )             29.9                         9.2    13.81 )-----------( 528      ( 13 Nov 2023 07:08 )             27.1                         9.8    12.89 )-----------( 557      ( 13 Nov 2023 01:02 )             28.8     CAPILLARY BLOOD GLUCOSE          RADIOLOGY & ADDITIONAL TESTS:    Imaging Personally Reviewed:  [x ] YES  [ ] NO    Consultants involved in case:   Consultant(s) Notes Reviewed:  [ x] YES  [ ] NO:   Care Discussed with Consultants/Other Providers [x ] YES  [ ] NO

## 2023-11-15 NOTE — PROGRESS NOTE ADULT - SUBJECTIVE AND OBJECTIVE BOX
Patient is a 53y Female     Patient is a 53y old  Female who presents with a chief complaint of FTT/Ascites (14 Nov 2023 08:25)      HPI:  Amairani Franklin is a 53 year old F PMH of metastatic NET s/p resection presenting for 2 month history of FTT, LE swelling , and ascites s/p starting Evorlimus . These symptoms are constant, the LE swelling worsened with ambulation and weight bearing. This is associated with nausea and mild stomach pain.  No fevers, chills, melena, dysphagia, or hematochezia. No history of biliary dx, nephrolithiasis, or chronic NSAID use.     In ED VS wnl. In ED labs/imaging notable for WBC 15k, Hgb 11, , ALP 1423, AST 66, and ALT 47. CT AP concerning for "interval progression of disease with enlarging liver metastasis, peritoneal carcinomatosis and omental caking. New right iliac bone sclerosis concerning for bony metastasis.New large volume of abdominopelvic ascites.  Nonspecific duodenitis and enterocolitis.  Consider post therapeutic change, peptic ulcer disease.Focal thickening of the rectum without upstream dilatation.  The differential diagnosis includes focal peristalsis, subserosal metastatic implant or colonic neoplasm." Ascites tap notable for 52778 rbcs and 70 nucleated cells.  (09 Nov 2023 08:30)      PAST MEDICAL & SURGICAL HISTORY:  Vertigo      Neoplasm of digestive system      Malignant neoplasm of small intestine      Anemia      S/P laparoscopy  S/P Excsion left retroperitoneal mass 02/3/2014      H/O resection of small bowel  2014          MEDICATIONS  (STANDING):  enoxaparin Injectable 40 milliGRAM(s) SubCutaneous every 24 hours  furosemide    Tablet 40 milliGRAM(s) Oral two times a day  influenza   Vaccine 0.5 milliLiter(s) IntraMuscular once  pantoprazole  Injectable 40 milliGRAM(s) IV Push every 12 hours  pantoprazole  Injectable          Allergies    No Known Allergies    Intolerances        SOCIAL HISTORY:  Denies ETOh,Smoking,     FAMILY HISTORY:  No pertinent family history in first degree relatives        REVIEW OF SYSTEMS:    CONSTITUTIONAL: No weakness, fevers or chills  EYES/ENT: No visual changes;  No vertigo or throat pain   NECK: No pain or stiffness  RESPIRATORY: No cough, wheezing, hemoptysis; No shortness of breath  CARDIOVASCULAR: No chest pain or palpitations  GASTROINTESTINAL: No abdominal or epigastric pain. No nausea, vomiting, or hematemesis; No diarrhea or constipation. No melena or hematochezia.  GENITOURINARY: No dysuria, frequency or hematuria  NEUROLOGICAL: No numbness or weakness  SKIN: No itching, burning, rashes, or lesions   All other review of systems is negative unless indicated above.    VITAL:  T(C): , Max: 36.8 (11-14-23 @ 22:10)  T(F): , Max: 98.2 (11-14-23 @ 22:10)  HR: 85 (11-15-23 @ 06:08)  BP: 135/77 (11-15-23 @ 06:08)  BP(mean): --  RR: 17 (11-15-23 @ 06:08)  SpO2: 96% (11-15-23 @ 06:08)  Wt(kg): --    I and O's:    11-12 @ 07:01  -  11-13 @ 07:00  --------------------------------------------------------  IN: 1200 mL / OUT: 0 mL / NET: 1200 mL    11-14 @ 07:01  -  11-15 @ 06:22  --------------------------------------------------------  IN: 350 mL / OUT: 0 mL / NET: 350 mL          PHYSICAL EXAM:    Constitutional: NAD  HEENT: PERRLA,   Neck: No JVD  Respiratory: CTA B/L  Cardiovascular: S1 and S2  Gastrointestinal: BS+, soft, NT/ND  Extremities: No peripheral edema  Neurological: A/O x 3, no focal deficits  Psychiatric: Normal mood, normal affect  : No Gerber  Skin: No rashes  Access: Not applicable  Back: No CVA tenderness    LABS:                        10.0   17.38 )-----------( 556      ( 14 Nov 2023 06:40 )             29.9     11-14    132<L>  |  95<L>  |  11  ----------------------------<  90  3.7   |  28  |  0.45<L>    Ca    8.3<L>      14 Nov 2023 06:40  Phos  2.1     11-14  Mg     1.90     11-14    TPro  6.4  /  Alb  2.5<L>  /  TBili  1.7<H>  /  DBili  x   /  AST  68<H>  /  ALT  39<H>  /  AlkPhos  1504<H>  11-14          RADIOLOGY & ADDITIONAL STUDIES:

## 2023-11-15 NOTE — PROGRESS NOTE ADULT - ATTENDING COMMENTS
54yo F with metastatic NET s/p resection with 2 months FTT, LE swelling, ascites. Admitted with vibrio infection causing diarrhea, receiving supportive care. Over the weekend with melena, hemoglobin drop. GI following, on PPI – conservative management, no plan for endoscopic evaluation/intervention. No immediate indication for CTA abd/pelvis. Likely secondary to colitis. Trend Hb.     s/p therapeutic paracentesis 11/14 with procedure team. Ascites studies were not performed.     Leukocytosis continues to rise, though she remains afebrile and is without overt infection. Abdomen not particularly suspicious for SBP, though no studies to confirm. If possible, may pursue diagnostic paracentesis. Initially appeared rising leukocytosis was related to hemoconcentration; currently it may be reactive after paracentesis.     Discussed with patient and her sister Audrey over the phone while at bedside. Offered her prophylactic SBP coverage and d/c with close f/u for repeat CBC or monitoring for signs of infection and repeat CBC in AM. Patient and sister opted to stay for additional monitoring and repeat CBC in AM, possible repeat diagnostic paracentesis.

## 2023-11-15 NOTE — PROGRESS NOTE ADULT - PROBLEM SELECTOR PLAN 2
SAAG of 2.9  -U/S to evaluate for BC -> Likely due to BCS    -PO lasix 40 BID   -para 11/14 - 2.5L removed SAAG of 2.9  -PO lasix 40 BID   -para 11/14 - 2.5L removed  -para site intact, no leak

## 2023-11-16 NOTE — CONSULT NOTE ADULT - SUBJECTIVE AND OBJECTIVE BOX
[facility]  Helena Espinal, Palliative Care Nurse Practitioner  Contact Info: Page [phone] (Including Nights/Weekends), Message on Microsoft Teams (Helena Espinal), or leave VM at Palliative Office [phone] (non-urgent)    Date of [id number] @ 12:48    HPI:  Amairani Franklin is a 53 year old F PMH of metastatic NET s/p resection presenting for 2 month history of FTT, LE swelling , and ascites s/p starting Evorlimus . These symptoms are constant, the LE swelling worsened with ambulation and weight bearing. This is associated with nausea and mild stomach pain.  No fevers, chills, melena, dysphagia, or hematochezia. No history of biliary dx, nephrolithiasis, or chronic NSAID use.     In ED VS wnl. In ED labs/imaging notable for WBC 15k, Hgb 11, , ALP 1423, AST 66, and ALT 47. CT AP concerning for "interval progression of disease with enlarging liver metastasis, peritoneal carcinomatosis and omental caking. New right iliac bone sclerosis concerning for bony metastasis.New large volume of abdominopelvic ascites.  Nonspecific duodenitis and enterocolitis.  Consider post therapeutic change, peptic ulcer disease.Focal thickening of the rectum without upstream dilatation.  The differential diagnosis includes focal peristalsis, subserosal metastatic implant or colonic neoplasm." Ascites tap notable for 32534 rbcs and 70 nucleated cells.  (09 Nov 2023 08:30)    PERTINENT PM/SXH:   Vertigo    Neoplasm of digestive system    Malignant neoplasm of small intestine    Anemia      NO SIGNIFICANT PAST SURGICAL HISTORY    S/P laparoscopy    H/O resection of small bowel        -------------------------------------------------------------------------------------------------------    FAMILY HISTORY:  No pertinent family history in first degree relatives      Family Hx substance abuse [ ]yes [ ]no  ITEMS NOT CHECKED ARE NOT PRESENT    SOCIAL HISTORY:   Significant other/partner[ ]  Children[x ]  Yazidism/Spirituality:  Substance hx:  [ ]   Tobacco hx:  [ ]   Alcohol hx: [ ]   Home Opioid hx:  [x ] I-Stop Reference No: [id number]    Practitioner Count: 1  Pharmacy Count: 1  Current Opioid Prescriptions: 0  Current Benzodiazepine Prescriptions: 0  Current Stimulant Prescriptions: 0      Patient Demographic Information (PDI)       PDI	First Name	Last Name	Birth Date	Gender	Street Address	[facility]  JOES Franklin	1970	Female	[id number] [phone]    Prescription Information      PDI Filter:    PDI	My Rx	Current Rx	Drug Type	Rx Written	Rx Dispensed	Drug	Quantity	Days Supply	Prescriber Name	Prescriber MAE #	Payment Method	Dispenser  A	N	N	O	08/29/2023	10/09/2023	diphenoxylate-atropine 2.5-0.025 mg tablet	30	10	Eva Souza N	[id number]	[facility]  Living Situation: [x ]Home  [ ]Long term care  [ ]Rehab [ ]Other    BASELINE (I)ADL(s) (prior to admission):  [place]: [ ]Total  [ ] Moderate [ ]Dependent    -------------------------------------------------------------------------------------------------------  ADVANCE DIRECTIVES:    DNR/MOLST  [ ]  Living Will  [ ]   DECISION MAKER(s):  [ ] Health Care Proxy(s)  [ x] Surrogate(s)  [ ] Guardian           Name(s): Phone Number(s):    -------------------------------------------------------------------------------------------------------  Allergies    No Known Allergies    Intolerances    MEDICATIONS  (STANDING):  furosemide    Tablet 40 milliGRAM(s) Oral two times a day  influenza   Vaccine 0.5 milliLiter(s) IntraMuscular once  pantoprazole  Injectable 40 milliGRAM(s) IV Push every 12 hours  pantoprazole  Injectable        MEDICATIONS  (PRN):  acetaminophen     Tablet .. 650 milliGRAM(s) Oral every 6 hours PRN Temp greater or equal to 38C (100.4F), Mild Pain (1 - 3)  melatonin 3 milliGRAM(s) Oral at bedtime PRN Insomnia  oxyCODONE    IR 5 milliGRAM(s) Oral every 6 hours PRN Moderate Pain (4 - 6)  oxyCODONE    IR 10 milliGRAM(s) Oral every 6 hours PRN Severe Pain (7 - 10)    PRESENT SYMPTOMS: [ ]Unable to self-report  [ ] CPOT [ ] PAINADs [ ] RDOS  Source if other than patient:  [ ]Family   [ ]Team     Pain: [x ]yes [ ]no  QOL impact -   Location - abd              Aggravating factors - none  Quality - pressure / fullness  Radiation - none  Timing- intermittent   Severity (0-10 scale): 5  Minimal acceptable level (0-10 scale)/Pain goal: 4/10    CPOT:    https://www.[facility].org/getattachment/[email or web address]-7o1a-5w5h-1980l1096d9m/Critical-Care-Pain-Observation-Tool-(CPOT)    PAINAD Score: See PAINAD tool and score below       RDOS: See RDOS tool and score below   0 to 2  minimal or no respiratory distress   3  mild distress  4 to 6 moderate distress  >7 severe distress    Dyspnea:                           [ ]Mild [ ]Moderate [ ]Severe  Anxiety:                             [ ]Mild [ ]Moderate [ ]Severe  Fatigue:                             [X ]Mild [ ]Moderate [ ]Severe  Nausea:                             [ ]Mild [ ]Moderate [ ]Severe  Loss of appetite:              [ ]Mild [ ]Moderate [ ]Severe  Constipation:                    [ ]Mild [ ]Moderate [ ]Severe  Other Symptoms:  [ X]All other review of systems negative     -------------------------------------------------------------------------------------------------------  PCSSQ[Palliative Care Spiritual Screening Question]   Severity (0-10):  Score of 4 or > indicate consideration of Chaplaincy referral.  Chaplaincy Referral: [ ] yes [ ] refused [ ] following [X ] Deferred     Caregiver Ava? : [ ] yes [ ] no [ ] Deferred [X ] Declined             Social work referral [ ] Patient & Family Centered Care Referral [ ]     Anticipatory Grief present?:  [ ] yes [ ] no  [X ] Deferred                  Social work referral [ ] Chaplaincy Referral [ ]      -------------------------------------------------------------------------------------------------------  PHYSICAL EXAM:  Vital Signs Last 24 Hrs  T(C): 36.8 (16 Nov 2023 05:47), Max: 36.8 (16 Nov 2023 05:47)  T(F): 98.2 (16 Nov 2023 05:47), Max: 98.2 (16 Nov 2023 05:47)  HR: 81 (16 Nov 2023 05:47) (81 - 86)  BP: 106/62 (16 Nov 2023 05:47) (106/62 - 115/65)  BP(mean): --  RR: 18 (16 Nov 2023 05:47) (17 - 18)  SpO2: 100% (16 Nov 2023 05:47) (100% - 100%)    Parameters below as of 16 Nov 2023 05:47  Patient On (Oxygen Delivery Method): room air     I&O's Summary    15 Nov 2023 07:01  -  16 Nov 2023 07:00  --------------------------------------------------------  IN: 400 mL / OUT: 0 mL / NET: 400 mL        GENERAL:  [X]Cachexia  [X ]Alert  [X ]Oriented x4   [ ]Lethargic  [ ]Unarousable  [ ]Verbal  [ ]Non-Verbal    Behavioral:   [ ] Anxiety  [ ] Delirium [ ] Agitation [X ] Other    HEENT:  [X ]Normal   [ ]Dry mouth   [ ]ET Tube/Trach  [ ]Oral lesions    PULMONARY:   [X ]Clear [ ]Tachypnea  [ ]Audible excessive secretions   [ ]Rhonchi        [ ]Right [ ]Left [ ]Bilateral  [ ]Crackles        [ ]Right [ ]Left [ ]Bilateral  [ ]Wheezing     [ ]Right [ ]Left [ ]Bilateral  [ ]Diminished breath sounds [ ]right [ ]left [ ]bilateral    CARDIOVASCULAR:    [X ]Regular [ ]Irregular [ ]Tachy  [ ]Quentin [ ]Murmur [ ]Other    GASTROINTESTINAL:  [ ]Soft  [X ]Distended   [ ]+BS  [ ]Non tender [X ]Tender  [ ]Other [ ]PEG [ ]OGT/ NGT  Last BM: 11/16    GENITOURINARY:  [ X]Normal [ ] Incontinent   [ ]Oliguria/Anuria   [ ]Gerber    MUSCULOSKELETAL:   [ ]Normal   [X ]Weakness  [ ]Bed/Wheelchair bound [ ]Edema    NEUROLOGIC:   [X ]No focal deficits  [ ]Cognitive impairment  [ ]Dysphagia [ ]Dysarthria [ ]Paresis [ ]Other     SKIN:   [ X]Normal  [ ]Rash  [ ]Other  [ ]Pressure ulcer(s)       Present on admission [ ]y [ ]n    -------------------------------------------------------------------------------------------------------  CRITICAL CARE:  [ ] Shock Present  [ ]Septic [ ]Cardiogenic [ ]Neurologic [ ]Hypovolemic  [ ]  Vasopressors [ ]  Inotropes   [ ]Respiratory failure present [ ]Mechanical ventilation [ ]Non-invasive ventilatory support [ ]High flow    [ ]Acute  [ ]Chronic [ ]Hypoxic  [ ]Hypercarbic [ ]Other  [ ]Other organ failure     -------------------------------------------------------------------------------------------------------  LABS:                        8.9    18.08 )-----------( 264      ( 16 Nov 2023 08:00 )             25.6   11-16    132<L>  |  93<L>  |  14  ----------------------------<  83  3.6   |  31  |  0.42<L>    Ca    8.5      16 Nov 2023 08:00  Phos  2.1     11-16  Mg     2.00     11-16      Urinalysis Basic - ( 16 Nov 2023 08:00 )    Color: x / Appearance: x / SG: x / pH: x  Gluc: 83 mg/dL / Ketone: x  / Bili: x / Urobili: x   Blood: x / Protein: x / Nitrite: x   Leuk Esterase: x / RBC: x / WBC x   Sq Epi: x / Non Sq Epi: x / Bacteria: x    -------------------------------------------------------------------------------------------------------  RADIOLOGY & ADDITIONAL STUDIES: < from: CT Abdomen and Pelvis w/ IV Cont (11.08.23 @ 22:11) >  IMPRESSION:  Compared to the prior study of 6/13/22, interval progression of disease   with enlarging liver metastasis, peritoneal carcinomatosis and omental   caking. New right iliac bone sclerosis concerning for bony metastasis.    New large volume of abdominopelvic ascites.  Patent main portal vein.    Nonspecific duodenitis and enterocolitis.  Consider posttherapeutic   change, peptic ulcer disease.    Focal thickening of the rectum without upstream dilatation.  The   differential diagnosis includes focal peristalsis, subserosal metastatic   implant or colonic neoplasm.      --- End of Report ---    < end of copied text >    -------------------------------------------------------------------------------------------------------  PROTEIN CALORIE MALNUTRITION PRESENT: [ ]mild [ ]moderate [ ]severe [ ]underweight [ ]morbid obesity  https://[email or web address].org/[place]/2440/web/files/[email or web address]_Clinical%20Characteristics%20to%[email or web address]-White%20JV%20et%20al%202012.pdf    Height (cm): 157.5 (11-09-23 @ 05:53), 157.5 (10-30-23 @ 16:00)  Weight (kg): 45.495 (11-15-23 @ 06:08), 48 (10-30-23 @ 16:00)  BMI (kg/m2): 18.3 (11-15-23 @ 06:08), 19.3 (11-09-23 @ 05:53), 19.3 (10-30-23 @ 16:00)    Palliative Performance Status Version 2:   See PPSv2 tool and score below          [ ]PPSV2 < or = to 30% [ ]significant weight loss  [ ]poor nutritional intake  [ ]anasarca[ ]Artificial Nutrition      -------------------------------------------------------------------------------------------------------  Other REFERRALS:  [ ]Hospice  [ ]Child Life  [ ]Social Work  [ ]Case management [ ]Holistic Therapy     -------------------------------------------------------------------------------------------------------  Goals of Care Document:

## 2023-11-16 NOTE — PROGRESS NOTE ADULT - ASSESSMENT
appreciate paracentesis  conservative gi magnemnt  no acute gi complaints  hgb stable  no plan for colonosocopy  risk>benefit  overall prognosis poor

## 2023-11-16 NOTE — PROGRESS NOTE ADULT - PROBLEM SELECTOR PLAN 2
SAAG of 2.9  -PO lasix 40 BID   -para 11/14 - 2.5L removed  -para site intact, no leak  -pt with rising WBC, afebrile, clinically asymptomatic, however did have leak from last para for several days, possible concern for SBP SAAG of 2.9  -PO lasix 40 BID   -para 11/14 - 2.5L removed  -para site intact, no leak  -pt with initial uptrend in WBC, afebrile, clinically asymptomatic, does not meet criteria for prophylaxis for SBP at this time (no cirrhosis, no evidence of varices) - WBC now downtrending. likely reactive in setting of recent para.

## 2023-11-16 NOTE — CONSULT NOTE ADULT - PROBLEM SELECTOR RECOMMENDATION 3
> Pt shared occasionally experiences abd discomfort  > Oxycodone 5mg helped but is concerned it causes her to have bloody stool? Educated pt on proper use of medication & her dx of peptic ulcer dz/colitis contributing to bloody stool.   > Pt denies pain at time of encounter and shared she will take PRN medications at home if pain returns. PRN Oxycodone 5mg Q6hrs.   > Pt referred to Dr. Oliveros, Awa Lomeli NP (please include in dc papers)  [facility] Medicine, [facility]  Phone: [phone] [address] [id number]

## 2023-11-16 NOTE — PROGRESS NOTE ADULT - SUBJECTIVE AND OBJECTIVE BOX
MARIANELA HOLM  53y  MRN: [id number]    Patient is a 53y old  Female who presents with a chief complaint of FTT/Ascites (16 Nov 2023 06:44)      Subjective: no events ON. Denies fever, CP, SOB, abn pain, N/V, dysuria. Tolerating diet.      MEDICATIONS  (STANDING):  furosemide    Tablet 40 milliGRAM(s) Oral two times a day  influenza   Vaccine 0.5 milliLiter(s) IntraMuscular once  pantoprazole  Injectable 40 milliGRAM(s) IV Push every 12 hours  pantoprazole  Injectable        MEDICATIONS  (PRN):  acetaminophen     Tablet .. 650 milliGRAM(s) Oral every 6 hours PRN Temp greater or equal to 38C (100.4F), Mild Pain (1 - 3)  melatonin 3 milliGRAM(s) Oral at bedtime PRN Insomnia  oxyCODONE    IR 5 milliGRAM(s) Oral every 6 hours PRN Moderate Pain (4 - 6)  oxyCODONE    IR 10 milliGRAM(s) Oral every 6 hours PRN Severe Pain (7 - 10)      Objective:    Vitals: Vital Signs Last 24 Hrs  T(C): 36.8 (11-16-23 @ 05:47), Max: 36.8 (11-16-23 @ 05:47)  T(F): 98.2 (11-16-23 @ 05:47), Max: 98.2 (11-16-23 @ 05:47)  HR: 81 (11-16-23 @ 05:47) (81 - 86)  BP: 106/62 (11-16-23 @ 05:47) (106/62 - 115/65)  BP(mean): --  RR: 18 (11-16-23 @ 05:47) (17 - 18)  SpO2: 100% (11-16-23 @ 05:47) (100% - 100%)            I&O's Summary    15 Nov 2023 07:01  -  16 Nov 2023 07:00  --------------------------------------------------------  IN: 400 mL / OUT: 0 mL / NET: 400 mL        PHYSICAL EXAM:  GENERAL: NAD  HEAD:  Atraumatic, Normocephalic  EYES: EOMI, conjunctiva and sclera clear  CHEST/LUNG: Clear to auscultation bilaterally; No rales, rhonchi, wheezing, or rubs  HEART: Regular rate and rhythm; No murmurs, rubs, or gallops  ABDOMEN: Soft, Nontender, Nondistended;   SKIN: No rashes or lesions  NERVOUS SYSTEM:  Alert & Oriented X3, no focal deficits    LABS:  11-15    133<L>  |  92<L>  |  11  ----------------------------<  190<H>  3.2<L>   |  30  |  0.49<L>  11-14    132<L>  |  95<L>  |  11  ----------------------------<  90  3.7   |  28  |  0.45<L>    Ca    8.1<L>      15 Nov 2023 10:55  Ca    8.3<L>      14 Nov 2023 06:40  Phos  2.7     11-15  Mg     1.90     11-15    TPro  6.4  /  Alb  2.5<L>  /  TBili  1.7<H>  /  DBili  x   /  AST  68<H>  /  ALT  39<H>  /  AlkPhos  1504<H>  11-14                    Urinalysis Basic - ( 15 Nov 2023 10:55 )    Color: x / Appearance: x / SG: x / pH: x  Gluc: 190 mg/dL / Ketone: x  / Bili: x / Urobili: x   Blood: x / Protein: x / Nitrite: x   Leuk Esterase: x / RBC: x / WBC x   Sq Epi: x / Non Sq Epi: x / Bacteria: x                              9.6    20.14 )-----------( 574      ( 15 Nov 2023 10:55 )             27.8                         10.0   17.38 )-----------( 556      ( 14 Nov 2023 06:40 )             29.9     CAPILLARY BLOOD GLUCOSE          RADIOLOGY & ADDITIONAL TESTS:    Imaging Personally Reviewed:  [x ] YES  [ ] NO    Consultants involved in case:   Consultant(s) Notes Reviewed:  [ x] YES  [ ] NO:   Care Discussed with Consultants/Other Providers [x ] YES  [ ] NO         MARIANELA HOLM  53y  MRN: [id number]    Patient is a 53y old  Female who presents with a chief complaint of FTT/Ascites (16 Nov 2023 06:44)      Subjective: no events ON. Denies fever, CP, SOB, abn pain, N/V, dysuria. Tolerating diet.      MEDICATIONS  (STANDING):  furosemide    Tablet 40 milliGRAM(s) Oral two times a day  influenza   Vaccine 0.5 milliLiter(s) IntraMuscular once  pantoprazole  Injectable 40 milliGRAM(s) IV Push every 12 hours  pantoprazole  Injectable        MEDICATIONS  (PRN):  acetaminophen     Tablet .. 650 milliGRAM(s) Oral every 6 hours PRN Temp greater or equal to 38C (100.4F), Mild Pain (1 - 3)  melatonin 3 milliGRAM(s) Oral at bedtime PRN Insomnia  oxyCODONE    IR 5 milliGRAM(s) Oral every 6 hours PRN Moderate Pain (4 - 6)  oxyCODONE    IR 10 milliGRAM(s) Oral every 6 hours PRN Severe Pain (7 - 10)      Objective:    Vitals: Vital Signs Last 24 Hrs  T(C): 36.8 (11-16-23 @ 05:47), Max: 36.8 (11-16-23 @ 05:47)  T(F): 98.2 (11-16-23 @ 05:47), Max: 98.2 (11-16-23 @ 05:47)  HR: 81 (11-16-23 @ 05:47) (81 - 86)  BP: 106/62 (11-16-23 @ 05:47) (106/62 - 115/65)  BP(mean): --  RR: 18 (11-16-23 @ 05:47) (17 - 18)  SpO2: 100% (11-16-23 @ 05:47) (100% - 100%)            I&O's Summary    15 Nov 2023 07:01  -  16 Nov 2023 07:00  --------------------------------------------------------  IN: 400 mL / OUT: 0 mL / NET: 400 mL        PHYSICAL EXAM:  GENERAL: NAD  HEAD:  Atraumatic, Normocephalic  EYES: EOMI, conjunctiva and sclera clear  CHEST/LUNG: Clear to auscultation bilaterally; No rales, rhonchi, wheezing, or rubs  HEART: Regular rate and rhythm; No murmurs, rubs, or gallops  ABDOMEN: Soft, Nondistended; mild tenderness at site of para with deep palpation   SKIN: No rashes or lesions  EXTREMITIES +LE edema   NERVOUS SYSTEM:  Alert & Oriented X3, no focal deficits    LABS:  11-15    133<L>  |  92<L>  |  11  ----------------------------<  190<H>  3.2<L>   |  30  |  0.49<L>  11-14    132<L>  |  95<L>  |  11  ----------------------------<  90  3.7   |  28  |  0.45<L>    Ca    8.1<L>      15 Nov 2023 10:55  Ca    8.3<L>      14 Nov 2023 06:40  Phos  2.7     11-15  Mg     1.90     11-15    TPro  6.4  /  Alb  2.5<L>  /  TBili  1.7<H>  /  DBili  x   /  AST  68<H>  /  ALT  39<H>  /  AlkPhos  1504<H>  11-14                    Urinalysis Basic - ( 15 Nov 2023 10:55 )    Color: x / Appearance: x / SG: x / pH: x  Gluc: 190 mg/dL / Ketone: x  / Bili: x / Urobili: x   Blood: x / Protein: x / Nitrite: x   Leuk Esterase: x / RBC: x / WBC x   Sq Epi: x / Non Sq Epi: x / Bacteria: x                              9.6    20.14 )-----------( 574      ( 15 Nov 2023 10:55 )             27.8                         10.0   17.38 )-----------( 556      ( 14 Nov 2023 06:40 )             29.9     CAPILLARY BLOOD GLUCOSE          RADIOLOGY & ADDITIONAL TESTS:    Imaging Personally Reviewed:  [x ] YES  [ ] NO    Consultants involved in case:   Consultant(s) Notes Reviewed:  [ x] YES  [ ] NO:   Care Discussed with Consultants/Other Providers [x ] YES  [ ] NO

## 2023-11-16 NOTE — CONSULT NOTE ADULT - ASSESSMENT
Amairani Franklin is a 53 year old F PMH of metastatic NET s/p resection presenting for 2 month history of FTT, LE swelling , and ascites s/p starting Evorlimus . These symptoms are constant, the LE swelling worsened with ambulation and weight bearing. This is associated with nausea and mild stomach pain.  No fevers, chills, melena, dysphagia, or hematochezia. No history of biliary dx, nephrolithiasis, or chronic NSAID use. Palliative consulted for goals of care.

## 2023-11-16 NOTE — CONSULT NOTE ADULT - PROBLEM SELECTOR RECOMMENDATION 9
> Pt know to Dr. Darlene Whitmore @ [facility], reviewed outpt [employer] documentation.   > Pt recenetly started on s/p evorlimus in August. Hoever has been held since 10/30, weekly lamreotide, and 4 doses PRRT 177 Jyotsna dotatate  > Pt shared she does not wish to continue with Evorlimus but is interested in following up with Dr. Whitmore outpatient to discuss further treatment.

## 2023-11-16 NOTE — DISCHARGE NOTE NURSING/CASE MANAGEMENT/SOCIAL WORK - PATIENT PORTAL LINK FT
You can access the FollowMyHealth Patient Portal offered by [facility] by registering at the following website: [email or web address]. By joining [employer]’s FollowMyHealth portal, you will also be able to view your health information using other applications (apps) compatible with our system.

## 2023-11-16 NOTE — DISCHARGE NOTE NURSING/CASE MANAGEMENT/SOCIAL WORK - NSDCPNINST_GEN_ALL_CORE
Patient remain alert and oriented, VSS, denies pain, ambulating tolerating well, IV access discontinued per hospital discharge policy, patient verbalizes full understanding of discharge paperwork, pending  by family.

## 2023-11-16 NOTE — DISCHARGE NOTE NURSING/CASE MANAGEMENT/SOCIAL WORK - NSDCFUADDAPPT_GEN_ALL_CORE_FT
APPTS ARE READY TO BE MADE: [X] YES    Best Family or Patient Contact (if needed):    Additional Information about above appointments (if needed):    1: Internal Medicine   2:   3:     Other comments or requests:

## 2023-11-16 NOTE — PROGRESS NOTE ADULT - SUBJECTIVE AND OBJECTIVE BOX
Patient is a 53y Female     Patient is a 53y old  Female who presents with a chief complaint of FTT/Ascites (15 Nov 2023 07:51)      HPI:  Amairani Franklin is a 53 year old F PMH of metastatic NET s/p resection presenting for 2 month history of FTT, LE swelling , and ascites s/p starting Evorlimus . These symptoms are constant, the LE swelling worsened with ambulation and weight bearing. This is associated with nausea and mild stomach pain.  No fevers, chills, melena, dysphagia, or hematochezia. No history of biliary dx, nephrolithiasis, or chronic NSAID use.     In ED VS wnl. In ED labs/imaging notable for WBC 15k, Hgb 11, , ALP 1423, AST 66, and ALT 47. CT AP concerning for "interval progression of disease with enlarging liver metastasis, peritoneal carcinomatosis and omental caking. New right iliac bone sclerosis concerning for bony metastasis.New large volume of abdominopelvic ascites.  Nonspecific duodenitis and enterocolitis.  Consider post therapeutic change, peptic ulcer disease.Focal thickening of the rectum without upstream dilatation.  The differential diagnosis includes focal peristalsis, subserosal metastatic implant or colonic neoplasm." Ascites tap notable for 11410 rbcs and 70 nucleated cells.  (09 Nov 2023 08:30)      PAST MEDICAL & SURGICAL HISTORY:  Vertigo      Neoplasm of digestive system      Malignant neoplasm of small intestine      Anemia      S/P laparoscopy  S/P Excsion left retroperitoneal mass 02/3/2014      H/O resection of small bowel  2014          MEDICATIONS  (STANDING):  furosemide    Tablet 40 milliGRAM(s) Oral two times a day  influenza   Vaccine 0.5 milliLiter(s) IntraMuscular once  pantoprazole  Injectable 40 milliGRAM(s) IV Push every 12 hours  pantoprazole  Injectable          Allergies    No Known Allergies    Intolerances        SOCIAL HISTORY:  Denies ETOh,Smoking,     FAMILY HISTORY:  No pertinent family history in first degree relatives        REVIEW OF SYSTEMS:    CONSTITUTIONAL: No weakness, fevers or chills  EYES/ENT: No visual changes;  No vertigo or throat pain   NECK: No pain or stiffness  RESPIRATORY: No cough, wheezing, hemoptysis; No shortness of breath  CARDIOVASCULAR: No chest pain or palpitations  GASTROINTESTINAL: No abdominal or epigastric pain. No nausea, vomiting, or hematemesis; No diarrhea or constipation. No melena or hematochezia.  GENITOURINARY: No dysuria, frequency or hematuria  NEUROLOGICAL: No numbness or weakness  SKIN: No itching, burning, rashes, or lesions   All other review of systems is negative unless indicated above.    VITAL:  T(C): , Max: 36.8 (11-16-23 @ 05:47)  T(F): , Max: 98.2 (11-16-23 @ 05:47)  HR: 81 (11-16-23 @ 05:47)  BP: 106/62 (11-16-23 @ 05:47)  BP(mean): --  RR: 18 (11-16-23 @ 05:47)  SpO2: 100% (11-16-23 @ 05:47)  Wt(kg): --    I and O's:    11-14 @ 07:01  -  11-15 @ 07:00  --------------------------------------------------------  IN: 350 mL / OUT: 0 mL / NET: 350 mL    11-15 @ 07:01  -  11-16 @ 06:45  --------------------------------------------------------  IN: 400 mL / OUT: 0 mL / NET: 400 mL          PHYSICAL EXAM:    Constitutional: NAD  HEENT: PERRLA,   Neck: No JVD  Respiratory: CTA B/L  Cardiovascular: S1 and S2  Gastrointestinal: BS+, soft, NT/ND  Extremities: No peripheral edema  Neurological: A/O x 3, no focal deficits  Psychiatric: Normal mood, normal affect  : No Gerber  Skin: No rashes  Access: Not applicable  Back: No CVA tenderness    LABS:                        9.6    20.14 )-----------( 574      ( 15 Nov 2023 10:55 )             27.8     11-15    133<L>  |  92<L>  |  11  ----------------------------<  190<H>  3.2<L>   |  30  |  0.49<L>    Ca    8.1<L>      15 Nov 2023 10:55  Phos  2.7     11-15  Mg     1.90     11-15            RADIOLOGY & ADDITIONAL STUDIES:

## 2023-11-16 NOTE — PROGRESS NOTE ADULT - NUTRITIONAL ASSESSMENT
This patient has been assessed with a concern for Malnutrition and has been determined to have a diagnosis/diagnoses of Severe protein-calorie malnutrition.    This patient is being managed with:   Diet Regular-  Entered: Nov 9 2023  9:32AM  

## 2023-11-16 NOTE — PROGRESS NOTE ADULT - TIME BILLING
Time-based billing (NON-critical care).     35 minutes spent on total encounter; more than 50% of the visit was spent counseling and / or coordinating care by the attending physician.  The necessity of the time spent during the encounter on this date of service was due to:     review of laboratory data, radiology results, consultants' recommendations, documentation in [place], discussion with patient and interdisciplinary staff (such as , social workers, etc). Interventions were performed as documented above.
Time-based billing (NON-critical care).     35 minutes spent on total encounter; more than 50% of the visit was spent counseling and / or coordinating care by the attending physician.  The necessity of the time spent during the encounter on this date of service was due to:     review of laboratory data, radiology results, consultants' recommendations, documentation in [place], discussion with patient and interdisciplinary staff (such as , social workers, etc). Interventions were performed as documented above.
Time-based billing (NON-critical care).     35 minutes spent on total encounter; more than 50% of the visit was spent counseling and / or coordinating care by the attending physician.  The necessity of the time spent during the encounter on this date of service was due to:     review of laboratory data, radiology results, consultants' recommendations, documentation in [place], discussion with patient and interdisciplinary staff (such as , social workers, etc). Interventions were performed as documented above.
Time-based billing (NON-critical care).     35 minutes spent on total encounter; more than 50% of the visit was spent counseling and / or coordinating care by the attending physician.  The necessity of the time spent during the encounter on this date of service was due to:     review of laboratory data, radiology results, consultants' recommendations, documentation in [place], discussion with patient and interdisciplinary staff (such as , social workers, etc). Interventions were performed as documented above.

## 2023-11-16 NOTE — PROGRESS NOTE ADULT - PROBLEM SELECTOR PLAN 1
Melana/hematochezia 11/12/23 AM. No further episodes.  - GI consulted - no further GIB  - d/c lovenox 40 SQ  - c/w protonix 40 IV BID

## 2023-11-16 NOTE — CONSULT NOTE ADULT - PROBLEM SELECTOR RECOMMENDATION 6
> HCP form filled out & placed in chart. Mother [place] Neptali [phone] as primary. Sister Saurabh Vides [phone] as secondary.

## 2023-11-16 NOTE — PROGRESS NOTE ADULT - PROVIDER SPECIALTY LIST ADULT
Gastroenterology
Internal Medicine
Gastroenterology
Gastroenterology
Internal Medicine

## 2023-11-16 NOTE — PROGRESS NOTE ADULT - ASSESSMENT
Amairani Franklin is a 53 year old F PMH of metastatic NET s/p resection with 2 month history of FTT, LE swelling , and ascites likely due to metastatic disease, admitted for diarrhea 2/2 vibrio infection, s/p para 11/14.

## 2023-11-16 NOTE — PROGRESS NOTE ADULT - ATTENDING COMMENTS
54yo F with metastatic NET s/p resection with 2 months FTT, LE swelling, ascites. Admitted with vibrio infection causing diarrhea, receiving supportive care. Over the weekend with melena, hemoglobin drop. GI following, on PPI – conservative management, no plan for endoscopic evaluation/intervention. No immediate indication for CTA abd/pelvis. Likely secondary to colitis. Trend Hb.     s/p therapeutic paracentesis 11/14 with procedure team. Ascites studies were not performed.     Leukocytosis had risen, though she remains afebrile and is without overt infection. Abdomen not suspicious for SBP, though no studies to confirm. Will defer diagnostic paracentesis as risk may outweigh benefit. Initially appeared rising leukocytosis was related to hemoconcentration; currently it may be reactive after paracentesis. No fevers, no infectious symptoms. Would recommend repeat CBC within 1 week of dc.    Dc to home today.

## 2023-11-16 NOTE — PROGRESS NOTE ADULT - REASON FOR ADMISSION
FTT/Ascites

## 2023-11-16 NOTE — CONSULT NOTE ADULT - PROBLEM SELECTOR RECOMMENDATION 5
Thank you for allowing us to participate in your patient's care. We will continue to follow with you. Please page [phone] for any q's or c's. The Geriatric and Palliative Medicine service has coverage 24 hours a day/ 7 days a week to provide medical recommendations regarding symptom management needs via telephone.

## 2023-11-16 NOTE — PROGRESS NOTE ADULT - PROBLEM SELECTOR PLAN 3
-Hold loperamide  -GI pcr, stool cultures, stool O/P-> Vibrio Cholera tx with Azithromycin  - ID - monitor off abx

## 2023-11-16 NOTE — CONSULT NOTE ADULT - CONVERSATION DETAILS
Referral to palliative care for complex decision making and symptom management in setting of advanced malignancy. Met with patient at bedside, introduced self and role of palliative care. Reviewed pt current medical status and disease trajectory.   Pt shared the last two weeks she has noticed a significant decline in her functional status. Pt does not wish to continue current chemotherapy treatment, but does want to discuss other possible options with Dr. Whitmore when she is discharged. Counseled patient regarding role of HCP and provided reassurance that patient will continue to make their own medical decisions and that a proxy would substitute only in the event that the patient was incapable of doing so. Patient amenable to completing HCP paperwork. Pt appointed Kitty Gunderson (mothers) [phone] as primary and Saurabh Vides (sister) [phone] as secondary. Advanced care planning extensively discussed. Reviewed the risks and benefits of resuscitative measures including cardiopulmonary resuscitation and mechanical ventilation at the end of life in the setting of advanced malignancy. Pt shared she would not wish to be intubated and placed on a ventilator, explained at length that in order to be DNI pt would also have to be DNR, pt was unaware and felt like she couldn't decide at time of encounter, pt to remain  full code. Encouraged patient to discuss advance directives with her health care proxies and her outpatient providers. Referred patient to outpatient palliative care provider Dr. Oliveros.

## 2023-11-17 NOTE — CHART NOTE - NSCHARTNOTESELECT_GEN_ALL_CORE
d/c appt/Event Note
Invasive Procedure Team Consult Note/Event Note
Invasive Procedure Team Consult/Event Note
d/c appt/Event Note
to walk 2 miles/day
Return to prior level of function

## 2023-11-17 NOTE — CHART NOTE - NSCHARTNOTEFT_GEN_A_CORE
53 year old F PMH of metastatic NET s/p resection presenting for 2 month history of FTT, LE swelling , and ascites, IPT consulted for therapeutic paracentesis. Patient had diagnostic para per ED team yesterday. Patient seen at bedside, complaining of FRANCISCO and mild abdominal discomfort. Did not demonstrate significant abdominal distension, tenderness, or tenseness. POCUS performed showing ~5cm fluid pocket. Patient offered therapeutic paracentesis. Ascitic fluid likely to reaccumulate, patient informed of this. Patient stated preference to minimize number of procedures performed during hospital stay. Per discussion with patient, patient would prefer a therapeutic para performed closer to discharge.    Recommendations:  - monitor patient's breathing status and watch for tense/tender abdomen  - c/w diuresis  - can reconsult IPT for therapeutic para as patient approaches discharge, please consult sooner if patient develops worsening symptoms due to the ascites
IPT consulted for diagnostic paracentesis. Patient had diagnostic para 1 week ago that was negative for SBP. Patient had therapeutic para performed by IPT two days ago with 2.5L removed. Patient had persistent leak from previous diagnostic para site for several days. Patient is pending discharge but has had rising WBC, primary team expressed concern for SBP. Patient reassessed, found to be laying comfortable with soft, nontense, nontender abd. She had small ascites pockets that could be tapped for diag para. However, given 2 recent paracentesis, with diag negative for SBP and patient's lack of suggestive symptoms or fever, unlikely SBP developed in the interim. Risks of additional procedure outweigh potential benefit at this time.     Recommendations:  - If concerned for SBP, may treat empirically with antibiotics  - C/w leukocytosis w/u  - Monitor patient for worsening abdominal pain, tense abdomen, fever, difficulty breathing. If greater concern for SBP or symptomatic ascites, please reconsult IPT
Patient requested call back on 11/20.
Left a message for patient in regards to follow up care with callback information.

## 2023-11-25 PROBLEM — R18.8 ASCITES: Status: ACTIVE | Noted: 2023-01-01

## 2023-11-25 PROBLEM — C78.7 METASTATIC CANCER TO LIVER: Status: ACTIVE | Noted: 2020-04-23

## 2024-02-20 NOTE — ED PROVIDER NOTE - ABDOMINAL EXAM
Cardiovascular Clinic Note  [facility] [place] [id number] [address]  [address]  [facility] [address]  Dept Phone: [phone]      Vic Mckeon  : 1950  PCP: Lucía Rivers DO  Reason for Office Visit:   Chief Complaint   Patient presents with    Follow-up     No cardiac complaints today.       Assessment & Plan:  1. CAD in native artery    2. Elevated blood pressure reading in office with white coat syndrome, with diagnosis of hypertension    3. Pure hypercholesterolemia    4. Essential hypertension    5. Carotid artery stenosis, asymptomatic, bilateral    6. Precordial pain    7. History of sleep apnea        Orders Placed During This Encounter:  Orders Placed This Encounter    CBC with Automated Differential    Comprehensive Metabolic Panel    Lipid Panel With Reflex        23-year-old gentleman who I saw 2 weeks ago with episodes of precordial chest pain.  Was seen in the emergency room EKG unremarkable troponin normal x 2.    Proceed with exercise nuclear perfusion imaging stress test.  Had low exercise capacity secondary to spinal stenosis but that is heart rate to 100% of age-predicted maximal heart rate.  Normal EKG response normal perfusion and normal ejection fraction at 59%.    Currently just has costochondritis but no anginal symptoms    Labs HDL 67 triglycerides 116 LDL elevated 157 and patient is against taking statins.    Recommendations: Continue on current medical therapy time with diet exercise weight reduction and high-fiber diet.    Will see him back in 6 months for labs    The patient's previous laboratory and cardiac diagnostic testing listed below has been reviewed and was utilized to establish my current plan of care.  Previous outside notes were reviewed and taken into consideration when deciding further treatment.    I personally reviewed: Personally reviewed stress test and labs    Discussed with: Patient discussed the need  for high-fiber diet we did talk about statins he does not want them again.    Return to Clinic: Return in 6 months (on 8/20/2024). Patient instructed to have all ordered testing prior to follow-up visit.     History of Present Illness:  Dear Lucía Ortiz DO, We had the pleasure of seeing Vic Mckeon in the [facility]. Thank you for allowing me to see this patient in the office. As you know, this is a 73 year old male who presents with the chief complaint of Follow-up (No cardiac complaints today.)  -Without chest pain shortness of breath    Review of Systems:  A medically appropriate Review of Systems was performed for this visit and is negative except for HPI.     Physical Exam:  Visit Vitals  BP (!) 158/66   Pulse (!) 60   Resp 16   Ht 6' 1\" (1.854 m)   Wt 89 kg (196 lb 3.4 oz)   BMI 25.89 kg/m²     Wt Readings from Last 4 Encounters:   02/20/24 89 kg (196 lb 3.4 oz)   02/02/24 89.8 kg (198 lb)   01/18/24 90.1 kg (198 lb 8.4 oz)   12/29/23 90.8 kg (200 lb 3.2 oz)     Vital signs and previous weights were reviewed during today's visit.  Heart rate blood pressure reasonable slightly elevated blood pressure secondary to discussing his brother who has an EF of 10% and has in need of amputations  Gen: no acute distress, AOx3  Neck: Supple, no JVP, no carotid bruit  CV: RRR, S1, S2, No G/R/M  Chest: Lungs BCTA, non-labored respirations   Ext: warm, well perfused, no LE edema    ALLERGIES:   Allergen Reactions    Atorvastatin Calcium Other (See Comments)     myalgias    Gluten Meal   (Food Or Med) Other (See Comments)    Monascus Purpureus Went Yeast Other (See Comments)     Other Reaction(s): Hypertension      Category: Adverse Reaction; Annotation - [id number]: Pt states when he took RYR - he went to the ER given his BP became elevated    Penicillins RASH and Other (See Comments)     Cerner Allergy Text Annotation: penicillins, Cerner Reaction: rash      Statins Other (See Comments)      Category: Adverse Reaction; Annotation - [id number]: Pt has tried all statin therapies - states can not tolerate due to myalgias       Current Medications    AMLODIPINE (NORVASC) 5 MG TABLET    TAKE 1 TABLET BY MOUTH EVERY DAY    ASCORBIC ACID (VITAMIN C PO)    Take 1 tablet by mouth daily.    CHOLECALCIFEROL (VITAMIN D3) 5000 UNITS TAB    1 tablet daily    COENZYME Q10 100 MG CAPSULE    1 capsule daily    LOSARTAN-HYDROCHLOROTHIAZIDE (HYZAAR) 100-12.5 MG PER TABLET    Take 1 tablet by mouth daily.    MAGNESIUM OXIDE (MAG-OX) 400 MG TABLET    Take 400 mg by mouth. Pt states he is only taking 200mg now    MULTIPLE VITAMIN TABLET    Take 1 tablet by mouth.    SACCHAROMYCES BOULARDII (PROBIOTIC) 250 MG CAP    Take 250 mg by mouth.    TADALAFIL (CIALIS) 5 MG TABLET    Take 5 mg by mouth.       Vic's Allergies and Medications were reviewed with the patient and updated during today's visit. In addition, I discussed medication dosage, usage, goals of therapy, and side effects with him.    Labs:  Recent Labs     12/29/23  1326 01/13/24  1740 01/16/24  1115   CHOLESTEROL  --   --  243*   CALCLDL  --   --  157*   HDL  --   --  67   TRIGLYCERIDE  --   --  116   AST 27 38* 23   SODIUM 140 141 142   CHLORIDE 106 106 103   BUN 11 15 13   POTASSIUM 3.9 3.6 3.5   GLUCOSE 102* 96 91   CREATININE 0.80 0.81 0.94   GFRNA  --   --  85   CALCIUM 9.5 9.1 10.3   TSH  --   --  2.72   HGBA1C  --   --  5.6     Recent Labs     12/29/23  1326 01/13/24  1740 01/16/24  1115   WBC 4.3 6.1 4.5   RBC 4.21* 4.08* 4.33   HGB 12.7* 12.3* 12.7*   HCT 38.3* 37.1* 39.7   MCV 91.0 90.9 91.7   MCHC 33.2 33.2 32.0   RDWCV 13.4 13.2 13.1    261 264   TLYMPH 39 37 48         Thank you for allowing me to see this patient in our office. Please call our office with any questions. Correspondence will be sent to your office.    Christophe Hoang M.D., F.A.C.C.  [facility]  [facility] [place]     Cardiovascular Clinic  Note  [facility] [address]  [address]  [facility] [address]  Dept Phone: [phone]      Vic Mckeon  : 1950  PCP: Lucía Rivers DO  Reason for Office Visit:   Chief Complaint   Patient presents with    Follow-up     No cardiac complaints today.       Assessment & Plan:  1. CAD in native artery    2. Elevated blood pressure reading in office with white coat syndrome, with diagnosis of hypertension    3. Pure hypercholesterolemia    4. Essential hypertension    5. Carotid artery stenosis, asymptomatic, bilateral    6. Precordial pain    7. History of sleep apnea        Orders Placed During This Encounter:  Orders Placed This Encounter    CBC with Automated Differential    Comprehensive Metabolic Panel    Lipid Panel With Reflex            The patient's previous laboratory and cardiac diagnostic testing listed below has been reviewed and was utilized to establish my current plan of care.  Previous outside notes were reviewed and taken into consideration when deciding further treatment.    I personally reviewed:     Discussed with: Patient     Return to Clinic: Return in 6 months (on 2024). Patient instructed to have all ordered testing prior to follow-up visit.     History of Present Illness:  Dear Lucía Ortiz DO, We had the pleasure of seeing Vic Mckeon in the [facility] [place]. Thank you for allowing me to see this patient in the office. As you know, this is a 73 year old male who presents with the chief complaint of Follow-up (No cardiac complaints today.)  -    Review of Systems:  A medically appropriate Review of Systems was performed for this visit and is negative except for HPI.     Physical Exam:  Visit Vitals  BP (!) 158/66   Pulse (!) 60   Resp 16   Ht 6' 1\" (1.854 m)   Wt 89 kg (196 lb 3.4 oz)   BMI 25.89 kg/m²     Wt Readings from Last 4 Encounters:   24 89 kg (196 lb 3.4 oz)   24  89.8 kg (198 lb)   01/18/24 90.1 kg (198 lb 8.4 oz)   12/29/23 90.8 kg (200 lb 3.2 oz)     Vital signs and previous weights were reviewed during today's visit.  Gen: no acute distress, AOx3  Neck: Supple, no JVP, no carotid bruit  CV: RRR, S1, S2, No G/R/M  Chest: Lungs BCTA, non-labored respirations   Ext: warm, well perfused, no LE edema    ALLERGIES:   Allergen Reactions    Atorvastatin Calcium Other (See Comments)     myalgias    Gluten Meal   (Food Or Med) Other (See Comments)    Monascus Purpureus Went Yeast Other (See Comments)     Other Reaction(s): Hypertension      Category: Adverse Reaction; Annotation - [id number]: Pt states when he took RYR - he went to the ER given his BP became elevated    Penicillins RASH and Other (See Comments)     Cerner Allergy Text Annotation: penicillins, Cerner Reaction: rash      Statins Other (See Comments)     Category: Adverse Reaction; Annotation - [id number]: Pt has tried all statin therapies - states can not tolerate due to myalgias       Current Medications    AMLODIPINE (NORVASC) 5 MG TABLET    TAKE 1 TABLET BY MOUTH EVERY DAY    ASCORBIC ACID (VITAMIN C PO)    Take 1 tablet by mouth daily.    CHOLECALCIFEROL (VITAMIN D3) 5000 UNITS TAB    1 tablet daily    COENZYME Q10 100 MG CAPSULE    1 capsule daily    LOSARTAN-HYDROCHLOROTHIAZIDE (HYZAAR) 100-12.5 MG PER TABLET    Take 1 tablet by mouth daily.    MAGNESIUM OXIDE (MAG-OX) 400 MG TABLET    Take 400 mg by mouth. Pt states he is only taking 200mg now    MULTIPLE VITAMIN TABLET    Take 1 tablet by mouth.    SACCHAROMYCES BOULARDII (PROBIOTIC) 250 MG CAP    Take 250 mg by mouth.    TADALAFIL (CIALIS) 5 MG TABLET    Take 5 mg by mouth.       Vic's Allergies and Medications were reviewed with the patient and updated during today's visit. In addition, I discussed medication dosage, usage, goals of therapy, and side effects with him.    Labs:  Recent Labs     12/29/23  1326 01/13/24  1740 01/16/24  1115   CHOLESTEROL  --   soft/tender...  --  243*   CALCLDL  --   --  157*   HDL  --   --  67   TRIGLYCERIDE  --   --  116   AST 27 38* 23   SODIUM 140 141 142   CHLORIDE 106 106 103   BUN 11 15 13   POTASSIUM 3.9 3.6 3.5   GLUCOSE 102* 96 91   CREATININE 0.80 0.81 0.94   GFRNA  --   --  85   CALCIUM 9.5 9.1 10.3   TSH  --   --  2.72   HGBA1C  --   --  5.6     Recent Labs     12/29/23  1326 01/13/24  1740 01/16/24  1115   WBC 4.3 6.1 4.5   RBC 4.21* 4.08* 4.33   HGB 12.7* 12.3* 12.7*   HCT 38.3* 37.1* 39.7   MCV 91.0 90.9 91.7   MCHC 33.2 33.2 32.0   RDWCV 13.4 13.2 13.1    261 264   TLYMPH 39 37 48         Thank you for allowing me to see this patient in our office. Please call our office with any questions. Correspondence will be sent to your office.    Christophe Hoang M.D., F.A.C.C.  [facility]  [facility] [place]

## 2024-05-31 NOTE — DISCHARGE NOTE ADULT - NS AS DC DISCHARGE ACCOMPANY
Called pt in response to portal message regarding weak spells, BP and Metoprolol dosage. Pt reports that she had another weak spell this AM. Instructed her to take her BP sitting and standing a couple times today and keep a log. Instructed her to reach out to cardiologist for advisement regarding metoprolol and lisinopril dosages and be prepared to give him BP readings. Encouraged her to get 64 oz PO fluid and 60 GM protein daily. Instructed her to go to ER if weak spells continue or worsen. Pt has appointment scheduled on 6/5/2024.   
Family

## 2024-11-07 NOTE — REVIEW OF SYSTEMS
11/7   CONTINUED STAY REVIEW    Payor: [facility]  Subscriber #:  [id number]  Authorization Number: [id number]    Admit date: 10/29/24  Admit time:  5:21 PM      MEDICATIONS ADMINISTERED IN LAST 1 DAY:  adult 3 in 1 TPN       Date Action Dose Route User    11/6/2024 2057 New Bag (none) Intravenous MelanieMary Anne Miranda RN          cefTRIAXone (Rocephin) 2 g in sodium chloride 0.9% 100 mL IVPB-ADDV       Date Action Dose Route User    11/6/2024 1700 New Bag 2 g Intravenous Dorys Schofield RN          fluconazole (Diflucan) tab 150 mg       Date Action Dose Route User    11/6/2024 2240 Given 150 mg Oral MelanieMary Anne Miranda RN          levothyroxine (Synthroid) 100 MCG/5ML injection 65 mcg       Date Action Dose Route User    11/7/2024 0913 Given 65 mcg Intravenous Sharon Arthur RN          metRONIDAZOLE in sodium chloride 0.79% (Flagyl) 5 mg/mL IVPB premix 500 mg       Date Action Dose Route User    11/7/2024 0508 New Bag 500 mg Intravenous MelanieMary Anne Miranda RN    11/6/2024 1735 New Bag 500 mg Intravenous Dorys Schofield RN          nicotine (Nicoderm CQ) 21 MG/24HR patch 1 patch       Date Action Dose Route User    11/7/2024 0913 Patch Applied 1 patch Transdermal (Right Upper Arm) Sharon Arthur RN          pantoprazole (Protonix) 40 mg in sodium chloride 0.9% PF 10 mL IV push       Date Action Dose Route User    11/7/2024 0508 Given 40 mg Intravenous MelanieMary Anne Miranda RN    11/6/2024 1700 Given 40 mg Intravenous Dorys Schofield RN          zolpidem (Ambien) tab 5 mg       Date Action Dose Route User    11/6/2024 2103 Given 5 mg Oral MelanieMary Anne Miranda RN            Vitals (last day)       Date/Time Temp Pulse Resp BP SpO2 Weight O2 Device O2 Flow Rate (L/min) [facility]    11/07/24 1355 97.6 °F (36.4 °C) 80 16 121/55 98 % -- None (Room air) -- EP    11/07/24 0512 97.7 °F (36.5 °C) 91 16 121/51 99 % -- None (Room air) -- SD    11/06/24 2000 98.6 °F (37 °C) 80 18 136/63 99  % -- None (Room air) -- AA    11/06/24 1225 97.9 °F (36.6 °C) 108 18 133/85 99 % -- None (Room air) -- RH    11/06/24 0540 97.9 °F (36.6 °C) 88 16 101/67 98 % -- None (Room air) -- IG       11/7 GEN SURGERY NOTE  Hosp Day # 9 PCP Cristel Arias MD      Subjective:  Feels ok     Objective/Physical Exam:  General: Alert, orientated x3.  Cooperative.  No apparent distress.  Vital Signs:  Blood pressure 121/51, pulse 91, temperature 97.7 °F (36.5 °C), temperature source Oral, resp. rate 16, height 5' 1.42\" (1.56 m), weight 132 lb 9.6 oz (60.1 kg), SpO2 99%, not currently breastfeeding.  Abdomen:  grossly neg   Labs:        Lab Results   Component Value Date     WBC 7.2 11/07/2024     HGB 12.7 11/07/2024     HCT 38.5 11/07/2024     .0 11/07/2024     CREATSERUM 0.51 (L) 11/07/2024     BUN 18 11/07/2024      11/07/2024     K 4.0 11/07/2024      11/07/2024     CO2 25.0 11/07/2024      (H) 11/07/2024     CA 8.7 11/07/2024     ALB 3.4 11/07/2024     ALKPHO 48 (L) 11/07/2024     BILT 0.2 11/07/2024     TP 5.7 11/07/2024     AST 16 11/07/2024     ALT 28 11/07/2024          Assessment/Plan:      Patient Active Problem List   Diagnosis    Osteoporosis    Contusion of right chest wall    Perforated diverticulum of duodenum    Duodenal ulcer with perforation (HCC)    Perforated duodenal ulcer (HCC)    Small bowel diverticular disease   CT  still possible leak    Long discussion with Dr. Aranda (radiology)  Difficult to tell if on-going leak or starting to heal  He suggested CT tomorrow with no oral contrast  If continued leak then will need surgical repair  Discussed with pt and daughter    11/7 HOSPITALIST NOTE    Subjective:  Patient resting comfortably in chair  No acute distress  No cp, sob, f,c,n,v abd pain or HA         Objective:  Review of Systems:   ROS completed; pertinent positive and negatives stated in subjective.        Vital signs:  Temp:  [97.6 °F (36.4 °C)-98.6 °F (37 °C)] 97.6 °F (36.4  °C)  Pulse:  [80-91] 80  Resp:  [16-18] 16  BP: (121-136)/(51-63) 121/55  SpO2:  [98 %-99 %] 98 %        Physical Exam:    Gen: NAD AO x3  Chest: good air entry CTABL  CVS: normal s1 and s2 RR  Abd: NABS soft NT ND  Neuro: CN 2-12 grossly intact  Ext: no edema in bilateral LE        Diagnostic Data:    Labs:          Recent Labs   Lab 11/03/24  0436 11/04/24  0553 11/05/24  0523 11/06/24  0545 11/07/24  0459   WBC 6.6 8.2 6.8 7.5 7.2   HGB 13.4 13.4 13.5 14.0 12.7   MCV 88.9 90.6 91.4 90.6 90.8   .0 317.0 331.0 372.0 324.0               Recent Labs   Lab 11/05/24  0523 11/06/24  0546 11/07/24  0459   GLU 99  99 93  93 117*   BUN 12  12 16  16 18   CREATSERUM 0.54*  0.54* 0.55  0.55 0.51*   CA 9.2  9.2 9.1  9.1 8.7   ALB 3.7 3.8 3.4     143 140  140 142   K 4.4  4.4 4.2  4.2 4.0     111 109  109 110   CO2 26.0  26.0 25.0  25.0 25.0   ALKPHO 48* 53* 48*   AST 55* 31 16   ALT 51* 43 28   BILT 0.2 0.2 0.2   TP 5.9 6.3 5.7        Assessment & Plan:  Acute duodenal perforation  ?PUD  Imaging reviewed  Paraduodenal fluid collection and possible abscess  Contrast study showing extraluminal extravastation of the contrast  GI on consult  Underwent EGD, findings reviewed  UGI with gastrograffin also complete, still showing extravasation  Continue PPI BID and IV abx  CT scan without oral contrast in AM, if still with leak she will need surgical repair   Gsx on consult  Continue IVF and IV abx  Continue TPN  Repeat contrast study as above  NPO at this time, TPN via PICC line started 11/01/2024  Continue IV Ceftriaxone and Flagyl  PRN pain control  Hypothyroidism  HLD  Resume home meds once tolerating PO intake           [Fatigue] : fatigue [Hot Flashes] : hot flashes [Fever] : no fever [Chills] : no chills [Dysphagia] : no dysphagia [Odynophagia] : no odynophagia [Chest Pain] : no chest pain [Palpitations] : no palpitations [Lower Ext Edema] : no lower extremity edema [Shortness Of Breath] : no shortness of breath [Cough] : no cough [Abdominal Pain] : no abdominal pain [Constipation] : no constipation [Diarrhea] : no diarrhea [Dysuria] : no dysuria [Joint Pain] : no joint pain [Muscle Pain] : no muscle pain [Skin Rash] : no skin rash [Dizziness] : no dizziness [Difficulty Walking] : no difficulty walking [Easy Bleeding] : no tendency for easy bleeding [Easy Bruising] : no tendency for easy bruising

## 2025-02-16 NOTE — H&P ADULT - NSHPREVIEWOFSYSTEMS_GEN_ALL_CORE
see MD note CONSTITUTIONAL:  No weight loss, fever, chills, weakness or fatigue.  HEENT:  Eyes:  No visual loss, blurred vision, double vision or yellow sclerae. Ears, Nose, Throat:  No hearing loss, sneezing, congestion, runny nose or sore throat.  SKIN:  No rash or itching.  CARDIOVASCULAR:  No chest pain, chest pressure or chest discomfort. No palpitations.  RESPIRATORY:  No shortness of breath, cough or sputum.  GASTROINTESTINAL:  No anorexia, nausea, vomiting or diarrhea. No abdominal pain or blood.  GENITOURINARY:  Denies hematuria, dysuria.   NEUROLOGICAL:  No headache, dizziness, syncope, paralysis, ataxia, numbness or tingling in the extremities. No change in bowel or bladder control.  MUSCULOSKELETAL:  No muscle, back pain, joint pain or stiffness.  HEMATOLOGIC:  No anemia, bleeding or bruising.  LYMPHATICS:  No enlarged nodes.   PSYCHIATRIC:  No history of depression or anxiety.  ENDOCRINOLOGIC:  No reports of sweating, cold or heat intolerance. No polyuria or polydipsia.  ALLERGIES:  No history of asthma, hives, eczema or rhinitis. CONSTITUTIONAL: +weakness or fatigue.  HEENT:  Eyes:  No visual loss, blurred vision, double vision or yellow sclerae. Ears, Nose, Throat:  No hearing loss, sneezing, congestion, runny nose or sore throat.  SKIN:  No rash or itching.  CARDIOVASCULAR:  No chest pain, chest pressure or chest discomfort. No palpitations.  RESPIRATORY:  No shortness of breath, cough or sputum.  GASTROINTESTINAL:  +melena  GENITOURINARY:  Denies hematuria, dysuria.   NEUROLOGICAL:  No headache, dizziness, syncope, paralysis, ataxia, numbness or tingling in the extremities. No change in bowel or bladder control.  MUSCULOSKELETAL:  No muscle, back pain, joint pain or stiffness.  LYMPHATICS:  No enlarged nodes.   PSYCHIATRIC:  No history of depression or anxiety.  ENDOCRINOLOGIC:  No reports of sweating, cold or heat intolerance. No polyuria or polydipsia.  ALLERGIES:  No history of asthma, hives, eczema or rhinitis. CONSTITUTIONAL: +weakness/fatigue/malaise, no fevers/chills/diaphoresis  HEENT:  Eyes:  No visual loss, blurred vision, double vision or yellow sclerae. Ears, Nose, Throat:  No hearing loss, sneezing, congestion, runny nose or sore throat.  SKIN:  No rash or itching.  CARDIOVASCULAR:  No chest pain, chest pressure or chest discomfort. No palpitations.  RESPIRATORY:  No shortness of breath, cough or sputum.  GASTROINTESTINAL:  +melena, +RUQ abd pain, intermittent, no nausea/emesis/hematemesis, no current diarrhea/melena/hematochezia  GENITOURINARY:  Denies hematuria, dysuria.   NEUROLOGICAL:  No headache, dizziness, syncope, paralysis, ataxia, numbness or tingling in the extremities. No change in bowel or bladder control.  MUSCULOSKELETAL:  No muscle, back pain, joint pain or stiffness.  ENDOCRINOLOGIC:  No reports of sweating, cold or heat intolerance. No polyuria or polydipsia.  ALLERGIES:  No history of asthma, hives, eczema or rhinitis.
